# Patient Record
Sex: FEMALE | Race: WHITE | NOT HISPANIC OR LATINO | Employment: UNEMPLOYED | URBAN - METROPOLITAN AREA
[De-identification: names, ages, dates, MRNs, and addresses within clinical notes are randomized per-mention and may not be internally consistent; named-entity substitution may affect disease eponyms.]

---

## 2017-06-10 ENCOUNTER — HOSPITAL ENCOUNTER (OUTPATIENT)
Dept: RESPIRATORY THERAPY | Facility: HOSPITAL | Age: 57
Discharge: HOME | End: 2017-06-10
Attending: NURSE PRACTITIONER | Admitting: NURSE PRACTITIONER

## 2017-08-15 ENCOUNTER — HOSPITAL ENCOUNTER (OUTPATIENT)
Dept: SLEEP MEDICINE | Facility: HOSPITAL | Age: 57
Discharge: HOME | End: 2017-08-15
Attending: INTERNAL MEDICINE | Admitting: INTERNAL MEDICINE

## 2017-08-15 ENCOUNTER — HOSPITAL ENCOUNTER (OUTPATIENT)
Dept: MAMMOGRAPHY | Facility: HOSPITAL | Age: 57
Discharge: HOME | End: 2017-08-15

## 2017-08-16 ENCOUNTER — HOSPITAL ENCOUNTER (OUTPATIENT)
Dept: PREOP | Facility: HOSPITAL | Age: 57
Setting detail: HOSPITAL OUTPATIENT SURGERY
Discharge: HOME | End: 2017-08-16
Attending: INTERNAL MEDICINE | Admitting: INTERNAL MEDICINE

## 2017-08-16 LAB
AFB STAIN: NORMAL
B PERT DNA SPEC QL NAA+PROBE: NOT DETECTED
BACTERIA SPEC AEROBE CULT: NORMAL
C PNEUM DNA NPH QL NAA+NON-PROBE: NOT DETECTED
CONCENTRATED SMEAR: (no result)
CONV DIRECT SMEAR: NORMAL
CONV GRANULOCYTES, FLUID: 42 %
CONV MONOCYTES, FLUID: 6 %
CONV RESPNL SPECIMEN: NORMAL
FLUAV H1 2009 PAND RNA NPH QL NAA+PROBE: NOT DETECTED
FLUAV H1 HA GENE NPH QL NAA+PROBE: NOT DETECTED
FLUAV H3 RNA NPH QL NAA+PROBE: NOT DETECTED
FLUAV SUBTYP SPEC NAA+PROBE: NOT DETECTED
FLUBV RNA ISLT QL NAA+PROBE: NOT DETECTED
GRAM STN SPEC: NORMAL
HADV DNA SPEC NAA+PROBE: NOT DETECTED
HCOV 229E RNA SPEC QL NAA+PROBE: NOT DETECTED
HCOV HKU1 RNA SPEC QL NAA+PROBE: NOT DETECTED
HCOV NL63 RNA SPEC QL NAA+PROBE: NOT DETECTED
HCOV OC43 RNA SPEC QL NAA+PROBE: NOT DETECTED
HMPV RNA NPH QL NAA+NON-PROBE: NOT DETECTED
HPIV1 RNA ISLT QL NAA+PROBE: NOT DETECTED
HPIV2 RNA SPEC QL NAA+PROBE: NOT DETECTED
HPIV3 RNA NPH QL NAA+PROBE: NOT DETECTED
HPIV4 P GENE NPH QL NAA+PROBE: NOT DETECTED
Lab: NORMAL
M PNEUMO IGG SER IA-ACNC: NOT DETECTED
MESOTHL CELL NFR FLD MANUAL: 52 %
MICRO REPORT STATUS: NORMAL
RHINOVIRUS RNA SPEC NAA+PROBE: NOT DETECTED
RSV RNA NPH QL NAA+NON-PROBE: NOT DETECTED
SPECIMEN SOURCE: (no result)
SPECIMEN SOURCE: NORMAL
WBC # FLD MANUAL: (no result) {CELLS}/UL

## 2017-08-18 LAB
SPECIMEN SOURCE: NORMAL
SPECIMEN SOURCE: NORMAL

## 2017-08-23 ENCOUNTER — HOSPITAL ENCOUNTER (OUTPATIENT)
Dept: CT IMAGING | Facility: HOSPITAL | Age: 57
Discharge: HOME | End: 2017-08-23
Attending: INTERNAL MEDICINE | Admitting: INTERNAL MEDICINE

## 2017-09-12 ENCOUNTER — HOSPITAL ENCOUNTER (OUTPATIENT)
Dept: SLEEP MEDICINE | Facility: HOSPITAL | Age: 57
Discharge: HOME | End: 2017-09-12
Attending: INTERNAL MEDICINE | Admitting: INTERNAL MEDICINE

## 2017-09-29 ENCOUNTER — HOSPITAL ENCOUNTER (OUTPATIENT)
Dept: FAMILY MEDICINE CLINIC | Facility: CLINIC | Age: 57
Setting detail: SPECIMEN
Discharge: HOME | End: 2017-09-29
Attending: HOSPITALIST | Admitting: HOSPITALIST

## 2018-02-14 ENCOUNTER — HOSPITAL ENCOUNTER (OUTPATIENT)
Dept: ORTHOPEDIC SURGERY | Facility: CLINIC | Age: 58
Discharge: HOME | End: 2018-02-14
Attending: ORTHOPAEDIC SURGERY | Admitting: ORTHOPAEDIC SURGERY

## 2018-03-02 ENCOUNTER — HOSPITAL ENCOUNTER (OUTPATIENT)
Dept: PREOP | Facility: HOSPITAL | Age: 58
Setting detail: HOSPITAL OUTPATIENT SURGERY
Discharge: HOME | End: 2018-03-02
Attending: INTERNAL MEDICINE | Admitting: INTERNAL MEDICINE

## 2018-04-09 ENCOUNTER — HOSPITAL ENCOUNTER (OUTPATIENT)
Dept: RHEUMATOLOGY | Facility: CLINIC | Age: 58
Discharge: HOME | End: 2018-04-09
Attending: NURSE PRACTITIONER | Admitting: NURSE PRACTITIONER

## 2018-05-21 ENCOUNTER — OFFICE (AMBULATORY)
Dept: URBAN - METROPOLITAN AREA PATHOLOGY 4 | Facility: PATHOLOGY | Age: 58
End: 2018-05-21
Payer: COMMERCIAL

## 2018-05-21 ENCOUNTER — ON CAMPUS - OUTPATIENT (AMBULATORY)
Dept: URBAN - METROPOLITAN AREA HOSPITAL 2 | Facility: HOSPITAL | Age: 58
End: 2018-05-21
Payer: COMMERCIAL

## 2018-05-21 VITALS
SYSTOLIC BLOOD PRESSURE: 177 MMHG | SYSTOLIC BLOOD PRESSURE: 139 MMHG | DIASTOLIC BLOOD PRESSURE: 68 MMHG | OXYGEN SATURATION: 96 % | OXYGEN SATURATION: 97 % | HEIGHT: 62 IN | RESPIRATION RATE: 16 BRPM | DIASTOLIC BLOOD PRESSURE: 61 MMHG | HEART RATE: 93 BPM | DIASTOLIC BLOOD PRESSURE: 74 MMHG | DIASTOLIC BLOOD PRESSURE: 66 MMHG | HEART RATE: 94 BPM | HEART RATE: 84 BPM | WEIGHT: 214 LBS | HEART RATE: 95 BPM | SYSTOLIC BLOOD PRESSURE: 138 MMHG | DIASTOLIC BLOOD PRESSURE: 77 MMHG | OXYGEN SATURATION: 98 % | RESPIRATION RATE: 18 BRPM | TEMPERATURE: 98 F | SYSTOLIC BLOOD PRESSURE: 141 MMHG | SYSTOLIC BLOOD PRESSURE: 132 MMHG | OXYGEN SATURATION: 93 %

## 2018-05-21 DIAGNOSIS — K31.7 POLYP OF STOMACH AND DUODENUM: ICD-10-CM

## 2018-05-21 DIAGNOSIS — K21.0 GASTRO-ESOPHAGEAL REFLUX DISEASE WITH ESOPHAGITIS: ICD-10-CM

## 2018-05-21 DIAGNOSIS — K21.9 GASTRO-ESOPHAGEAL REFLUX DISEASE WITHOUT ESOPHAGITIS: ICD-10-CM

## 2018-05-21 DIAGNOSIS — R13.10 DYSPHAGIA, UNSPECIFIED: ICD-10-CM

## 2018-05-21 DIAGNOSIS — K44.9 DIAPHRAGMATIC HERNIA WITHOUT OBSTRUCTION OR GANGRENE: ICD-10-CM

## 2018-05-21 DIAGNOSIS — R05 COUGH: ICD-10-CM

## 2018-05-21 LAB
GI HISTOLOGY: A. UNSPECIFIED: (no result)
GI HISTOLOGY: PDF REPORT: (no result)

## 2018-05-21 PROCEDURE — 88305 TISSUE EXAM BY PATHOLOGIST: CPT | Performed by: INTERNAL MEDICINE

## 2018-05-21 PROCEDURE — 43239 EGD BIOPSY SINGLE/MULTIPLE: CPT | Performed by: INTERNAL MEDICINE

## 2018-05-21 PROCEDURE — 43450 DILATE ESOPHAGUS 1/MULT PASS: CPT | Performed by: INTERNAL MEDICINE

## 2018-05-21 RX ORDER — ESOMEPRAZOLE 20 MG/1
20 CAPSULE, DELAYED RELEASE ORAL
Status: COMPLETED
End: 2018-05-21

## 2018-05-21 RX ORDER — MELOXICAM 15 MG/1
TABLET ORAL
Refills: 0 | Status: COMPLETED
End: 2018-05-21

## 2018-05-21 RX ORDER — PANTOPRAZOLE 40 MG/1
TABLET, DELAYED RELEASE ORAL
Qty: 180 | Refills: 3 | Status: ACTIVE

## 2018-05-21 RX ADMIN — PROPOFOL: 10 INJECTION, EMULSION INTRAVENOUS at 12:51

## 2018-06-11 ENCOUNTER — HOSPITAL ENCOUNTER (OUTPATIENT)
Dept: ORTHOPEDIC SURGERY | Facility: CLINIC | Age: 58
Discharge: HOME | End: 2018-06-11
Attending: ORTHOPAEDIC SURGERY | Admitting: ORTHOPAEDIC SURGERY

## 2018-08-03 ENCOUNTER — HOSPITAL ENCOUNTER (OUTPATIENT)
Dept: PREOP | Facility: HOSPITAL | Age: 58
Setting detail: HOSPITAL OUTPATIENT SURGERY
Discharge: HOME | End: 2018-08-03
Attending: INTERNAL MEDICINE | Admitting: INTERNAL MEDICINE

## 2018-08-03 LAB
AFB STAIN: NORMAL
B PERT DNA SPEC QL NAA+PROBE: NOT DETECTED
BACTERIA SPEC AEROBE CULT: NORMAL
C PNEUM DNA NPH QL NAA+NON-PROBE: NOT DETECTED
CONCENTRATED SMEAR: NORMAL
CONV DIRECT SMEAR: NORMAL
CONV GRANULOCYTES, FLUID: 58 %
CONV MONOCYTES, FLUID: 10 %
CONV RESPNL SPECIMEN: NORMAL
EOSINOPHIL NFR FLD MANUAL: 9 %
FLUAV H1 2009 PAND RNA NPH QL NAA+PROBE: NOT DETECTED
FLUAV H1 HA GENE NPH QL NAA+PROBE: NOT DETECTED
FLUAV H3 RNA NPH QL NAA+PROBE: NOT DETECTED
FLUAV SUBTYP SPEC NAA+PROBE: NOT DETECTED
FLUBV RNA ISLT QL NAA+PROBE: NOT DETECTED
GRAM STN SPEC: NORMAL
HADV DNA SPEC NAA+PROBE: NOT DETECTED
HCOV 229E RNA SPEC QL NAA+PROBE: NOT DETECTED
HCOV HKU1 RNA SPEC QL NAA+PROBE: NOT DETECTED
HCOV NL63 RNA SPEC QL NAA+PROBE: NOT DETECTED
HCOV OC43 RNA SPEC QL NAA+PROBE: NOT DETECTED
HMPV RNA NPH QL NAA+NON-PROBE: NOT DETECTED
HPIV1 RNA ISLT QL NAA+PROBE: NOT DETECTED
HPIV2 RNA SPEC QL NAA+PROBE: NOT DETECTED
HPIV3 RNA NPH QL NAA+PROBE: NOT DETECTED
HPIV4 P GENE NPH QL NAA+PROBE: NOT DETECTED
LYMPHOCYTES NFR FLD MANUAL: 12 %
Lab: NORMAL
M PNEUMO IGG SER IA-ACNC: NOT DETECTED
MESOTHL CELL NFR FLD MANUAL: 11 %
MICRO REPORT STATUS: NORMAL
RHINOVIRUS RNA SPEC NAA+PROBE: NOT DETECTED
RSV RNA NPH QL NAA+NON-PROBE: NOT DETECTED
SPECIMEN SOURCE: NORMAL

## 2018-08-06 LAB — SPECIMEN SOURCE: NORMAL

## 2018-09-19 ENCOUNTER — HOSPITAL ENCOUNTER (OUTPATIENT)
Dept: RHEUMATOLOGY | Facility: CLINIC | Age: 58
Discharge: HOME | End: 2018-09-19
Attending: INTERNAL MEDICINE | Admitting: INTERNAL MEDICINE

## 2019-04-09 ENCOUNTER — HOSPITAL ENCOUNTER (OUTPATIENT)
Dept: LAB | Facility: HOSPITAL | Age: 59
Discharge: HOME | End: 2019-04-09
Attending: NURSE PRACTITIONER | Admitting: NURSE PRACTITIONER

## 2019-04-09 LAB
ALBUMIN SERPL-MCNC: 3.7 G/DL (ref 3.5–4.8)
ALBUMIN/GLOB SERPL: 1.1 {RATIO} (ref 1–1.7)
ALP SERPL-CCNC: 83 IU/L (ref 32–91)
ALT SERPL-CCNC: 11 IU/L (ref 14–54)
ANION GAP SERPL CALC-SCNC: 14.1 MMOL/L (ref 10–20)
AST SERPL-CCNC: 19 IU/L (ref 15–41)
BASOPHILS # BLD AUTO: 0.1 10*3/UL (ref 0–0.2)
BASOPHILS NFR BLD AUTO: 1 % (ref 0–2)
BILIRUB SERPL-MCNC: 0.6 MG/DL (ref 0.3–1.2)
BILIRUB UR QL STRIP: NEGATIVE MG/DL
BUN SERPL-MCNC: 12 MG/DL (ref 8–20)
BUN/CREAT SERPL: 17.1 (ref 5.4–26.2)
CALCIUM SERPL-MCNC: 9.2 MG/DL (ref 8.9–10.3)
CASTS URNS QL MICRO: NORMAL /[LPF]
CHLORIDE SERPL-SCNC: 103 MMOL/L (ref 101–111)
COLOR UR: YELLOW
CONV BACTERIA IN URINE MICRO: NEGATIVE
CONV CLARITY OF URINE: CLEAR
CONV CO2: 26 MMOL/L (ref 22–32)
CONV HYALINE CASTS IN URINE MICRO: 2 /[LPF] (ref 0–5)
CONV PROTEIN IN URINE BY AUTOMATED TEST STRIP: NEGATIVE MG/DL
CONV SMALL ROUND CELLS: NORMAL /[HPF]
CONV TOTAL PROTEIN: 7.2 G/DL (ref 6.1–7.9)
CONV UROBILINOGEN IN URINE BY AUTOMATED TEST STRIP: 0.2 MG/DL
CREAT UR-MCNC: 0.7 MG/DL (ref 0.4–1)
CRP SERPL-MCNC: 0.67 MG/DL (ref 0–0.7)
CULTURE INDICATED?: NORMAL
DIFFERENTIAL METHOD BLD: (no result)
EOSINOPHIL # BLD AUTO: 0.3 10*3/UL (ref 0–0.3)
EOSINOPHIL # BLD AUTO: 5 % (ref 0–3)
ERYTHROCYTE [DISTWIDTH] IN BLOOD BY AUTOMATED COUNT: 14.9 % (ref 11.5–14.5)
ERYTHROCYTE [SEDIMENTATION RATE] IN BLOOD BY WESTERGREN METHOD: 59 MM/HR (ref 0–30)
GLOBULIN UR ELPH-MCNC: 3.5 G/DL (ref 2.5–3.8)
GLUCOSE SERPL-MCNC: 94 MG/DL (ref 65–99)
GLUCOSE UR QL: NEGATIVE MG/DL
HCT VFR BLD AUTO: 37.1 % (ref 35–49)
HGB BLD-MCNC: 12.1 G/DL (ref 12–15)
HGB UR QL STRIP: NEGATIVE
KETONES UR QL STRIP: NEGATIVE MG/DL
LEUKOCYTE ESTERASE UR QL STRIP: NEGATIVE
LYMPHOCYTES # BLD AUTO: 1.6 10*3/UL (ref 0.8–4.8)
LYMPHOCYTES NFR BLD AUTO: 25 % (ref 18–42)
MCH RBC QN AUTO: 29.6 PG (ref 26–32)
MCHC RBC AUTO-ENTMCNC: 32.6 G/DL (ref 32–36)
MCV RBC AUTO: 90.7 FL (ref 80–94)
MONOCYTES # BLD AUTO: 0.8 10*3/UL (ref 0.1–1.3)
MONOCYTES NFR BLD AUTO: 13 % (ref 2–11)
NEUTROPHILS # BLD AUTO: 3.6 10*3/UL (ref 2.3–8.6)
NEUTROPHILS NFR BLD AUTO: 56 % (ref 50–75)
NITRITE UR QL STRIP: NEGATIVE
NRBC BLD AUTO-RTO: 0 /100{WBCS}
NRBC/RBC NFR BLD MANUAL: 0 10*3/UL
PH UR STRIP.AUTO: 6.5 [PH] (ref 4.5–8)
PLATELET # BLD AUTO: 239 10*3/UL (ref 150–450)
PMV BLD AUTO: 9.9 FL (ref 7.4–10.4)
POTASSIUM SERPL-SCNC: 4.1 MMOL/L (ref 3.6–5.1)
RBC # BLD AUTO: 4.09 10*6/UL (ref 4–5.4)
RBC #/AREA URNS HPF: 1 /[HPF] (ref 0–3)
SODIUM SERPL-SCNC: 139 MMOL/L (ref 136–144)
SP GR UR: 1.02 (ref 1–1.03)
SPERM URNS QL MICRO: NORMAL /[HPF]
SQUAMOUS SPT QL MICRO: 0 /[HPF] (ref 0–5)
UNIDENT CRYS URNS QL MICRO: NORMAL /[HPF]
WBC # BLD AUTO: 6.3 10*3/UL (ref 4.5–11.5)
WBC #/AREA URNS HPF: 0 /[HPF] (ref 0–5)
YEAST SPEC QL WET PREP: NORMAL /[HPF]

## 2019-04-10 LAB
C3 SERPL-MCNC: 99 MG/DL (ref 79–152)
C4 SERPL-MCNC: 25.3 MG/DL (ref 18–55)

## 2019-07-05 PROBLEM — Z82.3 FAMILY HISTORY OF CEREBROVASCULAR ACCIDENT (CVA): Status: ACTIVE | Noted: 2019-07-05

## 2019-07-05 PROBLEM — M19.011 OSTEOARTHRITIS OF RIGHT SHOULDER: Status: ACTIVE | Noted: 2017-03-03

## 2019-07-05 PROBLEM — M79.643 PAIN OF HAND: Status: ACTIVE | Noted: 2018-09-19

## 2019-07-05 PROBLEM — Z47.89 ORTHOPEDIC AFTERCARE: Status: ACTIVE | Noted: 2018-06-11

## 2019-07-05 PROBLEM — M54.16 LUMBAR RADICULITIS: Status: ACTIVE | Noted: 2017-05-10

## 2019-07-05 PROBLEM — J45.901 ASTHMA WITH ACUTE EXACERBATION: Status: ACTIVE | Noted: 2017-06-13

## 2019-07-05 PROBLEM — Z87.01 HISTORY OF PNEUMONIA: Status: ACTIVE | Noted: 2018-04-09

## 2019-07-05 PROBLEM — R11.0 NAUSEA: Status: ACTIVE | Noted: 2018-01-10

## 2019-07-05 PROBLEM — Z23 NEED FOR OTHER PROPHYLACTIC VACCINATION AND INOCULATION AGAINST SINGLE DISEASES: Status: ACTIVE | Noted: 2017-09-29

## 2019-07-05 PROBLEM — E66.9 OBESITY: Status: ACTIVE | Noted: 2019-07-05

## 2019-07-05 PROBLEM — M81.0 OSTEOPOROSIS: Status: ACTIVE | Noted: 2017-07-13

## 2019-07-05 PROBLEM — F41.1 GENERALIZED ANXIETY DISORDER: Status: ACTIVE | Noted: 2019-07-05

## 2019-07-05 PROBLEM — I10 HYPERTENSION: Status: ACTIVE | Noted: 2019-07-05

## 2019-07-05 PROBLEM — M25.511 PAIN IN RIGHT SHOULDER: Status: ACTIVE | Noted: 2017-02-13

## 2019-07-05 PROBLEM — E78.5 HYPERLIPIDEMIA: Status: ACTIVE | Noted: 2019-07-05

## 2019-07-05 PROBLEM — M25.569 KNEE PAIN: Status: ACTIVE | Noted: 2017-02-13

## 2019-07-05 PROBLEM — M06.9 RHEUMATOID ARTHRITIS (HCC): Status: ACTIVE | Noted: 2019-07-05

## 2019-07-05 RX ORDER — FLUTICASONE PROPIONATE 50 MCG
2 SPRAY, SUSPENSION (ML) NASAL DAILY
COMMUNITY
Start: 2019-04-18

## 2019-07-05 RX ORDER — MONTELUKAST SODIUM 10 MG/1
TABLET ORAL
COMMUNITY
Start: 2018-12-11 | End: 2021-09-11 | Stop reason: SDUPTHER

## 2019-07-05 RX ORDER — TRAMADOL HYDROCHLORIDE 50 MG/1
TABLET ORAL EVERY 6 HOURS
COMMUNITY
Start: 2018-02-14 | End: 2020-05-04

## 2019-07-05 RX ORDER — BUDESONIDE AND FORMOTEROL FUMARATE DIHYDRATE 160; 4.5 UG/1; UG/1
AEROSOL RESPIRATORY (INHALATION)
COMMUNITY
Start: 2018-09-17 | End: 2019-09-15 | Stop reason: SDUPTHER

## 2019-07-05 RX ORDER — PANTOPRAZOLE SODIUM 40 MG/1
40 TABLET, DELAYED RELEASE ORAL DAILY
COMMUNITY
Start: 2018-05-21

## 2019-07-05 RX ORDER — HYDROXYCHLOROQUINE SULFATE 200 MG/1
200 TABLET, FILM COATED ORAL EVERY 12 HOURS
Qty: 180 TABLET | Refills: 0 | Status: SHIPPED | OUTPATIENT
Start: 2019-07-05 | End: 2019-10-07 | Stop reason: SDUPTHER

## 2019-07-05 RX ORDER — PREDNISONE 1 MG/1
TABLET ORAL EVERY 24 HOURS
COMMUNITY
Start: 2018-09-19 | End: 2019-08-01 | Stop reason: SDUPTHER

## 2019-07-05 RX ORDER — CETIRIZINE HYDROCHLORIDE 10 MG/1
TABLET ORAL
Status: ON HOLD | COMMUNITY
Start: 2019-04-18 | End: 2022-01-25

## 2019-07-05 RX ORDER — ALBUTEROL SULFATE 2.5 MG/3ML
2.5 SOLUTION RESPIRATORY (INHALATION) EVERY 6 HOURS PRN
COMMUNITY
Start: 2017-07-18

## 2019-07-05 RX ORDER — THEOPHYLLINE 300 MG/1
400 TABLET, EXTENDED RELEASE ORAL EVERY 24 HOURS
Status: ON HOLD | COMMUNITY
Start: 2018-04-17 | End: 2022-01-24

## 2019-07-05 RX ORDER — VITAMIN B COMPLEX
1 CAPSULE ORAL DAILY
COMMUNITY
Start: 2017-04-20

## 2019-07-05 RX ORDER — ALBUTEROL SULFATE 90 UG/1
AEROSOL, METERED RESPIRATORY (INHALATION)
COMMUNITY
Start: 2019-04-18 | End: 2021-05-21 | Stop reason: SDUPTHER

## 2019-07-05 RX ORDER — FOLIC ACID 1 MG/1
TABLET ORAL
COMMUNITY
Start: 2016-08-03 | End: 2020-01-08 | Stop reason: SDUPTHER

## 2019-07-05 RX ORDER — HYDROXYCHLOROQUINE SULFATE 200 MG/1
200 TABLET, FILM COATED ORAL EVERY 12 HOURS
COMMUNITY
Start: 2017-07-13 | End: 2019-07-05 | Stop reason: SDUPTHER

## 2019-07-05 RX ORDER — CITALOPRAM 40 MG/1
TABLET ORAL EVERY 24 HOURS
COMMUNITY
Start: 2018-01-15 | End: 2019-09-04 | Stop reason: SDUPTHER

## 2019-07-05 RX ORDER — LOSARTAN POTASSIUM 100 MG/1
TABLET ORAL EVERY 24 HOURS
COMMUNITY
Start: 2018-07-31 | End: 2019-09-04 | Stop reason: SDUPTHER

## 2019-07-09 ENCOUNTER — TELEPHONE (OUTPATIENT)
Dept: FAMILY MEDICINE CLINIC | Facility: CLINIC | Age: 59
End: 2019-07-09

## 2019-07-09 NOTE — TELEPHONE ENCOUNTER
Patient left vm stating that she uses spacer with symbicort inhaler. Patient states that spacer needs to be replaced. Patient is asking if she needs a prescription to replace spacer.

## 2019-07-29 ENCOUNTER — TELEPHONE (OUTPATIENT)
Dept: RHEUMATOLOGY | Facility: CLINIC | Age: 59
End: 2019-07-29

## 2019-08-01 RX ORDER — PREDNISONE 2.5 MG
2.5 TABLET ORAL DAILY
Qty: 30 TABLET | Refills: 1 | Status: SHIPPED | OUTPATIENT
Start: 2019-08-01 | End: 2020-01-21 | Stop reason: SDUPTHER

## 2019-08-09 ENCOUNTER — CONVERSION ENCOUNTER (OUTPATIENT)
Dept: RHEUMATOLOGY | Facility: CLINIC | Age: 59
End: 2019-08-09

## 2019-08-09 LAB
ALBUMIN SERPL-MCNC: 4 G/DL (ref 3.6–5.1)
ALBUMIN/GLOB SERPL: ABNORMAL {RATIO} (ref 1–2.5)
ALP SERPL-CCNC: 78 UNITS/L (ref 33–130)
ALT SERPL-CCNC: 9 UNITS/L (ref 6–29)
AST SERPL-CCNC: 14 UNITS/L (ref 10–35)
BASOPHILS # BLD AUTO: ABNORMAL 10*3/MM3 (ref 0–200)
BASOPHILS NFR BLD AUTO: 0.8 %
BILIRUB SERPL-MCNC: 0.4 MG/DL (ref 0.2–1.2)
BILIRUB UR QL STRIP: NEGATIVE
BUN SERPL-MCNC: 18 MG/DL (ref 7–25)
BUN/CREAT SERPL: ABNORMAL (ref 6–22)
C3 SERPL-MCNC: 130 MG/DL (ref 83–193)
C4 SERPL-MCNC: 32 MG/DL (ref 15–57)
CALCIUM SERPL-MCNC: 9.3 MG/DL (ref 8.6–10.4)
CHLORIDE SERPL-SCNC: 102 MMOL/L (ref 98–110)
CO2 CONTENT VENOUS: 30 MMOL/L (ref 20–32)
COLOR UR: YELLOW
CONV BACTERIA IN URINE MICRO: ABNORMAL /HPF
CONV CALCIUM OXALATE CRYSTALS /HPF IN URINE SEDIMENT BY MICROSCOPY: ABNORMAL
CONV HYALINE CASTS IN URINE MICRO: ABNORMAL
CONV NEUTROPHILS/100 LEUKOCYTES IN BODY FLUID BY MANUAL COUNT: 64.4 %
CONV PROTEIN IN URINE BY AUTOMATED TEST STRIP: NEGATIVE
CONV TOTAL PROTEIN: 7.1 G/DL (ref 6.1–8.1)
CREAT UR-MCNC: 1.1 MG/DL (ref 0.5–1.05)
CRP SERPL-MCNC: 0.36 MG/DL
EOSINOPHIL # BLD AUTO: 4.5 %
EOSINOPHIL # BLD AUTO: ABNORMAL 10*3/MM3 (ref 15–500)
ERYTHROCYTE [DISTWIDTH] IN BLOOD BY AUTOMATED COUNT: 13.6 % (ref 11–15)
ERYTHROCYTE [SEDIMENTATION RATE] IN BLOOD BY WESTERGREN METHOD: 36 MM/HR
GLOBULIN UR ELPH-MCNC: ABNORMAL G/DL (ref 1.9–3.7)
GLUCOSE SERPL-MCNC: 89 MG/DL (ref 65–139)
GLUCOSE UR QL: NEGATIVE G/DL
HCT VFR BLD AUTO: 37.7 % (ref 35–45)
HGB BLD-MCNC: 12.5 G/DL (ref 11.7–15.5)
HGB UR QL STRIP: NEGATIVE
KETONES UR QL STRIP: NEGATIVE
LYMPHOCYTES # BLD AUTO: ABNORMAL 10*3/MM3 (ref 850–3900)
LYMPHOCYTES NFR BLD AUTO: 20.9 %
MCH RBC QN AUTO: 29.6 PG (ref 27–33)
MCHC RBC AUTO-ENTMCNC: ABNORMAL % (ref 32–36)
MCV RBC AUTO: 89.1 FL (ref 80–100)
MONOCYTES # BLD AUTO: ABNORMAL 10*3/MICROLITER (ref 200–950)
MONOCYTES NFR BLD AUTO: 9.4 %
NEUTROPHILS # BLD AUTO: ABNORMAL 10*3/MM3 (ref 1500–7800)
PH UR STRIP.AUTO: 6 [PH] (ref 5–8)
PLATELET # BLD AUTO: ABNORMAL 10*3/MM3 (ref 140–400)
PMV BLD AUTO: 11.6 FL (ref 7.5–12.5)
POTASSIUM SERPL-SCNC: 4.4 MMOL/L (ref 3.5–5.3)
RBC # BLD AUTO: ABNORMAL 10*6/MM3 (ref 3.8–5.1)
RBC #/AREA URNS HPF: ABNORMAL /[HPF]
SODIUM SERPL-SCNC: 141 MMOL/L (ref 135–146)
SP GR UR: 1.03 (ref 1–1.03)
SQUAMOUS #/AREA URNS HPF: ABNORMAL /HPF
WBC # BLD AUTO: ABNORMAL K/UL (ref 3.8–10.8)
WBC #/AREA URNS HPF: ABNORMAL CELLS/HPF

## 2019-08-19 ENCOUNTER — OFFICE VISIT (OUTPATIENT)
Dept: RHEUMATOLOGY | Facility: CLINIC | Age: 59
End: 2019-08-19

## 2019-08-19 VITALS
SYSTOLIC BLOOD PRESSURE: 112 MMHG | HEIGHT: 62 IN | BODY MASS INDEX: 41.41 KG/M2 | HEART RATE: 87 BPM | WEIGHT: 225 LBS | DIASTOLIC BLOOD PRESSURE: 76 MMHG

## 2019-08-19 DIAGNOSIS — E55.9 VITAMIN D DEFICIENCY: ICD-10-CM

## 2019-08-19 DIAGNOSIS — M05.79 RHEUMATOID ARTHRITIS INVOLVING MULTIPLE SITES WITH POSITIVE RHEUMATOID FACTOR (HCC): Primary | ICD-10-CM

## 2019-08-19 DIAGNOSIS — J84.9 INTERSTITIAL LUNG DISEASE (HCC): ICD-10-CM

## 2019-08-19 DIAGNOSIS — M17.11 OSTEOARTHRITIS OF RIGHT KNEE, UNSPECIFIED OSTEOARTHRITIS TYPE: ICD-10-CM

## 2019-08-19 PROCEDURE — 20610 DRAIN/INJ JOINT/BURSA W/O US: CPT | Performed by: NURSE PRACTITIONER

## 2019-08-19 PROCEDURE — 99214 OFFICE O/P EST MOD 30 MIN: CPT | Performed by: NURSE PRACTITIONER

## 2019-08-19 RX ORDER — INHALER, ASSIST DEVICES
SPACER (EA) MISCELLANEOUS SEE ADMIN INSTRUCTIONS
Refills: 0 | Status: ON HOLD | COMMUNITY
Start: 2019-07-11 | End: 2022-01-25

## 2019-08-19 RX ORDER — METHYLPREDNISOLONE ACETATE 80 MG/ML
80 INJECTION, SUSPENSION INTRA-ARTICULAR; INTRALESIONAL; INTRAMUSCULAR; SOFT TISSUE ONCE
Status: COMPLETED | OUTPATIENT
Start: 2019-08-19 | End: 2019-08-19

## 2019-08-19 RX ADMIN — METHYLPREDNISOLONE ACETATE 80 MG: 80 INJECTION, SUSPENSION INTRA-ARTICULAR; INTRALESIONAL; INTRAMUSCULAR; SOFT TISSUE at 13:14

## 2019-08-19 NOTE — PATIENT INSTRUCTIONS
Injection: right knee--> see note.   Xray given of the right knee  Future labs  Continue plaquenil  RTO 4-5 month or sooner if needed

## 2019-08-19 NOTE — PROGRESS NOTES
"Subjective   Marieltyson Parker is a 59 y.o. female.     History of Present Illness     Pt is a pleasant 59 y.o. female with history of +LYNNE, rheumatoid arthritis, COPD w/ ILD, osteopenia  Last seen in office in April 2019.     Currently taking plaquenil 200mg 2 tabs/d and she is up to date on her eye examination. She takes tylenol as needed and if needed will take a prednisone 2.5 mg tablet for arthritis flares only, reports not having to take this very often. Will have a tramadol as needed for pain.     Having bilateral knee pain, but mostly to the right knee. Denies injury. Denies red or hot joint. Pain exacerbates w/ climbing stairs.   Denies any significant joint swelling. Takes calcium + vitamin D daily. Denies falls or fractures.  Recent labs on 8-9-19: mildly low vitamin D at 25, normal C3/C4, ESR slightly elevated at 36, normal CRP, CBC shows no leukopenia, anemia or thrombocytopenia, urinalysis was (-) for blood or protein, creatinine was mildly elevated at 1.10 (0.5-1.05)    Review of systems: denies fever. chills at times. Denies dry eyes or mouth. She has a sore on her tongue, takes folic acid for this. Denies N/V.D. Denies CP. She does have SOA, has COPD + ILD. Follows Dr. Garcia and she takes inhalers, theophylline, symbicort & nebs if needed. Plans to have a repeat chest CT in the next 6 mos.  She also is on allergy medications. No skin rashes or PS. Energy level is \"up and down.\"  Denies hairloss, no bloody/foamy urine.   The remainder of the review of systems reviewed and are (-).  Hand xray in September 2018 showed mild osteoarthritis.      The following portions of the patient's history were reviewed and updated as appropriate: allergies, current medications, past family history, past medical history, past social history, past surgical history and problem list.    Review of Systems   Constitutional:        See HPI       Objective   Physical Exam   Constitutional: She is oriented to person, place, " and time. She appears well-developed and well-nourished.   No assistive devices   HENT:   Head: Normocephalic and atraumatic.   Nose: Nose normal.   Eyes: EOM are normal. Pupils are equal, round, and reactive to light.   Cardiovascular: Normal rate and regular rhythm.   Pulmonary/Chest: Effort normal.   Fine inspiratory crackles are noted anteriorly & posteriorly   Musculoskeletal:   She has decreased ROM of the bilateral knees, right worse than left. +crepitus is noted to the right knee  She has mild swelling over the bilateral knees  Tenderness is noted over both knees, right > left. She is tenderness over the medial aspect of the right knee.   Neurological: She is alert and oriented to person, place, and time.   Skin: Skin is warm and dry. Capillary refill takes less than 2 seconds.   Psychiatric: She has a normal mood and affect. Her behavior is normal.         After risks and benefits explained to the patient, consent obtained. The right knee was cleaned and prepped in sterile fashion with betadine and alcohol. The area was numbed with topical lidocaine spray and then injected with 80 mg of Depo Medrol and 1 cc of lidocaine.  Pt tolerated well. Band aid applied.        Assessment/Plan   Mariel was seen today for joint pain.    Diagnoses and all orders for this visit:    Rheumatoid arthritis involving multiple sites with positive rheumatoid factor (CMS/[facility])  Comments:  Stable--> labs reviewed & discussed  Continue plaquenil  Future lab orders given.  Orders:  -     Cancel: CBC With Manual Differential; Future  -     Cancel: Comprehensive Metabolic Panel; Future  -     Cancel: C-reactive Protein; Future  -     Cancel: C3 Complement; Future  -     Cancel: C4 Complement; Future  -     Cancel: Urinalysis With Culture If Indicated -; Future  -     Cancel: C3 Complement; Future  -     Cancel: C4 Complement; Future  -     Cancel: CBC With Manual Differential; Future  -     C3 Complement; Future  -     C4 Complement;  Future  -     CBC With Manual Differential; Future  -     Comprehensive Metabolic Panel; Future  -     C-reactive Protein; Future  -     Urinalysis With Culture If Indicated -; Future    Osteoarthritis of right knee, unspecified osteoarthritis type  Comments:  Injection given today.   Xray  Orders:  -     Cancel: XR Knee 3 View Right; Future  -     XR Knee 3 View Right; Future  -     methylPREDNISolone acetate (DEPO-medrol) injection 80 mg    Interstitial lung disease (CMS/HCC)  Comments:  She is under the care of pulmonology    Vitamin D deficiency  Comments:  Supplement with 2,000 IU  once daily of vitamin D.        Patient Instructions   Injection: right knee--> see note.   Xray given of the right knee  Future labs  Continue plaquenil  RTO 4-5 month or sooner if needed

## 2019-09-04 ENCOUNTER — TELEPHONE (OUTPATIENT)
Dept: FAMILY MEDICINE CLINIC | Facility: CLINIC | Age: 59
End: 2019-09-04

## 2019-09-04 RX ORDER — LOSARTAN POTASSIUM 100 MG/1
100 TABLET ORAL EVERY 24 HOURS
Qty: 90 TABLET | Refills: 2 | Status: SHIPPED | OUTPATIENT
Start: 2019-09-04 | End: 2020-06-05 | Stop reason: SDUPTHER

## 2019-09-04 RX ORDER — CITALOPRAM 40 MG/1
40 TABLET ORAL EVERY MORNING
Qty: 90 TABLET | Refills: 2 | Status: SHIPPED | OUTPATIENT
Start: 2019-09-04 | End: 2020-06-05 | Stop reason: SDUPTHER

## 2019-09-04 NOTE — TELEPHONE ENCOUNTER
VM MESSAGE.  PATIENT IS REQUESTING 90-DAY SUPPLY OF FOLLOWING MEDS TO BE SENT IN:    LOSARTAN 100MG  CITALOPRAM 40MG    THANK YOU.

## 2019-09-06 ENCOUNTER — TELEPHONE (OUTPATIENT)
Dept: FAMILY MEDICINE CLINIC | Facility: CLINIC | Age: 59
End: 2019-09-06

## 2019-09-06 RX ORDER — CEFUROXIME AXETIL 500 MG/1
500 TABLET ORAL 2 TIMES DAILY
Qty: 20 TABLET | Refills: 0 | Status: SHIPPED | OUTPATIENT
Start: 2019-09-06 | End: 2019-09-16

## 2019-09-06 NOTE — TELEPHONE ENCOUNTER
VM MESSAGE.  Patient is requesting antibiotic to be sent to pharmacy. She has known ILD and has a bronchial, barky cough and she can taste infection in sputum. Thank you.

## 2019-09-15 RX ORDER — BUDESONIDE AND FORMOTEROL FUMARATE DIHYDRATE 160; 4.5 UG/1; UG/1
AEROSOL RESPIRATORY (INHALATION)
Qty: 10.2 G | Refills: 3 | Status: SHIPPED | OUTPATIENT
Start: 2019-09-15 | End: 2021-09-11 | Stop reason: SDUPTHER

## 2019-10-08 RX ORDER — HYDROXYCHLOROQUINE SULFATE 200 MG/1
TABLET, FILM COATED ORAL
Qty: 180 TABLET | Refills: 0 | Status: SHIPPED | OUTPATIENT
Start: 2019-10-08 | End: 2020-01-03 | Stop reason: SDUPTHER

## 2020-01-02 ENCOUNTER — CONVERSION ENCOUNTER (OUTPATIENT)
Dept: RHEUMATOLOGY | Facility: CLINIC | Age: 60
End: 2020-01-02

## 2020-01-02 LAB
C3 SERPL-MCNC: 141 MG/DL (ref 83–193)
C4 SERPL-MCNC: 39 MG/DL (ref 15–57)

## 2020-01-03 RX ORDER — HYDROXYCHLOROQUINE SULFATE 200 MG/1
200 TABLET, FILM COATED ORAL EVERY 12 HOURS
Qty: 180 TABLET | Refills: 0 | Status: SHIPPED | OUTPATIENT
Start: 2020-01-03 | End: 2020-04-07

## 2020-01-08 RX ORDER — FOLIC ACID 1 MG/1
1 TABLET ORAL DAILY
Qty: 90 TABLET | Refills: 0 | Status: SHIPPED | OUTPATIENT
Start: 2020-01-08 | End: 2020-04-07

## 2020-01-21 ENCOUNTER — OFFICE VISIT (OUTPATIENT)
Dept: RHEUMATOLOGY | Facility: CLINIC | Age: 60
End: 2020-01-21

## 2020-01-21 VITALS
DIASTOLIC BLOOD PRESSURE: 82 MMHG | HEART RATE: 69 BPM | WEIGHT: 226 LBS | SYSTOLIC BLOOD PRESSURE: 136 MMHG | BODY MASS INDEX: 41.59 KG/M2 | HEIGHT: 62 IN

## 2020-01-21 DIAGNOSIS — J84.9 INTERSTITIAL LUNG DISEASE (HCC): ICD-10-CM

## 2020-01-21 DIAGNOSIS — R76.8 POSITIVE ANA (ANTINUCLEAR ANTIBODY): ICD-10-CM

## 2020-01-21 DIAGNOSIS — L84 CALLUS OF FOOT: ICD-10-CM

## 2020-01-21 DIAGNOSIS — E55.9 VITAMIN D DEFICIENCY: ICD-10-CM

## 2020-01-21 DIAGNOSIS — M05.79 RHEUMATOID ARTHRITIS INVOLVING MULTIPLE SITES WITH POSITIVE RHEUMATOID FACTOR (HCC): Primary | ICD-10-CM

## 2020-01-21 DIAGNOSIS — M17.11 OSTEOARTHRITIS OF RIGHT KNEE, UNSPECIFIED OSTEOARTHRITIS TYPE: ICD-10-CM

## 2020-01-21 PROCEDURE — 99213 OFFICE O/P EST LOW 20 MIN: CPT | Performed by: NURSE PRACTITIONER

## 2020-01-21 RX ORDER — PREDNISONE 1 MG/1
5 TABLET ORAL DAILY
Qty: 30 TABLET | Refills: 1 | Status: ON HOLD | OUTPATIENT
Start: 2020-01-21 | End: 2022-01-24

## 2020-01-21 NOTE — PROGRESS NOTES
"Subjective   Marieltyson Parker is a 59 y.o. female.     History of Present Illness     Pt is a pleasant 59 y.o. female  Here for follow up on her +LYNEN, rheumatoid arthritis, COPD w/ ILD,  OA of the right knee. Last seen in office in Aug 2019. Recent labs: January 2020: normal CRP, normal complements, CBC showed no leukopenia, anemia or thrombocytopenia. Vitamin D normal.     Currently taking plaquenil 400 mg/d and she is up to date on her eye examination. She takes tylenol as needed and if needed will take a prednisone 2.5 mg tablet for arthritis flares only, reports not having to take this very often. Will have a tramadol as needed for pain.    At the time of her last office visit she was complaining of Rt knee pain. She was given corticosteroid injection that helped her significantly. AM stiffness lasts about 45 min; left 3rd PIP will ache at times. Hands ache w/ weather changes. Denies joint swelling. Denies any red/hot joints. Has a few calluses on her feet; tender.     Review of systems: denies fever. chills at times. Denies dry eyes or mouth. She has a sore on her tongue, takes folic acid for this. Denies N/V.D. Denies CP. She does have SOA, has COPD + ILD. Follows Dr. Garcia and she takes inhalers, theophylline, symbicort & nebs if needed. Just had a chest CT that she claims was stable & showed no change. Denies skin rashes or PS. Energy level is \"up and down.\"  Denies hairloss, no bloody/foamy urine. The remainder of the review of systems reviewed and are (-).    Hand xray in September 2018 showed mild osteoarthritis.  Rt knee x-ray in 8/2019: mild DJD w/ small effusion.    Started on plaquenil per Dr. Gomez  Rheum hx: + LYNNE, + CCP/+RF: she took humira in the past & this was dc'd due to her lung condition.      The following portions of the patient's history were reviewed and updated as appropriate: allergies, current medications, past family history, past medical history, past social history, past surgical " history and problem list.    Review of Systems  See HPI    Objective   Physical Exam   Constitutional: She is oriented to person, place, and time. She appears well-developed and well-nourished.   HENT:   Head: Normocephalic.   Nose: Nose normal.   Eyes: Pupils are equal, round, and reactive to light. Conjunctivae are normal.   Cardiovascular: Normal rate and regular rhythm.   Pulmonary/Chest:   Fine inspiratory crackles are noted anteriorly & posteriorly   Musculoskeletal:   Mild tenderness is noted over the left 3rd DIP,  Tenderness is noted to the medial aspect of the right knee. Mild crepitus is noted to the right knee on examination. No swelling or synovitis is noted.   Neurological: She is alert and oriented to person, place, and time.   Skin: Skin is warm and dry.   Psychiatric: She has a normal mood and affect.   Vitals reviewed.        Assessment/Plan   Mariel was seen today for rheumatoid arthritis.    Diagnoses and all orders for this visit:    Rheumatoid arthritis involving multiple sites with positive rheumatoid factor (CMS/[facility])  Comments:  Stable w/ current therapy  Continue plaquenil 400 mg/d  Future labs  Orders:  -     CBC & Differential; Future  -     Comprehensive Metabolic Panel; Future  -     C-reactive Protein; Future  -     Cyclic Citrul Peptide Antibody, IgG / IgA; Future  -     Rheumatoid Factor; Future  -     Sedimentation Rate; Future  -     C3 Complement; Future  -     C4 Complement; Future  -     Urinalysis With Culture If Indicated -; Future    Vitamin D deficiency  Comments:  Future labs  Orders:  -     Vitamin D 25 Hydroxy; Future    Osteoarthritis of right knee, unspecified osteoarthritis type  Comments:  Improved since injection in Aug. 2019  Discussed conservative measures: ice, support, topicals PRN    Positive LYNNE (antinuclear antibody)  Comments:  Clinically stable. Labs showed normal C3/C4, CBC ok.  Continue plaquenil 400 mg/d  Future lab orders given.  Orders:  -     C3  Complement; Future  -     C4 Complement; Future  -     Urinalysis With Culture If Indicated -; Future    Interstitial lung disease (CMS/HCC)  Comments:  She is under the care of pulmonology    Callus of foot  Comments:  Discussed conservative measures: epsom salt water soaks, moisturize skin, wear comfortable shoes & socks.    Other orders  -     predniSONE (DELTASONE) 5 MG tablet; Take 1 tablet by mouth Daily.        Patient Instructions   Labs reviewed & discussed  Continue current therapy  Discussed the importance of eye examination with plaquenil use. Pt verbalizes understanding.   Prednisone as needed only  Future labs  RTO 6 months

## 2020-01-21 NOTE — PATIENT INSTRUCTIONS
Labs reviewed & discussed  Continue current therapy  Discussed the importance of eye examination with plaquenil use. Pt verbalizes understanding.   Prednisone as needed only  Future labs  RTO 6 months

## 2020-04-07 RX ORDER — FOLIC ACID 1 MG/1
TABLET ORAL
Qty: 90 TABLET | Refills: 0 | Status: SHIPPED | OUTPATIENT
Start: 2020-04-07 | End: 2022-08-28

## 2020-04-07 RX ORDER — HYDROXYCHLOROQUINE SULFATE 200 MG/1
TABLET, FILM COATED ORAL
Qty: 180 TABLET | Refills: 0 | Status: ON HOLD | OUTPATIENT
Start: 2020-04-07 | End: 2022-01-24

## 2020-04-30 LAB
25(OH)D3+25(OH)D2 SERPL-MCNC: 46.3 NG/ML (ref 30–100)
ALBUMIN SERPL-MCNC: 4.3 G/DL (ref 3.8–4.9)
ALBUMIN/GLOB SERPL: 1.6 {RATIO} (ref 1.2–2.2)
ALP SERPL-CCNC: 77 IU/L (ref 39–117)
ALT SERPL-CCNC: 13 IU/L (ref 0–32)
AMBIG ABBREV CMP14 DEFAULT: NORMAL
APPEARANCE UR: CLEAR
AST SERPL-CCNC: 16 IU/L (ref 0–40)
BACTERIA #/AREA URNS HPF: NORMAL /[HPF]
BASOPHILS # BLD AUTO: 0.1 X10E3/UL (ref 0–0.2)
BASOPHILS NFR BLD AUTO: 1 %
BILIRUB SERPL-MCNC: 0.2 MG/DL (ref 0–1.2)
BILIRUB UR QL STRIP: NEGATIVE
BUN SERPL-MCNC: 13 MG/DL (ref 6–24)
BUN/CREAT SERPL: 16 (ref 9–23)
C3 SERPL-MCNC: 116 MG/DL (ref 82–167)
C4 SERPL-MCNC: 28 MG/DL (ref 14–44)
CALCIUM SERPL-MCNC: 9.5 MG/DL (ref 8.7–10.2)
CCP IGA+IGG SERPL IA-ACNC: >250 UNITS (ref 0–19)
CHLORIDE SERPL-SCNC: 101 MMOL/L (ref 96–106)
CO2 SERPL-SCNC: 26 MMOL/L (ref 20–29)
COLOR UR: YELLOW
CREAT SERPL-MCNC: 0.8 MG/DL (ref 0.57–1)
CRP SERPL-MCNC: 4 MG/L (ref 0–10)
EOSINOPHIL # BLD AUTO: 0.3 X10E3/UL (ref 0–0.4)
EOSINOPHIL NFR BLD AUTO: 5 %
EPI CELLS #/AREA URNS HPF: NORMAL /HPF (ref 0–10)
ERYTHROCYTE [DISTWIDTH] IN BLOOD BY AUTOMATED COUNT: 13 % (ref 11.7–15.4)
ERYTHROCYTE [SEDIMENTATION RATE] IN BLOOD BY WESTERGREN METHOD: 40 MM/HR (ref 0–40)
GLOBULIN SER CALC-MCNC: 2.7 G/DL (ref 1.5–4.5)
GLUCOSE SERPL-MCNC: 92 MG/DL (ref 65–99)
GLUCOSE UR QL: NEGATIVE
HCT VFR BLD AUTO: 40 % (ref 34–46.6)
HGB BLD-MCNC: 12.8 G/DL (ref 11.1–15.9)
HGB UR QL STRIP: NEGATIVE
IMM GRANULOCYTES # BLD AUTO: 0 X10E3/UL (ref 0–0.1)
IMM GRANULOCYTES NFR BLD AUTO: 0 %
KETONES UR QL STRIP: NEGATIVE
LEUKOCYTE ESTERASE UR QL STRIP: NEGATIVE
LYMPHOCYTES # BLD AUTO: 1.6 X10E3/UL (ref 0.7–3.1)
LYMPHOCYTES NFR BLD AUTO: 28 %
MCH RBC QN AUTO: 29.2 PG (ref 26.6–33)
MCHC RBC AUTO-ENTMCNC: 32 G/DL (ref 31.5–35.7)
MCV RBC AUTO: 91 FL (ref 79–97)
MICRO URNS: NORMAL
MICRO URNS: NORMAL
MONOCYTES # BLD AUTO: 0.6 X10E3/UL (ref 0.1–0.9)
MONOCYTES NFR BLD AUTO: 11 %
MUCOUS THREADS URNS QL MICRO: PRESENT
NEUTROPHILS # BLD AUTO: 3.2 X10E3/UL (ref 1.4–7)
NEUTROPHILS NFR BLD AUTO: 55 %
NITRITE UR QL STRIP: NEGATIVE
PH UR STRIP: 7.5 [PH] (ref 5–7.5)
PLATELET # BLD AUTO: 247 X10E3/UL (ref 150–450)
POTASSIUM SERPL-SCNC: 5.1 MMOL/L (ref 3.5–5.2)
PROT SERPL-MCNC: 7 G/DL (ref 6–8.5)
PROT UR QL STRIP: NEGATIVE
RBC # BLD AUTO: 4.39 X10E6/UL (ref 3.77–5.28)
RBC #/AREA URNS HPF: NORMAL /HPF (ref 0–2)
RHEUMATOID FACT SERPL-ACNC: 173.8 IU/ML (ref 0–13.9)
SODIUM SERPL-SCNC: 139 MMOL/L (ref 134–144)
SP GR UR: 1.01 (ref 1–1.03)
URINALYSIS REFLEX: NORMAL
UROBILINOGEN UR STRIP-MCNC: 0.2 MG/DL (ref 0.2–1)
WBC # BLD AUTO: 5.9 X10E3/UL (ref 3.4–10.8)
WBC #/AREA URNS HPF: NORMAL /HPF (ref 0–5)

## 2020-05-05 ENCOUNTER — OFFICE VISIT (OUTPATIENT)
Dept: RHEUMATOLOGY | Facility: CLINIC | Age: 60
End: 2020-05-05

## 2020-05-05 DIAGNOSIS — M17.11 PRIMARY OSTEOARTHRITIS OF RIGHT KNEE: ICD-10-CM

## 2020-05-05 DIAGNOSIS — M05.79 RHEUMATOID ARTHRITIS INVOLVING MULTIPLE SITES WITH POSITIVE RHEUMATOID FACTOR (HCC): Primary | ICD-10-CM

## 2020-05-05 DIAGNOSIS — J84.9 INTERSTITIAL LUNG DISEASE (HCC): ICD-10-CM

## 2020-05-05 PROCEDURE — 99441 PR PHYS/QHP TELEPHONE EVALUATION 5-10 MIN: CPT | Performed by: NURSE PRACTITIONER

## 2020-05-05 NOTE — PATIENT INSTRUCTIONS
Pt is stable with current therapy; continue therapy no med change  Discussed the importance of eye examination with plaquenil use. Pt verbalizes understanding.   Labs reviewed and discussed  Notes from Dr. Garcia.   Follow up with Rheumatologist in 3-4 months

## 2020-05-05 NOTE — PROGRESS NOTES
"Subjective   Mariel WENDY Parker is a 59 y.o. female.     History of Present Illness     TELEPHONE VISIT    This visit has been rescheduled as a phone visit to comply with patient safety concerns in accordance with CDC recommendations. Total time of discussion was 10 minutes.    You have chosen to receive care through a telephone visit. Do you consent to use a telephone visit for your medical care today? Yes    Pt is a pleasant 59 y.o. female  +LYNNE, rheumatoid arthritis (+CCP & +RF), COPD w/ ILD,  OA of the right knee. Last seen in office in January 2020. Recent labs:4-: normal ESR, CRP, vitamin D, C3/C4, and  CBC unremarkable.     Currently taking plaquenil 400 mg/d and she is up to date on her eye examination. She takes tylenol as needed and if needed will take a prednisone 2.5 mg tablet for arthritis flares only, reports not having to take this very often. Will have a tramadol as needed for pain.      . AM stiffness lasts about 45 minutes but varies day to day. Her left 3rd PIP will ache at times. Hands ache w/ weather changes.  Denies joint swelling. Denies any red/hot joints. Has a few calluses on her feet; tender. Rt knee is \"better\" since receiving corticosteroid injection in Aug 2019.      Review of systems: denies fever. chills at times. Denies dry eyes or mouth. No nasal or oral lesions at this time. Denies N/V.D. Denies CP. She does have SOA, has COPD + ILD. Follows Dr. Garcia and she takes inhalers: theophylline, symbicort & nebs if needed. Reports things are stable. Denies skin rashes or PS. Energy level is \"up and down.\"  Denies hairloss, no bloody/foamy urine. The remainder of the review of systems reviewed and are (-).     Hand xray in September 2018 showed mild osteoarthritis.  Rt knee x-ray in 8/2019: mild DJD w/ small effusion.     Started on plaquenil per Dr. Gomez  Rheum hx: + LYNNE, + CCP/+RF: she took humira in the past & this was dc'd due to her lung condition.    Care considerations:  ILD: " under the care of pulmonology (Dr. Garcia). PFT June 2019: FEV1 54%, total lung capacity 61%, DLCO 46%-no much change from 2017           The following portions of the patient's history were reviewed and updated as appropriate: allergies, current medications, past family history, past medical history, past social history, past surgical history and problem list.    Review of Systems  See HPI    Objective   Physical Exam   Constitutional:   TELEPHONE VISIT         Assessment/Plan   Mariel was seen today for rheumatoid arthritis.    Diagnoses and all orders for this visit:    Rheumatoid arthritis involving multiple sites with positive rheumatoid factor (CMS/[facility])  Comments:  On plaquenil therapy; stable at this time. Continue meds, no change.  Labs reviewed: ESR, CRP normal. Discussed labs with pt.     Primary osteoarthritis of right knee  Comments:  Improved since having corticosteroid injection.    Interstitial lung disease (CMS/[facility])  Comments:  Pt is under the care of pulmonology (Dr. Garcia).        Patient Instructions   Pt is stable with current therapy; continue therapy no med change  Discussed the importance of eye examination with plaquenil use. Pt verbalizes understanding.   Labs reviewed and discussed  Notes from Dr. Garcia.   Follow up with Rheumatologist in 3-4 months

## 2020-05-11 ENCOUNTER — TELEPHONE (OUTPATIENT)
Dept: RHEUMATOLOGY | Facility: CLINIC | Age: 60
End: 2020-05-11

## 2020-06-05 RX ORDER — LOSARTAN POTASSIUM 100 MG/1
100 TABLET ORAL EVERY 24 HOURS
Qty: 90 TABLET | Refills: 2 | Status: SHIPPED | OUTPATIENT
Start: 2020-06-05

## 2020-06-05 RX ORDER — CITALOPRAM 40 MG/1
40 TABLET ORAL EVERY MORNING
Qty: 90 TABLET | Refills: 2 | Status: SHIPPED | OUTPATIENT
Start: 2020-06-05

## 2020-06-05 NOTE — TELEPHONE ENCOUNTER
PATIENT REQUESTED A REFILL ON:losartan (COZAAR) 100 MG tablet  citalopram (CeleXA) 40 MG tablet   on both she dose get a 90 day supply she states.     PATIENT CAN BE REACHED ON:[phone]    PHARMACY PREFERRED:[facility] - [place], [place] - [address] - [phone] [facility] [phone] FX

## 2020-07-31 ENCOUNTER — OFFICE (AMBULATORY)
Dept: URBAN - METROPOLITAN AREA CLINIC 64 | Facility: CLINIC | Age: 60
End: 2020-07-31
Payer: COMMERCIAL

## 2020-07-31 VITALS
HEIGHT: 62 IN | HEART RATE: 72 BPM | WEIGHT: 221 LBS | DIASTOLIC BLOOD PRESSURE: 77 MMHG | SYSTOLIC BLOOD PRESSURE: 146 MMHG

## 2020-07-31 DIAGNOSIS — K21.9 GASTRO-ESOPHAGEAL REFLUX DISEASE WITHOUT ESOPHAGITIS: ICD-10-CM

## 2020-07-31 DIAGNOSIS — K29.40 CHRONIC ATROPHIC GASTRITIS WITHOUT BLEEDING: ICD-10-CM

## 2020-07-31 PROCEDURE — 99213 OFFICE O/P EST LOW 20 MIN: CPT | Performed by: INTERNAL MEDICINE

## 2020-07-31 RX ORDER — PANTOPRAZOLE 40 MG/1
TABLET, DELAYED RELEASE ORAL
Qty: 180 | Refills: 3 | Status: ACTIVE

## 2021-05-21 PROCEDURE — 87086 URINE CULTURE/COLONY COUNT: CPT | Performed by: FAMILY MEDICINE

## 2021-08-04 ENCOUNTER — TRANSCRIBE ORDERS (OUTPATIENT)
Dept: ADMINISTRATIVE | Facility: HOSPITAL | Age: 61
End: 2021-08-04

## 2021-08-04 ENCOUNTER — OFFICE (AMBULATORY)
Dept: URBAN - METROPOLITAN AREA CLINIC 64 | Facility: CLINIC | Age: 61
End: 2021-08-04

## 2021-08-04 VITALS
WEIGHT: 232 LBS | HEIGHT: 62 IN | HEART RATE: 83 BPM | DIASTOLIC BLOOD PRESSURE: 82 MMHG | SYSTOLIC BLOOD PRESSURE: 152 MMHG

## 2021-08-04 DIAGNOSIS — R15.2 FECAL URGENCY: ICD-10-CM

## 2021-08-04 DIAGNOSIS — R10.11 ABDOMINAL PAIN, RIGHT UPPER QUADRANT: ICD-10-CM

## 2021-08-04 DIAGNOSIS — K21.0 GASTRO-ESOPHAGEAL REFLUX DISEASE WITH ESOPHAGITIS: ICD-10-CM

## 2021-08-04 DIAGNOSIS — R10.11 RIGHT UPPER QUADRANT PAIN: ICD-10-CM

## 2021-08-04 DIAGNOSIS — K21.00 GASTROESOPHAGEAL REFLUX DISEASE WITH ESOPHAGITIS, UNSPECIFIED WHETHER HEMORRHAGE: Primary | ICD-10-CM

## 2021-08-04 DIAGNOSIS — Z12.11 SCREENING FOR MALIGNANT NEOPLASM OF COLON: ICD-10-CM

## 2021-08-04 PROCEDURE — 99214 OFFICE O/P EST MOD 30 MIN: CPT | Performed by: INTERNAL MEDICINE

## 2021-08-04 RX ORDER — PANTOPRAZOLE 40 MG/1
TABLET, DELAYED RELEASE ORAL
Qty: 180 | Refills: 3 | Status: ACTIVE

## 2021-08-13 ENCOUNTER — HOSPITAL ENCOUNTER (OUTPATIENT)
Dept: NUCLEAR MEDICINE | Facility: HOSPITAL | Age: 61
Discharge: HOME | End: 2021-08-13

## 2021-08-13 ENCOUNTER — HOSPITAL ENCOUNTER (OUTPATIENT)
Dept: ULTRASOUND IMAGING | Facility: HOSPITAL | Age: 61
Discharge: HOME | End: 2021-08-13
Admitting: INTERNAL MEDICINE

## 2021-08-13 DIAGNOSIS — R15.2 FECAL URGENCY: ICD-10-CM

## 2021-08-13 DIAGNOSIS — R10.11 ABDOMINAL PAIN, RIGHT UPPER QUADRANT: ICD-10-CM

## 2021-08-13 DIAGNOSIS — Z12.11 SCREENING FOR MALIGNANT NEOPLASM OF COLON: ICD-10-CM

## 2021-08-13 DIAGNOSIS — K21.00 GASTROESOPHAGEAL REFLUX DISEASE WITH ESOPHAGITIS, UNSPECIFIED WHETHER HEMORRHAGE: ICD-10-CM

## 2021-08-13 PROCEDURE — 0 TECHNETIUM TC 99M MEBROFENIN KIT: Performed by: INTERNAL MEDICINE

## 2021-08-13 PROCEDURE — A9537 TC99M MEBROFENIN: HCPCS | Performed by: INTERNAL MEDICINE

## 2021-08-13 PROCEDURE — 76705 ECHO EXAM OF ABDOMEN: CPT

## 2021-08-13 PROCEDURE — 78227 HEPATOBIL SYST IMAGE W/DRUG: CPT

## 2021-08-13 RX ORDER — KIT FOR THE PREPARATION OF TECHNETIUM TC 99M MEBROFENIN 45 MG/10ML
1 INJECTION, POWDER, LYOPHILIZED, FOR SOLUTION INTRAVENOUS
Status: COMPLETED | OUTPATIENT
Start: 2021-08-13 | End: 2021-08-13

## 2021-08-13 RX ADMIN — MEBROFENIN 1 DOSE: 45 INJECTION, POWDER, LYOPHILIZED, FOR SOLUTION INTRAVENOUS at 10:25

## 2022-01-10 ENCOUNTER — OFFICE (AMBULATORY)
Dept: URBAN - METROPOLITAN AREA PATHOLOGY 4 | Facility: PATHOLOGY | Age: 62
End: 2022-01-10
Payer: COMMERCIAL

## 2022-01-10 ENCOUNTER — OFFICE (AMBULATORY)
Dept: URBAN - METROPOLITAN AREA PATHOLOGY 4 | Facility: PATHOLOGY | Age: 62
End: 2022-01-10

## 2022-01-10 ENCOUNTER — ON CAMPUS - OUTPATIENT (AMBULATORY)
Dept: URBAN - METROPOLITAN AREA HOSPITAL 2 | Facility: HOSPITAL | Age: 62
End: 2022-01-10
Payer: MEDICARE

## 2022-01-10 VITALS
RESPIRATION RATE: 17 BRPM | SYSTOLIC BLOOD PRESSURE: 143 MMHG | DIASTOLIC BLOOD PRESSURE: 54 MMHG | DIASTOLIC BLOOD PRESSURE: 82 MMHG | OXYGEN SATURATION: 100 % | HEIGHT: 61 IN | SYSTOLIC BLOOD PRESSURE: 131 MMHG | RESPIRATION RATE: 16 BRPM | SYSTOLIC BLOOD PRESSURE: 149 MMHG | HEART RATE: 74 BPM | SYSTOLIC BLOOD PRESSURE: 123 MMHG | HEART RATE: 92 BPM | RESPIRATION RATE: 19 BRPM | DIASTOLIC BLOOD PRESSURE: 79 MMHG | SYSTOLIC BLOOD PRESSURE: 108 MMHG | HEART RATE: 73 BPM | HEART RATE: 82 BPM | DIASTOLIC BLOOD PRESSURE: 62 MMHG | HEART RATE: 88 BPM | SYSTOLIC BLOOD PRESSURE: 127 MMHG | DIASTOLIC BLOOD PRESSURE: 87 MMHG | SYSTOLIC BLOOD PRESSURE: 151 MMHG | SYSTOLIC BLOOD PRESSURE: 154 MMHG | HEART RATE: 79 BPM | SYSTOLIC BLOOD PRESSURE: 167 MMHG | DIASTOLIC BLOOD PRESSURE: 71 MMHG | OXYGEN SATURATION: 96 % | DIASTOLIC BLOOD PRESSURE: 89 MMHG | SYSTOLIC BLOOD PRESSURE: 119 MMHG | HEART RATE: 77 BPM | WEIGHT: 233 LBS | SYSTOLIC BLOOD PRESSURE: 181 MMHG | OXYGEN SATURATION: 98 % | DIASTOLIC BLOOD PRESSURE: 85 MMHG | DIASTOLIC BLOOD PRESSURE: 74 MMHG | HEART RATE: 96 BPM | DIASTOLIC BLOOD PRESSURE: 76 MMHG | HEART RATE: 85 BPM | TEMPERATURE: 96.9 F | OXYGEN SATURATION: 99 %

## 2022-01-10 DIAGNOSIS — K31.89 OTHER DISEASES OF STOMACH AND DUODENUM: ICD-10-CM

## 2022-01-10 DIAGNOSIS — D12.2 BENIGN NEOPLASM OF ASCENDING COLON: ICD-10-CM

## 2022-01-10 DIAGNOSIS — K21.00 GASTRO-ESOPHAGEAL REFLUX DISEASE WITH ESOPHAGITIS, WITHOUT B: ICD-10-CM

## 2022-01-10 DIAGNOSIS — K57.30 DIVERTICULOSIS OF LARGE INTESTINE WITHOUT PERFORATION OR ABS: ICD-10-CM

## 2022-01-10 DIAGNOSIS — K44.9 DIAPHRAGMATIC HERNIA WITHOUT OBSTRUCTION OR GANGRENE: ICD-10-CM

## 2022-01-10 DIAGNOSIS — R13.10 DYSPHAGIA, UNSPECIFIED: ICD-10-CM

## 2022-01-10 DIAGNOSIS — K31.7 POLYP OF STOMACH AND DUODENUM: ICD-10-CM

## 2022-01-10 DIAGNOSIS — Z12.11 ENCOUNTER FOR SCREENING FOR MALIGNANT NEOPLASM OF COLON: ICD-10-CM

## 2022-01-10 PROBLEM — K20.80 OTHER ESOPHAGITIS WITHOUT BLEEDING: Status: ACTIVE | Noted: 2022-01-10

## 2022-01-10 PROBLEM — K63.5 POLYP OF COLON: Status: ACTIVE | Noted: 2022-01-10

## 2022-01-10 LAB
GI HISTOLOGY: A. SELECT: (no result)
GI HISTOLOGY: B. SELECT: (no result)
GI HISTOLOGY: C. UNSPECIFIED: (no result)
GI HISTOLOGY: PDF REPORT: (no result)

## 2022-01-10 PROCEDURE — 88305 TISSUE EXAM BY PATHOLOGIST: CPT | Mod: 26 | Performed by: INTERNAL MEDICINE

## 2022-01-10 PROCEDURE — 45385 COLONOSCOPY W/LESION REMOVAL: CPT | Mod: PT | Performed by: INTERNAL MEDICINE

## 2022-01-10 PROCEDURE — 43450 DILATE ESOPHAGUS 1/MULT PASS: CPT | Performed by: INTERNAL MEDICINE

## 2022-01-10 PROCEDURE — 43251 EGD REMOVE LESION SNARE: CPT | Performed by: INTERNAL MEDICINE

## 2022-01-10 PROCEDURE — 43239 EGD BIOPSY SINGLE/MULTIPLE: CPT | Mod: 59 | Performed by: INTERNAL MEDICINE

## 2022-01-24 ENCOUNTER — APPOINTMENT (OUTPATIENT)
Dept: GENERAL RADIOLOGY | Facility: HOSPITAL | Age: 62
End: 2022-01-24

## 2022-01-24 ENCOUNTER — HOSPITAL ENCOUNTER (INPATIENT)
Facility: HOSPITAL | Age: 62
LOS: 5 days | Discharge: HOME | End: 2022-01-29
Attending: EMERGENCY MEDICINE | Admitting: INTERNAL MEDICINE

## 2022-01-24 DIAGNOSIS — M54.50 ACUTE BILATERAL LOW BACK PAIN WITHOUT SCIATICA: ICD-10-CM

## 2022-01-24 DIAGNOSIS — J18.9 PNEUMONIA: ICD-10-CM

## 2022-01-24 DIAGNOSIS — R09.02 HYPOXIA: ICD-10-CM

## 2022-01-24 DIAGNOSIS — U07.1 PNEUMONIA DUE TO COVID-19 VIRUS: Primary | ICD-10-CM

## 2022-01-24 DIAGNOSIS — J12.82 PNEUMONIA DUE TO COVID-19 VIRUS: Primary | ICD-10-CM

## 2022-01-24 LAB
ALBUMIN SERPL-MCNC: 4.2 G/DL (ref 3.5–5.2)
ALBUMIN/GLOB SERPL: 1.2 G/DL
ALP SERPL-CCNC: 74 U/L (ref 39–117)
ALT SERPL W P-5'-P-CCNC: 15 U/L (ref 1–33)
ANION GAP SERPL CALCULATED.3IONS-SCNC: 12 MMOL/L (ref 5–15)
AST SERPL-CCNC: 33 U/L (ref 1–32)
BASOPHILS # BLD AUTO: 0 10*3/MM3 (ref 0–0.2)
BASOPHILS NFR BLD AUTO: 0.2 % (ref 0–1.5)
BILIRUB SERPL-MCNC: 0.3 MG/DL (ref 0–1.2)
BUN SERPL-MCNC: 9 MG/DL (ref 8–23)
BUN/CREAT SERPL: 11.5 (ref 7–25)
CALCIUM SPEC-SCNC: 9.1 MG/DL (ref 8.6–10.5)
CHLORIDE SERPL-SCNC: 97 MMOL/L (ref 98–107)
CO2 SERPL-SCNC: 28 MMOL/L (ref 22–29)
CREAT SERPL-MCNC: 0.78 MG/DL (ref 0.57–1)
D-LACTATE SERPL-SCNC: 1.3 MMOL/L (ref 0.5–2)
DEPRECATED RDW RBC AUTO: 43.8 FL (ref 37–54)
EOSINOPHIL # BLD AUTO: 0 10*3/MM3 (ref 0–0.4)
EOSINOPHIL NFR BLD AUTO: 0 % (ref 0.3–6.2)
ERYTHROCYTE [DISTWIDTH] IN BLOOD BY AUTOMATED COUNT: 14.3 % (ref 12.3–15.4)
GFR SERPL CREATININE-BSD FRML MDRD: 75 ML/MIN/1.73
GLOBULIN UR ELPH-MCNC: 3.4 GM/DL
GLUCOSE SERPL-MCNC: 101 MG/DL (ref 65–99)
HCT VFR BLD AUTO: 38.3 % (ref 34–46.6)
HGB BLD-MCNC: 13 G/DL (ref 12–15.9)
HOLD SPECIMEN: NORMAL
HOLD SPECIMEN: NORMAL
LYMPHOCYTES # BLD AUTO: 0.3 10*3/MM3 (ref 0.7–3.1)
LYMPHOCYTES NFR BLD AUTO: 5.9 % (ref 19.6–45.3)
MCH RBC QN AUTO: 29.9 PG (ref 26.6–33)
MCHC RBC AUTO-ENTMCNC: 33.9 G/DL (ref 31.5–35.7)
MCV RBC AUTO: 88.3 FL (ref 79–97)
MONOCYTES # BLD AUTO: 0.6 10*3/MM3 (ref 0.1–0.9)
MONOCYTES NFR BLD AUTO: 12.4 % (ref 5–12)
NEUTROPHILS NFR BLD AUTO: 3.7 10*3/MM3 (ref 1.7–7)
NEUTROPHILS NFR BLD AUTO: 81.5 % (ref 42.7–76)
NRBC BLD AUTO-RTO: 0.1 /100 WBC (ref 0–0.2)
PLATELET # BLD AUTO: 271 10*3/MM3 (ref 140–450)
PMV BLD AUTO: 8.4 FL (ref 6–12)
POTASSIUM SERPL-SCNC: 3.6 MMOL/L (ref 3.5–5.2)
PROCALCITONIN SERPL-MCNC: 0.06 NG/ML (ref 0–0.25)
PROT SERPL-MCNC: 7.6 G/DL (ref 6–8.5)
RBC # BLD AUTO: 4.34 10*6/MM3 (ref 3.77–5.28)
SODIUM SERPL-SCNC: 137 MMOL/L (ref 136–145)
TROPONIN T SERPL-MCNC: <0.01 NG/ML (ref 0–0.03)
WBC NRBC COR # BLD: 4.5 10*3/MM3 (ref 3.4–10.8)
WHOLE BLOOD HOLD SPECIMEN: NORMAL
WHOLE BLOOD HOLD SPECIMEN: NORMAL

## 2022-01-24 PROCEDURE — 80053 COMPREHEN METABOLIC PANEL: CPT | Performed by: EMERGENCY MEDICINE

## 2022-01-24 PROCEDURE — 71045 X-RAY EXAM CHEST 1 VIEW: CPT

## 2022-01-24 PROCEDURE — 25010000002 DEXAMETHASONE PER 1 MG: Performed by: EMERGENCY MEDICINE

## 2022-01-24 PROCEDURE — 87040 BLOOD CULTURE FOR BACTERIA: CPT | Performed by: EMERGENCY MEDICINE

## 2022-01-24 PROCEDURE — 93005 ELECTROCARDIOGRAM TRACING: CPT | Performed by: PHYSICIAN ASSISTANT

## 2022-01-24 PROCEDURE — 84484 ASSAY OF TROPONIN QUANT: CPT | Performed by: EMERGENCY MEDICINE

## 2022-01-24 PROCEDURE — 25010000002 ENOXAPARIN PER 10 MG: Performed by: NURSE PRACTITIONER

## 2022-01-24 PROCEDURE — 99284 EMERGENCY DEPT VISIT MOD MDM: CPT

## 2022-01-24 PROCEDURE — 83605 ASSAY OF LACTIC ACID: CPT

## 2022-01-24 PROCEDURE — 84145 PROCALCITONIN (PCT): CPT | Performed by: EMERGENCY MEDICINE

## 2022-01-24 PROCEDURE — 85025 COMPLETE CBC W/AUTO DIFF WBC: CPT | Performed by: EMERGENCY MEDICINE

## 2022-01-24 RX ORDER — ACETAMINOPHEN 325 MG/1
650 TABLET ORAL EVERY 6 HOURS PRN
Status: DISCONTINUED | OUTPATIENT
Start: 2022-01-24 | End: 2022-01-30 | Stop reason: HOSPADM

## 2022-01-24 RX ORDER — FOLIC ACID 1 MG/1
1000 TABLET ORAL DAILY
Status: DISCONTINUED | OUTPATIENT
Start: 2022-01-25 | End: 2022-01-30 | Stop reason: HOSPADM

## 2022-01-24 RX ORDER — PANTOPRAZOLE SODIUM 40 MG/10ML
40 INJECTION, POWDER, LYOPHILIZED, FOR SOLUTION INTRAVENOUS
Status: DISCONTINUED | OUTPATIENT
Start: 2022-01-25 | End: 2022-01-25

## 2022-01-24 RX ORDER — POTASSIUM CHLORIDE 1.5 G/1.77G
40 POWDER, FOR SOLUTION ORAL AS NEEDED
Status: DISCONTINUED | OUTPATIENT
Start: 2022-01-24 | End: 2022-01-30 | Stop reason: HOSPADM

## 2022-01-24 RX ORDER — POTASSIUM CHLORIDE 20 MEQ/1
40 TABLET, EXTENDED RELEASE ORAL AS NEEDED
Status: DISCONTINUED | OUTPATIENT
Start: 2022-01-24 | End: 2022-01-30 | Stop reason: HOSPADM

## 2022-01-24 RX ORDER — GUAIFENESIN 600 MG/1
1200 TABLET, EXTENDED RELEASE ORAL EVERY 12 HOURS SCHEDULED
Status: DISCONTINUED | OUTPATIENT
Start: 2022-01-24 | End: 2022-01-30 | Stop reason: HOSPADM

## 2022-01-24 RX ORDER — BUDESONIDE AND FORMOTEROL FUMARATE DIHYDRATE 160; 4.5 UG/1; UG/1
2 AEROSOL RESPIRATORY (INHALATION)
Status: DISCONTINUED | OUTPATIENT
Start: 2022-01-24 | End: 2022-01-30 | Stop reason: HOSPADM

## 2022-01-24 RX ORDER — LOSARTAN POTASSIUM 50 MG/1
100 TABLET ORAL
Status: DISCONTINUED | OUTPATIENT
Start: 2022-01-24 | End: 2022-01-30 | Stop reason: HOSPADM

## 2022-01-24 RX ORDER — CHOLECALCIFEROL (VITAMIN D3) 125 MCG
5 CAPSULE ORAL NIGHTLY PRN
Status: DISCONTINUED | OUTPATIENT
Start: 2022-01-24 | End: 2022-01-30 | Stop reason: HOSPADM

## 2022-01-24 RX ORDER — CITALOPRAM 20 MG/1
40 TABLET ORAL EVERY MORNING
Status: DISCONTINUED | OUTPATIENT
Start: 2022-01-25 | End: 2022-01-30 | Stop reason: HOSPADM

## 2022-01-24 RX ORDER — MAGNESIUM SULFATE 1 G/100ML
1 INJECTION INTRAVENOUS AS NEEDED
Status: DISCONTINUED | OUTPATIENT
Start: 2022-01-24 | End: 2022-01-30 | Stop reason: HOSPADM

## 2022-01-24 RX ORDER — ONDANSETRON 2 MG/ML
4 INJECTION INTRAMUSCULAR; INTRAVENOUS EVERY 6 HOURS PRN
Status: DISCONTINUED | OUTPATIENT
Start: 2022-01-24 | End: 2022-01-25 | Stop reason: SDUPTHER

## 2022-01-24 RX ORDER — CYCLOBENZAPRINE HCL 10 MG
10 TABLET ORAL NIGHTLY PRN
Status: DISCONTINUED | OUTPATIENT
Start: 2022-01-24 | End: 2022-01-30 | Stop reason: HOSPADM

## 2022-01-24 RX ORDER — MAGNESIUM SULFATE HEPTAHYDRATE 40 MG/ML
2 INJECTION, SOLUTION INTRAVENOUS AS NEEDED
Status: DISCONTINUED | OUTPATIENT
Start: 2022-01-24 | End: 2022-01-30 | Stop reason: HOSPADM

## 2022-01-24 RX ORDER — FAMOTIDINE 40 MG/1
40 TABLET, FILM COATED ORAL DAILY
COMMUNITY

## 2022-01-24 RX ORDER — CETIRIZINE HYDROCHLORIDE 10 MG/1
10 TABLET ORAL DAILY
Status: DISCONTINUED | OUTPATIENT
Start: 2022-01-25 | End: 2022-01-30 | Stop reason: HOSPADM

## 2022-01-24 RX ORDER — BENZONATATE 100 MG/1
100 CAPSULE ORAL 3 TIMES DAILY PRN
Status: DISCONTINUED | OUTPATIENT
Start: 2022-01-24 | End: 2022-01-30 | Stop reason: HOSPADM

## 2022-01-24 RX ORDER — SODIUM CHLORIDE 0.9 % (FLUSH) 0.9 %
10 SYRINGE (ML) INJECTION AS NEEDED
Status: DISCONTINUED | OUTPATIENT
Start: 2022-01-24 | End: 2022-01-30 | Stop reason: HOSPADM

## 2022-01-24 RX ORDER — THEOPHYLLINE 300 MG/1
300 TABLET, EXTENDED RELEASE ORAL EVERY 24 HOURS
Status: DISCONTINUED | OUTPATIENT
Start: 2022-01-24 | End: 2022-01-25

## 2022-01-24 RX ORDER — HYDROXYCHLOROQUINE SULFATE 200 MG/1
200 TABLET, FILM COATED ORAL EVERY 12 HOURS
Status: DISCONTINUED | OUTPATIENT
Start: 2022-01-24 | End: 2022-01-25

## 2022-01-24 RX ORDER — POTASSIUM CHLORIDE 7.45 MG/ML
10 INJECTION INTRAVENOUS
Status: DISCONTINUED | OUTPATIENT
Start: 2022-01-24 | End: 2022-01-30 | Stop reason: HOSPADM

## 2022-01-24 RX ORDER — DEXAMETHASONE SODIUM PHOSPHATE 4 MG/ML
6 INJECTION, SOLUTION INTRA-ARTICULAR; INTRALESIONAL; INTRAMUSCULAR; INTRAVENOUS; SOFT TISSUE ONCE
Status: COMPLETED | OUTPATIENT
Start: 2022-01-24 | End: 2022-01-24

## 2022-01-24 RX ORDER — IPRATROPIUM BROMIDE AND ALBUTEROL SULFATE 2.5; .5 MG/3ML; MG/3ML
3 SOLUTION RESPIRATORY (INHALATION)
Status: DISCONTINUED | OUTPATIENT
Start: 2022-01-24 | End: 2022-01-25

## 2022-01-24 RX ORDER — DEXAMETHASONE SODIUM PHOSPHATE 4 MG/ML
6 INJECTION, SOLUTION INTRA-ARTICULAR; INTRALESIONAL; INTRAMUSCULAR; INTRAVENOUS; SOFT TISSUE DAILY
Status: DISCONTINUED | OUTPATIENT
Start: 2022-01-25 | End: 2022-01-25

## 2022-01-24 RX ORDER — MONTELUKAST SODIUM 10 MG/1
10 TABLET ORAL NIGHTLY
Status: DISCONTINUED | OUTPATIENT
Start: 2022-01-24 | End: 2022-01-30 | Stop reason: HOSPADM

## 2022-01-24 RX ADMIN — DOXYCYCLINE 100 MG: 100 INJECTION, POWDER, LYOPHILIZED, FOR SOLUTION INTRAVENOUS at 23:54

## 2022-01-24 RX ADMIN — GUAIFENESIN 1200 MG: 600 TABLET, EXTENDED RELEASE ORAL at 23:55

## 2022-01-24 RX ADMIN — BENZONATATE 100 MG: 100 CAPSULE ORAL at 23:55

## 2022-01-24 RX ADMIN — DEXAMETHASONE SODIUM PHOSPHATE 6 MG: 4 INJECTION, SOLUTION INTRA-ARTICULAR; INTRALESIONAL; INTRAMUSCULAR; INTRAVENOUS; SOFT TISSUE at 19:58

## 2022-01-24 RX ADMIN — ENOXAPARIN SODIUM 40 MG: 40 INJECTION SUBCUTANEOUS at 23:55

## 2022-01-24 RX ADMIN — LOSARTAN POTASSIUM 100 MG: 25 TABLET, FILM COATED ORAL at 23:55

## 2022-01-24 RX ADMIN — MONTELUKAST 10 MG: 10 TABLET, FILM COATED ORAL at 23:55

## 2022-01-25 ENCOUNTER — APPOINTMENT (OUTPATIENT)
Dept: CT IMAGING | Facility: HOSPITAL | Age: 62
End: 2022-01-25

## 2022-01-25 PROBLEM — U07.1 PNEUMONIA DUE TO COVID-19 VIRUS: Status: ACTIVE | Noted: 2022-01-25

## 2022-01-25 PROBLEM — J12.82 PNEUMONIA DUE TO COVID-19 VIRUS: Status: ACTIVE | Noted: 2022-01-25

## 2022-01-25 LAB
ALBUMIN SERPL-MCNC: 3.8 G/DL (ref 3.5–5.2)
ALBUMIN/GLOB SERPL: 1.1 G/DL
ALP SERPL-CCNC: 68 U/L (ref 39–117)
ALT SERPL W P-5'-P-CCNC: 15 U/L (ref 1–33)
ANION GAP SERPL CALCULATED.3IONS-SCNC: 15 MMOL/L (ref 5–15)
AST SERPL-CCNC: 34 U/L (ref 1–32)
BASOPHILS # BLD AUTO: 0 10*3/MM3 (ref 0–0.2)
BASOPHILS NFR BLD AUTO: 0 % (ref 0–1.5)
BILIRUB CONJ SERPL-MCNC: <0.2 MG/DL (ref 0–0.3)
BILIRUB SERPL-MCNC: 0.3 MG/DL (ref 0–1.2)
BUN SERPL-MCNC: 12 MG/DL (ref 8–23)
BUN/CREAT SERPL: 16 (ref 7–25)
CALCIUM SPEC-SCNC: 9.1 MG/DL (ref 8.6–10.5)
CHLORIDE SERPL-SCNC: 98 MMOL/L (ref 98–107)
CO2 SERPL-SCNC: 26 MMOL/L (ref 22–29)
CREAT SERPL-MCNC: 0.72 MG/DL (ref 0.57–1)
CREAT SERPL-MCNC: 0.75 MG/DL (ref 0.57–1)
DEPRECATED RDW RBC AUTO: 43.8 FL (ref 37–54)
EOSINOPHIL # BLD AUTO: 0 10*3/MM3 (ref 0–0.4)
EOSINOPHIL NFR BLD AUTO: 0 % (ref 0.3–6.2)
ERYTHROCYTE [DISTWIDTH] IN BLOOD BY AUTOMATED COUNT: 14.3 % (ref 12.3–15.4)
GFR SERPL CREATININE-BSD FRML MDRD: 79 ML/MIN/1.73
GFR SERPL CREATININE-BSD FRML MDRD: 82 ML/MIN/1.73
GLOBULIN UR ELPH-MCNC: 3.4 GM/DL
GLUCOSE SERPL-MCNC: 100 MG/DL (ref 65–99)
HCT VFR BLD AUTO: 38.5 % (ref 34–46.6)
HGB BLD-MCNC: 12.8 G/DL (ref 12–15.9)
LYMPHOCYTES # BLD AUTO: 0.5 10*3/MM3 (ref 0.7–3.1)
LYMPHOCYTES NFR BLD AUTO: 12.3 % (ref 19.6–45.3)
MAGNESIUM SERPL-MCNC: 1.5 MG/DL (ref 1.6–2.4)
MCH RBC QN AUTO: 29.5 PG (ref 26.6–33)
MCHC RBC AUTO-ENTMCNC: 33.3 G/DL (ref 31.5–35.7)
MCV RBC AUTO: 88.4 FL (ref 79–97)
MONOCYTES # BLD AUTO: 0.7 10*3/MM3 (ref 0.1–0.9)
MONOCYTES NFR BLD AUTO: 17.8 % (ref 5–12)
NEUTROPHILS NFR BLD AUTO: 2.9 10*3/MM3 (ref 1.7–7)
NEUTROPHILS NFR BLD AUTO: 69.9 % (ref 42.7–76)
NRBC BLD AUTO-RTO: 0 /100 WBC (ref 0–0.2)
PLATELET # BLD AUTO: 291 10*3/MM3 (ref 140–450)
PMV BLD AUTO: 8.8 FL (ref 6–12)
POTASSIUM SERPL-SCNC: 3.9 MMOL/L (ref 3.5–5.2)
PROT SERPL-MCNC: 7.2 G/DL (ref 6–8.5)
RBC # BLD AUTO: 4.35 10*6/MM3 (ref 3.77–5.28)
SODIUM SERPL-SCNC: 139 MMOL/L (ref 136–145)
WBC NRBC COR # BLD: 4.1 10*3/MM3 (ref 3.4–10.8)

## 2022-01-25 PROCEDURE — 25010000002 ENOXAPARIN PER 10 MG: Performed by: INTERNAL MEDICINE

## 2022-01-25 PROCEDURE — 80053 COMPREHEN METABOLIC PANEL: CPT | Performed by: NURSE PRACTITIONER

## 2022-01-25 PROCEDURE — 94640 AIRWAY INHALATION TREATMENT: CPT

## 2022-01-25 PROCEDURE — 94799 UNLISTED PULMONARY SVC/PX: CPT

## 2022-01-25 PROCEDURE — 25010000002 DEXAMETHASONE PER 1 MG: Performed by: NURSE PRACTITIONER

## 2022-01-25 PROCEDURE — 36415 COLL VENOUS BLD VENIPUNCTURE: CPT | Performed by: NURSE PRACTITIONER

## 2022-01-25 PROCEDURE — 83735 ASSAY OF MAGNESIUM: CPT | Performed by: NURSE PRACTITIONER

## 2022-01-25 PROCEDURE — 82565 ASSAY OF CREATININE: CPT | Performed by: NURSE PRACTITIONER

## 2022-01-25 PROCEDURE — 25010000002 MAGNESIUM SULFATE 2 GM/50ML SOLUTION: Performed by: NURSE PRACTITIONER

## 2022-01-25 PROCEDURE — 82248 BILIRUBIN DIRECT: CPT | Performed by: INTERNAL MEDICINE

## 2022-01-25 PROCEDURE — 85025 COMPLETE CBC W/AUTO DIFF WBC: CPT | Performed by: NURSE PRACTITIONER

## 2022-01-25 PROCEDURE — 94664 DEMO&/EVAL PT USE INHALER: CPT

## 2022-01-25 PROCEDURE — 25010000002 REMDESIVIR 100 MG RECONSTITUTED SOLUTION: Performed by: NURSE PRACTITIONER

## 2022-01-25 PROCEDURE — 71250 CT THORAX DX C-: CPT

## 2022-01-25 RX ORDER — FAMOTIDINE 20 MG/1
40 TABLET, FILM COATED ORAL DAILY
Status: DISCONTINUED | OUTPATIENT
Start: 2022-01-25 | End: 2022-01-30 | Stop reason: HOSPADM

## 2022-01-25 RX ORDER — SUCRALFATE 1 G/1
1 TABLET ORAL
Status: DISCONTINUED | OUTPATIENT
Start: 2022-01-25 | End: 2022-01-30 | Stop reason: HOSPADM

## 2022-01-25 RX ORDER — ZINC SULFATE 50(220)MG
220 CAPSULE ORAL DAILY
Status: DISCONTINUED | OUTPATIENT
Start: 2022-01-25 | End: 2022-01-30 | Stop reason: HOSPADM

## 2022-01-25 RX ORDER — METHYLPREDNISOLONE 4 MG/1
8 TABLET ORAL DAILY
COMMUNITY
End: 2022-01-29 | Stop reason: HOSPADM

## 2022-01-25 RX ORDER — ACETAMINOPHEN 325 MG/1
650 TABLET ORAL EVERY 4 HOURS PRN
Status: DISCONTINUED | OUTPATIENT
Start: 2022-01-25 | End: 2022-01-30 | Stop reason: HOSPADM

## 2022-01-25 RX ORDER — SODIUM CHLORIDE 0.9 % (FLUSH) 0.9 %
3 SYRINGE (ML) INJECTION EVERY 12 HOURS SCHEDULED
Status: DISCONTINUED | OUTPATIENT
Start: 2022-01-25 | End: 2022-01-30 | Stop reason: HOSPADM

## 2022-01-25 RX ORDER — ALBUTEROL SULFATE 90 UG/1
2 AEROSOL, METERED RESPIRATORY (INHALATION)
Status: DISCONTINUED | OUTPATIENT
Start: 2022-01-25 | End: 2022-01-30 | Stop reason: HOSPADM

## 2022-01-25 RX ORDER — FEXOFENADINE HCL 180 MG/1
180 TABLET ORAL DAILY
COMMUNITY

## 2022-01-25 RX ORDER — ONDANSETRON 4 MG/1
4 TABLET, FILM COATED ORAL EVERY 6 HOURS PRN
Status: DISCONTINUED | OUTPATIENT
Start: 2022-01-25 | End: 2022-01-30 | Stop reason: HOSPADM

## 2022-01-25 RX ORDER — LABETALOL HYDROCHLORIDE 5 MG/ML
10 INJECTION, SOLUTION INTRAVENOUS EVERY 6 HOURS PRN
Status: DISCONTINUED | OUTPATIENT
Start: 2022-01-25 | End: 2022-01-30 | Stop reason: HOSPADM

## 2022-01-25 RX ORDER — METHYLPREDNISOLONE 4 MG/1
4 TABLET ORAL DAILY
COMMUNITY
End: 2022-01-29 | Stop reason: HOSPADM

## 2022-01-25 RX ORDER — HYDRALAZINE HYDROCHLORIDE 20 MG/ML
10 INJECTION INTRAMUSCULAR; INTRAVENOUS EVERY 6 HOURS PRN
Status: DISCONTINUED | OUTPATIENT
Start: 2022-01-25 | End: 2022-01-30 | Stop reason: HOSPADM

## 2022-01-25 RX ORDER — ONDANSETRON 2 MG/ML
4 INJECTION INTRAMUSCULAR; INTRAVENOUS EVERY 6 HOURS PRN
Status: DISCONTINUED | OUTPATIENT
Start: 2022-01-25 | End: 2022-01-30 | Stop reason: HOSPADM

## 2022-01-25 RX ORDER — ASCORBIC ACID 500 MG
500 TABLET ORAL DAILY
Status: DISCONTINUED | OUTPATIENT
Start: 2022-01-25 | End: 2022-01-30 | Stop reason: HOSPADM

## 2022-01-25 RX ORDER — METHYLPREDNISOLONE 4 MG/1
12 TABLET ORAL DAILY
COMMUNITY
End: 2022-01-29 | Stop reason: HOSPADM

## 2022-01-25 RX ORDER — PANTOPRAZOLE SODIUM 40 MG/1
40 TABLET, DELAYED RELEASE ORAL
Status: DISCONTINUED | OUTPATIENT
Start: 2022-01-26 | End: 2022-01-30 | Stop reason: HOSPADM

## 2022-01-25 RX ORDER — DEXAMETHASONE 6 MG/1
6 TABLET ORAL
Status: DISCONTINUED | OUTPATIENT
Start: 2022-01-26 | End: 2022-01-30 | Stop reason: HOSPADM

## 2022-01-25 RX ORDER — SODIUM CHLORIDE 0.9 % (FLUSH) 0.9 %
3-10 SYRINGE (ML) INJECTION AS NEEDED
Status: DISCONTINUED | OUTPATIENT
Start: 2022-01-25 | End: 2022-01-30 | Stop reason: HOSPADM

## 2022-01-25 RX ORDER — NITROGLYCERIN 0.4 MG/1
0.4 TABLET SUBLINGUAL
Status: DISCONTINUED | OUTPATIENT
Start: 2022-01-25 | End: 2022-01-30 | Stop reason: HOSPADM

## 2022-01-25 RX ADMIN — BUDESONIDE AND FORMOTEROL FUMARATE DIHYDRATE 2 PUFF: 160; 4.5 AEROSOL RESPIRATORY (INHALATION) at 06:39

## 2022-01-25 RX ADMIN — GUAIFENESIN 1200 MG: 600 TABLET, EXTENDED RELEASE ORAL at 10:53

## 2022-01-25 RX ADMIN — GUAIFENESIN 1200 MG: 600 TABLET, EXTENDED RELEASE ORAL at 21:43

## 2022-01-25 RX ADMIN — SUCRALFATE 1 G: 1 TABLET ORAL at 21:43

## 2022-01-25 RX ADMIN — ALBUTEROL SULFATE 2 PUFF: 108 INHALANT RESPIRATORY (INHALATION) at 11:34

## 2022-01-25 RX ADMIN — Medication 5 MG: at 21:43

## 2022-01-25 RX ADMIN — SODIUM CHLORIDE, PRESERVATIVE FREE 3 ML: 5 INJECTION INTRAVENOUS at 10:52

## 2022-01-25 RX ADMIN — CETIRIZINE HYDROCHLORIDE 10 MG: 10 TABLET, FILM COATED ORAL at 10:51

## 2022-01-25 RX ADMIN — MONTELUKAST 10 MG: 10 TABLET, FILM COATED ORAL at 21:43

## 2022-01-25 RX ADMIN — SODIUM CHLORIDE, PRESERVATIVE FREE 3 ML: 5 INJECTION INTRAVENOUS at 21:42

## 2022-01-25 RX ADMIN — FAMOTIDINE 40 MG: 20 TABLET ORAL at 12:59

## 2022-01-25 RX ADMIN — LOSARTAN POTASSIUM 100 MG: 25 TABLET, FILM COATED ORAL at 17:15

## 2022-01-25 RX ADMIN — SUCRALFATE 1 G: 1 TABLET ORAL at 17:16

## 2022-01-25 RX ADMIN — ALBUTEROL SULFATE 2 PUFF: 108 INHALANT RESPIRATORY (INHALATION) at 15:56

## 2022-01-25 RX ADMIN — DEXAMETHASONE SODIUM PHOSPHATE 6 MG: 4 INJECTION, SOLUTION INTRAMUSCULAR; INTRAVENOUS at 10:52

## 2022-01-25 RX ADMIN — DOXYCYCLINE 100 MG: 100 INJECTION, POWDER, LYOPHILIZED, FOR SOLUTION INTRAVENOUS at 10:52

## 2022-01-25 RX ADMIN — ZINC SULFATE 220 MG (50 MG) CAPSULE 220 MG: CAPSULE at 12:59

## 2022-01-25 RX ADMIN — BUDESONIDE AND FORMOTEROL FUMARATE DIHYDRATE 2 PUFF: 160; 4.5 AEROSOL RESPIRATORY (INHALATION) at 18:57

## 2022-01-25 RX ADMIN — BENZONATATE 100 MG: 100 CAPSULE ORAL at 21:43

## 2022-01-25 RX ADMIN — ENOXAPARIN SODIUM 40 MG: 40 INJECTION SUBCUTANEOUS at 21:43

## 2022-01-25 RX ADMIN — CITALOPRAM HYDROBROMIDE 40 MG: 20 TABLET ORAL at 10:51

## 2022-01-25 RX ADMIN — OXYCODONE HYDROCHLORIDE AND ACETAMINOPHEN 500 MG: 500 TABLET ORAL at 12:59

## 2022-01-25 RX ADMIN — MAGNESIUM SULFATE HEPTAHYDRATE 2 G: 2 INJECTION, SOLUTION INTRAVENOUS at 14:13

## 2022-01-25 RX ADMIN — REMDESIVIR 200 MG: 100 INJECTION, POWDER, LYOPHILIZED, FOR SOLUTION INTRAVENOUS at 15:20

## 2022-01-25 RX ADMIN — ALBUTEROL SULFATE 2 PUFF: 108 INHALANT RESPIRATORY (INHALATION) at 18:56

## 2022-01-26 LAB
ALBUMIN SERPL-MCNC: 3.4 G/DL (ref 3.5–5.2)
ALP SERPL-CCNC: 64 U/L (ref 39–117)
ALT SERPL W P-5'-P-CCNC: 14 U/L (ref 1–33)
ANION GAP SERPL CALCULATED.3IONS-SCNC: 13 MMOL/L (ref 5–15)
AST SERPL-CCNC: 32 U/L (ref 1–32)
BASOPHILS # BLD AUTO: 0 10*3/MM3 (ref 0–0.2)
BASOPHILS NFR BLD AUTO: 0.4 % (ref 0–1.5)
BILIRUB CONJ SERPL-MCNC: <0.2 MG/DL (ref 0–0.3)
BILIRUB INDIRECT SERPL-MCNC: ABNORMAL MG/DL
BILIRUB SERPL-MCNC: 0.2 MG/DL (ref 0–1.2)
BUN SERPL-MCNC: 17 MG/DL (ref 8–23)
BUN/CREAT SERPL: 25 (ref 7–25)
CALCIUM SPEC-SCNC: 8.8 MG/DL (ref 8.6–10.5)
CHLORIDE SERPL-SCNC: 101 MMOL/L (ref 98–107)
CO2 SERPL-SCNC: 25 MMOL/L (ref 22–29)
CREAT SERPL-MCNC: 0.68 MG/DL (ref 0.57–1)
D DIMER PPP FEU-MCNC: 1.25 MG/L (FEU) (ref 0–0.59)
DEPRECATED RDW RBC AUTO: 44.2 FL (ref 37–54)
EOSINOPHIL # BLD AUTO: 0 10*3/MM3 (ref 0–0.4)
EOSINOPHIL NFR BLD AUTO: 0 % (ref 0.3–6.2)
ERYTHROCYTE [DISTWIDTH] IN BLOOD BY AUTOMATED COUNT: 14.5 % (ref 12.3–15.4)
FIBRINOGEN PPP-MCNC: 416 MG/DL (ref 210–450)
GFR SERPL CREATININE-BSD FRML MDRD: 88 ML/MIN/1.73
GLUCOSE SERPL-MCNC: 110 MG/DL (ref 65–99)
HCT VFR BLD AUTO: 35.4 % (ref 34–46.6)
HGB BLD-MCNC: 12 G/DL (ref 12–15.9)
LYMPHOCYTES # BLD AUTO: 0.5 10*3/MM3 (ref 0.7–3.1)
LYMPHOCYTES NFR BLD AUTO: 12.7 % (ref 19.6–45.3)
MCH RBC QN AUTO: 29.3 PG (ref 26.6–33)
MCHC RBC AUTO-ENTMCNC: 33.8 G/DL (ref 31.5–35.7)
MCV RBC AUTO: 86.9 FL (ref 79–97)
MONOCYTES # BLD AUTO: 0.7 10*3/MM3 (ref 0.1–0.9)
MONOCYTES NFR BLD AUTO: 17 % (ref 5–12)
NEUTROPHILS NFR BLD AUTO: 2.9 10*3/MM3 (ref 1.7–7)
NEUTROPHILS NFR BLD AUTO: 69.9 % (ref 42.7–76)
NRBC BLD AUTO-RTO: 0.1 /100 WBC (ref 0–0.2)
PLATELET # BLD AUTO: 245 10*3/MM3 (ref 140–450)
PMV BLD AUTO: 8.5 FL (ref 6–12)
POTASSIUM SERPL-SCNC: 4.3 MMOL/L (ref 3.5–5.2)
PROT SERPL-MCNC: 6.5 G/DL (ref 6–8.5)
QT INTERVAL: 317 MS
RBC # BLD AUTO: 4.08 10*6/MM3 (ref 3.77–5.28)
SODIUM SERPL-SCNC: 139 MMOL/L (ref 136–145)
WBC NRBC COR # BLD: 4.1 10*3/MM3 (ref 3.4–10.8)

## 2022-01-26 PROCEDURE — 85025 COMPLETE CBC W/AUTO DIFF WBC: CPT | Performed by: NURSE PRACTITIONER

## 2022-01-26 PROCEDURE — 94799 UNLISTED PULMONARY SVC/PX: CPT

## 2022-01-26 PROCEDURE — 85384 FIBRINOGEN ACTIVITY: CPT | Performed by: NURSE PRACTITIONER

## 2022-01-26 PROCEDURE — 25010000002 ENOXAPARIN PER 10 MG: Performed by: INTERNAL MEDICINE

## 2022-01-26 PROCEDURE — XW033E5 INTRODUCTION OF REMDESIVIR ANTI-INFECTIVE INTO PERIPHERAL VEIN, PERCUTANEOUS APPROACH, NEW TECHNOLOGY GROUP 5: ICD-10-PCS | Performed by: INTERNAL MEDICINE

## 2022-01-26 PROCEDURE — 25010000002 REMDESIVIR 100 MG/20ML SOLUTION 1 EACH VIAL: Performed by: NURSE PRACTITIONER

## 2022-01-26 PROCEDURE — 80048 BASIC METABOLIC PNL TOTAL CA: CPT | Performed by: NURSE PRACTITIONER

## 2022-01-26 PROCEDURE — 80076 HEPATIC FUNCTION PANEL: CPT | Performed by: NURSE PRACTITIONER

## 2022-01-26 PROCEDURE — 85379 FIBRIN DEGRADATION QUANT: CPT | Performed by: NURSE PRACTITIONER

## 2022-01-26 RX ADMIN — FAMOTIDINE 40 MG: 20 TABLET ORAL at 09:34

## 2022-01-26 RX ADMIN — ZINC SULFATE 220 MG (50 MG) CAPSULE 220 MG: CAPSULE at 09:34

## 2022-01-26 RX ADMIN — ALBUTEROL SULFATE 2 PUFF: 108 INHALANT RESPIRATORY (INHALATION) at 07:54

## 2022-01-26 RX ADMIN — BUDESONIDE AND FORMOTEROL FUMARATE DIHYDRATE 2 PUFF: 160; 4.5 AEROSOL RESPIRATORY (INHALATION) at 19:23

## 2022-01-26 RX ADMIN — BUDESONIDE AND FORMOTEROL FUMARATE DIHYDRATE 2 PUFF: 160; 4.5 AEROSOL RESPIRATORY (INHALATION) at 07:54

## 2022-01-26 RX ADMIN — GUAIFENESIN 1200 MG: 600 TABLET, EXTENDED RELEASE ORAL at 09:33

## 2022-01-26 RX ADMIN — PANTOPRAZOLE SODIUM 40 MG: 40 TABLET, DELAYED RELEASE ORAL at 09:34

## 2022-01-26 RX ADMIN — ALBUTEROL SULFATE 2 PUFF: 108 INHALANT RESPIRATORY (INHALATION) at 19:20

## 2022-01-26 RX ADMIN — SUCRALFATE 1 G: 1 TABLET ORAL at 17:32

## 2022-01-26 RX ADMIN — DEXAMETHASONE 6 MG: 6 TABLET ORAL at 09:34

## 2022-01-26 RX ADMIN — CETIRIZINE HYDROCHLORIDE 10 MG: 10 TABLET, FILM COATED ORAL at 09:34

## 2022-01-26 RX ADMIN — SUCRALFATE 1 G: 1 TABLET ORAL at 13:56

## 2022-01-26 RX ADMIN — SUCRALFATE 1 G: 1 TABLET ORAL at 20:57

## 2022-01-26 RX ADMIN — GUAIFENESIN 1200 MG: 600 TABLET, EXTENDED RELEASE ORAL at 20:57

## 2022-01-26 RX ADMIN — ENOXAPARIN SODIUM 40 MG: 40 INJECTION SUBCUTANEOUS at 09:33

## 2022-01-26 RX ADMIN — LOSARTAN POTASSIUM 100 MG: 25 TABLET, FILM COATED ORAL at 17:32

## 2022-01-26 RX ADMIN — MONTELUKAST 10 MG: 10 TABLET, FILM COATED ORAL at 20:57

## 2022-01-26 RX ADMIN — ALBUTEROL SULFATE 2 PUFF: 108 INHALANT RESPIRATORY (INHALATION) at 11:30

## 2022-01-26 RX ADMIN — REMDESIVIR 100 MG: 100 INJECTION, POWDER, LYOPHILIZED, FOR SOLUTION INTRAVENOUS at 13:56

## 2022-01-26 RX ADMIN — ENOXAPARIN SODIUM 40 MG: 40 INJECTION SUBCUTANEOUS at 20:57

## 2022-01-26 RX ADMIN — SODIUM CHLORIDE, PRESERVATIVE FREE 3 ML: 5 INJECTION INTRAVENOUS at 20:59

## 2022-01-26 RX ADMIN — SODIUM CHLORIDE, PRESERVATIVE FREE 3 ML: 5 INJECTION INTRAVENOUS at 09:33

## 2022-01-26 RX ADMIN — CITALOPRAM HYDROBROMIDE 40 MG: 20 TABLET ORAL at 09:34

## 2022-01-26 RX ADMIN — BENZONATATE 100 MG: 100 CAPSULE ORAL at 21:06

## 2022-01-26 RX ADMIN — ALBUTEROL SULFATE 2 PUFF: 108 INHALANT RESPIRATORY (INHALATION) at 15:48

## 2022-01-26 RX ADMIN — FOLIC ACID 1000 MCG: 1 TABLET ORAL at 09:34

## 2022-01-26 RX ADMIN — OXYCODONE HYDROCHLORIDE AND ACETAMINOPHEN 500 MG: 500 TABLET ORAL at 09:34

## 2022-01-26 RX ADMIN — SUCRALFATE 1 G: 1 TABLET ORAL at 09:33

## 2022-01-27 LAB
ALBUMIN SERPL-MCNC: 3.2 G/DL (ref 3.5–5.2)
ALP SERPL-CCNC: 62 U/L (ref 39–117)
ALT SERPL W P-5'-P-CCNC: 13 U/L (ref 1–33)
ANION GAP SERPL CALCULATED.3IONS-SCNC: 11 MMOL/L (ref 5–15)
AST SERPL-CCNC: 31 U/L (ref 1–32)
BASOPHILS # BLD AUTO: 0 10*3/MM3 (ref 0–0.2)
BASOPHILS NFR BLD AUTO: 0.2 % (ref 0–1.5)
BILIRUB CONJ SERPL-MCNC: <0.2 MG/DL (ref 0–0.3)
BILIRUB INDIRECT SERPL-MCNC: ABNORMAL MG/DL
BILIRUB SERPL-MCNC: 0.2 MG/DL (ref 0–1.2)
BUN SERPL-MCNC: 20 MG/DL (ref 8–23)
BUN/CREAT SERPL: 31.3 (ref 7–25)
CALCIUM SPEC-SCNC: 8.7 MG/DL (ref 8.6–10.5)
CHLORIDE SERPL-SCNC: 102 MMOL/L (ref 98–107)
CO2 SERPL-SCNC: 25 MMOL/L (ref 22–29)
CREAT SERPL-MCNC: 0.64 MG/DL (ref 0.57–1)
DEPRECATED RDW RBC AUTO: 44.2 FL (ref 37–54)
EOSINOPHIL # BLD AUTO: 0 10*3/MM3 (ref 0–0.4)
EOSINOPHIL NFR BLD AUTO: 0 % (ref 0.3–6.2)
ERYTHROCYTE [DISTWIDTH] IN BLOOD BY AUTOMATED COUNT: 14.3 % (ref 12.3–15.4)
GFR SERPL CREATININE-BSD FRML MDRD: 94 ML/MIN/1.73
GLUCOSE SERPL-MCNC: 115 MG/DL (ref 65–99)
HCT VFR BLD AUTO: 34.3 % (ref 34–46.6)
HGB BLD-MCNC: 11.5 G/DL (ref 12–15.9)
LYMPHOCYTES # BLD AUTO: 0.6 10*3/MM3 (ref 0.7–3.1)
LYMPHOCYTES NFR BLD AUTO: 19.9 % (ref 19.6–45.3)
MCH RBC QN AUTO: 29.3 PG (ref 26.6–33)
MCHC RBC AUTO-ENTMCNC: 33.6 G/DL (ref 31.5–35.7)
MCV RBC AUTO: 87.3 FL (ref 79–97)
MONOCYTES # BLD AUTO: 0.6 10*3/MM3 (ref 0.1–0.9)
MONOCYTES NFR BLD AUTO: 20.3 % (ref 5–12)
NEUTROPHILS NFR BLD AUTO: 1.7 10*3/MM3 (ref 1.7–7)
NEUTROPHILS NFR BLD AUTO: 59.6 % (ref 42.7–76)
NRBC BLD AUTO-RTO: 0.1 /100 WBC (ref 0–0.2)
PLATELET # BLD AUTO: 244 10*3/MM3 (ref 140–450)
PMV BLD AUTO: 8.5 FL (ref 6–12)
POTASSIUM SERPL-SCNC: 4.3 MMOL/L (ref 3.5–5.2)
PROT SERPL-MCNC: 6.2 G/DL (ref 6–8.5)
RBC # BLD AUTO: 3.93 10*6/MM3 (ref 3.77–5.28)
SODIUM SERPL-SCNC: 138 MMOL/L (ref 136–145)
WBC NRBC COR # BLD: 2.8 10*3/MM3 (ref 3.4–10.8)

## 2022-01-27 PROCEDURE — 85025 COMPLETE CBC W/AUTO DIFF WBC: CPT | Performed by: NURSE PRACTITIONER

## 2022-01-27 PROCEDURE — 25010000002 ENOXAPARIN PER 10 MG: Performed by: INTERNAL MEDICINE

## 2022-01-27 PROCEDURE — 80048 BASIC METABOLIC PNL TOTAL CA: CPT | Performed by: NURSE PRACTITIONER

## 2022-01-27 PROCEDURE — 80076 HEPATIC FUNCTION PANEL: CPT | Performed by: NURSE PRACTITIONER

## 2022-01-27 PROCEDURE — 94799 UNLISTED PULMONARY SVC/PX: CPT

## 2022-01-27 PROCEDURE — 25010000002 REMDESIVIR 100 MG/20ML SOLUTION 1 EACH VIAL: Performed by: NURSE PRACTITIONER

## 2022-01-27 RX ADMIN — BUDESONIDE AND FORMOTEROL FUMARATE DIHYDRATE 2 PUFF: 160; 4.5 AEROSOL RESPIRATORY (INHALATION) at 06:40

## 2022-01-27 RX ADMIN — SUCRALFATE 1 G: 1 TABLET ORAL at 07:57

## 2022-01-27 RX ADMIN — SUCRALFATE 1 G: 1 TABLET ORAL at 12:30

## 2022-01-27 RX ADMIN — ALBUTEROL SULFATE 2 PUFF: 108 INHALANT RESPIRATORY (INHALATION) at 19:09

## 2022-01-27 RX ADMIN — SODIUM CHLORIDE, PRESERVATIVE FREE 3 ML: 5 INJECTION INTRAVENOUS at 08:00

## 2022-01-27 RX ADMIN — REMDESIVIR 100 MG: 100 INJECTION, POWDER, LYOPHILIZED, FOR SOLUTION INTRAVENOUS at 14:09

## 2022-01-27 RX ADMIN — FOLIC ACID 1000 MCG: 1 TABLET ORAL at 08:00

## 2022-01-27 RX ADMIN — LOSARTAN POTASSIUM 100 MG: 25 TABLET, FILM COATED ORAL at 17:10

## 2022-01-27 RX ADMIN — SUCRALFATE 1 G: 1 TABLET ORAL at 20:19

## 2022-01-27 RX ADMIN — GUAIFENESIN 1200 MG: 600 TABLET, EXTENDED RELEASE ORAL at 08:00

## 2022-01-27 RX ADMIN — CITALOPRAM HYDROBROMIDE 40 MG: 20 TABLET ORAL at 06:25

## 2022-01-27 RX ADMIN — ALBUTEROL SULFATE 2 PUFF: 108 INHALANT RESPIRATORY (INHALATION) at 15:11

## 2022-01-27 RX ADMIN — SODIUM CHLORIDE, PRESERVATIVE FREE 3 ML: 5 INJECTION INTRAVENOUS at 20:20

## 2022-01-27 RX ADMIN — ENOXAPARIN SODIUM 40 MG: 40 INJECTION SUBCUTANEOUS at 08:00

## 2022-01-27 RX ADMIN — BUDESONIDE AND FORMOTEROL FUMARATE DIHYDRATE 2 PUFF: 160; 4.5 AEROSOL RESPIRATORY (INHALATION) at 19:07

## 2022-01-27 RX ADMIN — SUCRALFATE 1 G: 1 TABLET ORAL at 17:10

## 2022-01-27 RX ADMIN — PANTOPRAZOLE SODIUM 40 MG: 40 TABLET, DELAYED RELEASE ORAL at 06:25

## 2022-01-27 RX ADMIN — FAMOTIDINE 40 MG: 20 TABLET ORAL at 08:00

## 2022-01-27 RX ADMIN — ENOXAPARIN SODIUM 40 MG: 40 INJECTION SUBCUTANEOUS at 20:19

## 2022-01-27 RX ADMIN — OXYCODONE HYDROCHLORIDE AND ACETAMINOPHEN 500 MG: 500 TABLET ORAL at 08:00

## 2022-01-27 RX ADMIN — CETIRIZINE HYDROCHLORIDE 10 MG: 10 TABLET, FILM COATED ORAL at 08:00

## 2022-01-27 RX ADMIN — MONTELUKAST 10 MG: 10 TABLET, FILM COATED ORAL at 20:19

## 2022-01-27 RX ADMIN — GUAIFENESIN 1200 MG: 600 TABLET, EXTENDED RELEASE ORAL at 20:19

## 2022-01-27 RX ADMIN — ALBUTEROL SULFATE 2 PUFF: 108 INHALANT RESPIRATORY (INHALATION) at 11:12

## 2022-01-27 RX ADMIN — ZINC SULFATE 220 MG (50 MG) CAPSULE 220 MG: CAPSULE at 08:00

## 2022-01-27 RX ADMIN — DEXAMETHASONE 6 MG: 6 TABLET ORAL at 07:57

## 2022-01-27 RX ADMIN — ALBUTEROL SULFATE 2 PUFF: 108 INHALANT RESPIRATORY (INHALATION) at 06:40

## 2022-01-28 PROBLEM — J84.9 INTERSTITIAL LUNG DISEASE (HCC): Status: ACTIVE | Noted: 2022-01-28

## 2022-01-28 PROBLEM — F39 MOOD DISORDER (HCC): Status: ACTIVE | Noted: 2022-01-28

## 2022-01-28 PROBLEM — K21.9 GERD WITHOUT ESOPHAGITIS: Status: ACTIVE | Noted: 2022-01-28

## 2022-01-28 LAB
ALBUMIN SERPL-MCNC: 3.2 G/DL (ref 3.5–5.2)
ALP SERPL-CCNC: 68 U/L (ref 39–117)
ALT SERPL W P-5'-P-CCNC: 15 U/L (ref 1–33)
ANION GAP SERPL CALCULATED.3IONS-SCNC: 9 MMOL/L (ref 5–15)
AST SERPL-CCNC: 25 U/L (ref 1–32)
BASOPHILS # BLD AUTO: 0 10*3/MM3 (ref 0–0.2)
BASOPHILS NFR BLD AUTO: 1.2 % (ref 0–1.5)
BILIRUB CONJ SERPL-MCNC: <0.2 MG/DL (ref 0–0.3)
BILIRUB INDIRECT SERPL-MCNC: ABNORMAL MG/DL
BILIRUB SERPL-MCNC: 0.2 MG/DL (ref 0–1.2)
BUN SERPL-MCNC: 18 MG/DL (ref 8–23)
BUN/CREAT SERPL: 27.3 (ref 7–25)
CALCIUM SPEC-SCNC: 8.7 MG/DL (ref 8.6–10.5)
CHLORIDE SERPL-SCNC: 103 MMOL/L (ref 98–107)
CO2 SERPL-SCNC: 24 MMOL/L (ref 22–29)
CREAT SERPL-MCNC: 0.66 MG/DL (ref 0.57–1)
D DIMER PPP FEU-MCNC: 0.81 MG/L (FEU) (ref 0–0.59)
DEPRECATED RDW RBC AUTO: 45.1 FL (ref 37–54)
EOSINOPHIL # BLD AUTO: 0 10*3/MM3 (ref 0–0.4)
EOSINOPHIL NFR BLD AUTO: 0.2 % (ref 0.3–6.2)
ERYTHROCYTE [DISTWIDTH] IN BLOOD BY AUTOMATED COUNT: 14.7 % (ref 12.3–15.4)
FIBRINOGEN PPP-MCNC: 395 MG/DL (ref 210–450)
GFR SERPL CREATININE-BSD FRML MDRD: 91 ML/MIN/1.73
GLUCOSE SERPL-MCNC: 145 MG/DL (ref 65–99)
HCT VFR BLD AUTO: 33.7 % (ref 34–46.6)
HGB BLD-MCNC: 11.3 G/DL (ref 12–15.9)
LYMPHOCYTES # BLD AUTO: 0.5 10*3/MM3 (ref 0.7–3.1)
LYMPHOCYTES NFR BLD AUTO: 14.2 % (ref 19.6–45.3)
MCH RBC QN AUTO: 29.2 PG (ref 26.6–33)
MCHC RBC AUTO-ENTMCNC: 33.6 G/DL (ref 31.5–35.7)
MCV RBC AUTO: 86.7 FL (ref 79–97)
MONOCYTES # BLD AUTO: 0.6 10*3/MM3 (ref 0.1–0.9)
MONOCYTES NFR BLD AUTO: 15.1 % (ref 5–12)
NEUTROPHILS NFR BLD AUTO: 2.7 10*3/MM3 (ref 1.7–7)
NEUTROPHILS NFR BLD AUTO: 69.3 % (ref 42.7–76)
NRBC BLD AUTO-RTO: 0.2 /100 WBC (ref 0–0.2)
PLATELET # BLD AUTO: 230 10*3/MM3 (ref 140–450)
PMV BLD AUTO: 8.3 FL (ref 6–12)
POTASSIUM SERPL-SCNC: 4 MMOL/L (ref 3.5–5.2)
PROT SERPL-MCNC: 5.8 G/DL (ref 6–8.5)
RBC # BLD AUTO: 3.89 10*6/MM3 (ref 3.77–5.28)
SODIUM SERPL-SCNC: 136 MMOL/L (ref 136–145)
WBC NRBC COR # BLD: 3.8 10*3/MM3 (ref 3.4–10.8)

## 2022-01-28 PROCEDURE — 80076 HEPATIC FUNCTION PANEL: CPT | Performed by: NURSE PRACTITIONER

## 2022-01-28 PROCEDURE — 25010000002 ENOXAPARIN PER 10 MG: Performed by: INTERNAL MEDICINE

## 2022-01-28 PROCEDURE — 85025 COMPLETE CBC W/AUTO DIFF WBC: CPT | Performed by: NURSE PRACTITIONER

## 2022-01-28 PROCEDURE — 25010000002 REMDESIVIR 100 MG/20ML SOLUTION 1 EACH VIAL: Performed by: NURSE PRACTITIONER

## 2022-01-28 PROCEDURE — 94799 UNLISTED PULMONARY SVC/PX: CPT

## 2022-01-28 PROCEDURE — 82565 ASSAY OF CREATININE: CPT | Performed by: NURSE PRACTITIONER

## 2022-01-28 PROCEDURE — 85384 FIBRINOGEN ACTIVITY: CPT | Performed by: NURSE PRACTITIONER

## 2022-01-28 PROCEDURE — 85379 FIBRIN DEGRADATION QUANT: CPT | Performed by: NURSE PRACTITIONER

## 2022-01-28 PROCEDURE — 80048 BASIC METABOLIC PNL TOTAL CA: CPT | Performed by: NURSE PRACTITIONER

## 2022-01-28 RX ADMIN — BENZONATATE 100 MG: 100 CAPSULE ORAL at 20:51

## 2022-01-28 RX ADMIN — FOLIC ACID 1000 MCG: 1 TABLET ORAL at 08:42

## 2022-01-28 RX ADMIN — BENZONATATE 100 MG: 100 CAPSULE ORAL at 01:35

## 2022-01-28 RX ADMIN — LOSARTAN POTASSIUM 100 MG: 25 TABLET, FILM COATED ORAL at 16:45

## 2022-01-28 RX ADMIN — SUCRALFATE 1 G: 1 TABLET ORAL at 16:45

## 2022-01-28 RX ADMIN — DEXAMETHASONE 6 MG: 6 TABLET ORAL at 08:42

## 2022-01-28 RX ADMIN — ENOXAPARIN SODIUM 40 MG: 40 INJECTION SUBCUTANEOUS at 20:50

## 2022-01-28 RX ADMIN — ALBUTEROL SULFATE 2 PUFF: 108 INHALANT RESPIRATORY (INHALATION) at 15:20

## 2022-01-28 RX ADMIN — Medication 5 MG: at 20:51

## 2022-01-28 RX ADMIN — ALBUTEROL SULFATE 2 PUFF: 108 INHALANT RESPIRATORY (INHALATION) at 07:40

## 2022-01-28 RX ADMIN — SODIUM CHLORIDE, PRESERVATIVE FREE 3 ML: 5 INJECTION INTRAVENOUS at 20:46

## 2022-01-28 RX ADMIN — ENOXAPARIN SODIUM 40 MG: 40 INJECTION SUBCUTANEOUS at 08:43

## 2022-01-28 RX ADMIN — OXYCODONE HYDROCHLORIDE AND ACETAMINOPHEN 500 MG: 500 TABLET ORAL at 08:43

## 2022-01-28 RX ADMIN — CETIRIZINE HYDROCHLORIDE 10 MG: 10 TABLET, FILM COATED ORAL at 08:42

## 2022-01-28 RX ADMIN — SUCRALFATE 1 G: 1 TABLET ORAL at 12:25

## 2022-01-28 RX ADMIN — GUAIFENESIN 1200 MG: 600 TABLET, EXTENDED RELEASE ORAL at 20:50

## 2022-01-28 RX ADMIN — CITALOPRAM HYDROBROMIDE 40 MG: 20 TABLET ORAL at 06:02

## 2022-01-28 RX ADMIN — SUCRALFATE 1 G: 1 TABLET ORAL at 20:51

## 2022-01-28 RX ADMIN — REMDESIVIR 100 MG: 100 INJECTION, POWDER, LYOPHILIZED, FOR SOLUTION INTRAVENOUS at 14:36

## 2022-01-28 RX ADMIN — MONTELUKAST 10 MG: 10 TABLET, FILM COATED ORAL at 20:51

## 2022-01-28 RX ADMIN — FAMOTIDINE 40 MG: 20 TABLET ORAL at 08:43

## 2022-01-28 RX ADMIN — SUCRALFATE 1 G: 1 TABLET ORAL at 08:43

## 2022-01-28 RX ADMIN — PANTOPRAZOLE SODIUM 40 MG: 40 TABLET, DELAYED RELEASE ORAL at 06:02

## 2022-01-28 RX ADMIN — ALBUTEROL SULFATE 2 PUFF: 108 INHALANT RESPIRATORY (INHALATION) at 11:08

## 2022-01-28 RX ADMIN — ZINC SULFATE 220 MG (50 MG) CAPSULE 220 MG: CAPSULE at 08:42

## 2022-01-28 RX ADMIN — BUDESONIDE AND FORMOTEROL FUMARATE DIHYDRATE 2 PUFF: 160; 4.5 AEROSOL RESPIRATORY (INHALATION) at 19:55

## 2022-01-28 RX ADMIN — BUDESONIDE AND FORMOTEROL FUMARATE DIHYDRATE 2 PUFF: 160; 4.5 AEROSOL RESPIRATORY (INHALATION) at 07:40

## 2022-01-28 RX ADMIN — SODIUM CHLORIDE, PRESERVATIVE FREE 3 ML: 5 INJECTION INTRAVENOUS at 08:43

## 2022-01-28 RX ADMIN — ALBUTEROL SULFATE 2 PUFF: 108 INHALANT RESPIRATORY (INHALATION) at 20:06

## 2022-01-28 RX ADMIN — GUAIFENESIN 1200 MG: 600 TABLET, EXTENDED RELEASE ORAL at 08:42

## 2022-01-29 ENCOUNTER — ANESTHESIA EVENT (OUTPATIENT)
Dept: GASTROENTEROLOGY | Facility: HOSPITAL | Age: 62
End: 2022-01-29

## 2022-01-29 ENCOUNTER — ANESTHESIA (OUTPATIENT)
Dept: GASTROENTEROLOGY | Facility: HOSPITAL | Age: 62
End: 2022-01-29

## 2022-01-29 ENCOUNTER — READMISSION MANAGEMENT (OUTPATIENT)
Dept: CALL CENTER | Facility: HOSPITAL | Age: 62
End: 2022-01-29

## 2022-01-29 VITALS
RESPIRATION RATE: 14 BRPM | OXYGEN SATURATION: 100 % | DIASTOLIC BLOOD PRESSURE: 73 MMHG | HEART RATE: 101 BPM | SYSTOLIC BLOOD PRESSURE: 124 MMHG

## 2022-01-29 VITALS
BODY MASS INDEX: 43.33 KG/M2 | SYSTOLIC BLOOD PRESSURE: 124 MMHG | TEMPERATURE: 98.1 F | WEIGHT: 229.5 LBS | RESPIRATION RATE: 27 BRPM | OXYGEN SATURATION: 98 % | DIASTOLIC BLOOD PRESSURE: 54 MMHG | HEART RATE: 86 BPM | HEIGHT: 61 IN

## 2022-01-29 PROBLEM — D89.831 CYTOKINE RELEASE SYNDROME, GRADE 1: Status: ACTIVE | Noted: 2022-01-29

## 2022-01-29 LAB
ALBUMIN SERPL-MCNC: 3.3 G/DL (ref 3.5–5.2)
ALP SERPL-CCNC: 69 U/L (ref 39–117)
ALT SERPL W P-5'-P-CCNC: 15 U/L (ref 1–33)
AST SERPL-CCNC: 24 U/L (ref 1–32)
B PARAPERT DNA SPEC QL NAA+PROBE: NOT DETECTED
B PERT DNA SPEC QL NAA+PROBE: NOT DETECTED
BACTERIA SPEC AEROBE CULT: NORMAL
BACTERIA SPEC AEROBE CULT: NORMAL
BILIRUB CONJ SERPL-MCNC: <0.2 MG/DL (ref 0–0.3)
BILIRUB INDIRECT SERPL-MCNC: ABNORMAL MG/DL
BILIRUB SERPL-MCNC: 0.2 MG/DL (ref 0–1.2)
C PNEUM DNA NPH QL NAA+NON-PROBE: NOT DETECTED
CREAT SERPL-MCNC: 0.61 MG/DL (ref 0.57–1)
FLUAV SUBTYP SPEC NAA+PROBE: NOT DETECTED
FLUBV RNA ISLT QL NAA+PROBE: NOT DETECTED
GFR SERPL CREATININE-BSD FRML MDRD: 100 ML/MIN/1.73
HADV DNA SPEC NAA+PROBE: NOT DETECTED
HCOV 229E RNA SPEC QL NAA+PROBE: NOT DETECTED
HCOV HKU1 RNA SPEC QL NAA+PROBE: NOT DETECTED
HCOV NL63 RNA SPEC QL NAA+PROBE: NOT DETECTED
HCOV OC43 RNA SPEC QL NAA+PROBE: NOT DETECTED
HMPV RNA NPH QL NAA+NON-PROBE: NOT DETECTED
HPIV1 RNA ISLT QL NAA+PROBE: NOT DETECTED
HPIV2 RNA SPEC QL NAA+PROBE: NOT DETECTED
HPIV3 RNA NPH QL NAA+PROBE: NOT DETECTED
HPIV4 P GENE NPH QL NAA+PROBE: NOT DETECTED
M PNEUMO IGG SER IA-ACNC: NOT DETECTED
PROT SERPL-MCNC: 6 G/DL (ref 6–8.5)
RHINOVIRUS RNA SPEC NAA+PROBE: NOT DETECTED
RSV RNA NPH QL NAA+NON-PROBE: NOT DETECTED
SARS-COV-2 RNA NPH QL NAA+NON-PROBE: DETECTED

## 2022-01-29 PROCEDURE — 0202U NFCT DS 22 TRGT SARS-COV-2: CPT | Performed by: INTERNAL MEDICINE

## 2022-01-29 PROCEDURE — 94761 N-INVAS EAR/PLS OXIMETRY MLT: CPT

## 2022-01-29 PROCEDURE — 0B9K8ZX DRAINAGE OF RIGHT LUNG, VIA NATURAL OR ARTIFICIAL OPENING ENDOSCOPIC, DIAGNOSTIC: ICD-10-PCS | Performed by: INTERNAL MEDICINE

## 2022-01-29 PROCEDURE — 87798 DETECT AGENT NOS DNA AMP: CPT | Performed by: INTERNAL MEDICINE

## 2022-01-29 PROCEDURE — 87205 SMEAR GRAM STAIN: CPT | Performed by: INTERNAL MEDICINE

## 2022-01-29 PROCEDURE — 87070 CULTURE OTHR SPECIMN AEROBIC: CPT | Performed by: INTERNAL MEDICINE

## 2022-01-29 PROCEDURE — 94799 UNLISTED PULMONARY SVC/PX: CPT

## 2022-01-29 PROCEDURE — 87305 ASPERGILLUS AG IA: CPT | Performed by: INTERNAL MEDICINE

## 2022-01-29 PROCEDURE — 25010000002 PROPOFOL 10 MG/ML EMULSION

## 2022-01-29 PROCEDURE — 87102 FUNGUS ISOLATION CULTURE: CPT | Performed by: INTERNAL MEDICINE

## 2022-01-29 PROCEDURE — 87206 SMEAR FLUORESCENT/ACID STAI: CPT | Performed by: INTERNAL MEDICINE

## 2022-01-29 PROCEDURE — 94618 PULMONARY STRESS TESTING: CPT

## 2022-01-29 PROCEDURE — 87116 MYCOBACTERIA CULTURE: CPT | Performed by: INTERNAL MEDICINE

## 2022-01-29 PROCEDURE — 88108 CYTOPATH CONCENTRATE TECH: CPT | Performed by: INTERNAL MEDICINE

## 2022-01-29 PROCEDURE — 25010000002 REMDESIVIR 100 MG/20ML SOLUTION 1 EACH VIAL: Performed by: NURSE PRACTITIONER

## 2022-01-29 PROCEDURE — 25010000002 ENOXAPARIN PER 10 MG: Performed by: INTERNAL MEDICINE

## 2022-01-29 PROCEDURE — 87385 HISTOPLASMA CAPSUL AG IA: CPT | Performed by: INTERNAL MEDICINE

## 2022-01-29 RX ORDER — CYCLOBENZAPRINE HCL 10 MG
10 TABLET ORAL NIGHTLY PRN
Qty: 20 TABLET | Refills: 0 | Status: SHIPPED | OUTPATIENT
Start: 2022-01-29 | End: 2022-08-28

## 2022-01-29 RX ORDER — DEXAMETHASONE SODIUM PHOSPHATE 4 MG/ML
8 INJECTION, SOLUTION INTRA-ARTICULAR; INTRALESIONAL; INTRAMUSCULAR; INTRAVENOUS; SOFT TISSUE ONCE AS NEEDED
Status: CANCELLED | OUTPATIENT
Start: 2022-01-29

## 2022-01-29 RX ORDER — SODIUM CHLORIDE 9 MG/ML
INJECTION, SOLUTION INTRAVENOUS CONTINUOUS PRN
Status: DISCONTINUED | OUTPATIENT
Start: 2022-01-29 | End: 2022-01-29 | Stop reason: SURG

## 2022-01-29 RX ORDER — ONDANSETRON 2 MG/ML
4 INJECTION INTRAMUSCULAR; INTRAVENOUS ONCE AS NEEDED
Status: CANCELLED | OUTPATIENT
Start: 2022-01-29

## 2022-01-29 RX ORDER — FLUMAZENIL 0.1 MG/ML
0.2 INJECTION INTRAVENOUS AS NEEDED
Status: CANCELLED | OUTPATIENT
Start: 2022-01-29

## 2022-01-29 RX ORDER — LIDOCAINE HYDROCHLORIDE 10 MG/ML
INJECTION, SOLUTION EPIDURAL; INFILTRATION; INTRACAUDAL; PERINEURAL AS NEEDED
Status: DISCONTINUED | OUTPATIENT
Start: 2022-01-29 | End: 2022-01-29 | Stop reason: SURG

## 2022-01-29 RX ORDER — ASCORBIC ACID 500 MG
500 TABLET ORAL DAILY
Qty: 30 TABLET | Refills: 0 | Status: SHIPPED | OUTPATIENT
Start: 2022-01-30

## 2022-01-29 RX ORDER — LIDOCAINE HYDROCHLORIDE 20 MG/ML
INJECTION, SOLUTION EPIDURAL; INFILTRATION; INTRACAUDAL; PERINEURAL
Status: DISCONTINUED
Start: 2022-01-29 | End: 2022-01-30 | Stop reason: HOSPADM

## 2022-01-29 RX ORDER — SUCRALFATE 1 G/1
1 TABLET ORAL
Qty: 120 TABLET | Refills: 0 | Status: SHIPPED | OUTPATIENT
Start: 2022-01-29 | End: 2022-08-28

## 2022-01-29 RX ORDER — PROMETHAZINE HYDROCHLORIDE 25 MG/1
25 TABLET ORAL ONCE AS NEEDED
Status: CANCELLED | OUTPATIENT
Start: 2022-01-29

## 2022-01-29 RX ORDER — LIDOCAINE 50 MG/G
OINTMENT TOPICAL
Status: DISCONTINUED
Start: 2022-01-29 | End: 2022-01-30 | Stop reason: HOSPADM

## 2022-01-29 RX ORDER — LIDOCAINE HYDROCHLORIDE 20 MG/ML
INJECTION, SOLUTION INFILTRATION; PERINEURAL AS NEEDED
Status: DISCONTINUED | OUTPATIENT
Start: 2022-01-29 | End: 2022-01-29 | Stop reason: HOSPADM

## 2022-01-29 RX ORDER — DEXAMETHASONE 2 MG/1
2 TABLET ORAL
Qty: 11 TABLET | Refills: 0 | Status: SHIPPED | OUTPATIENT
Start: 2022-01-29 | End: 2022-04-10

## 2022-01-29 RX ORDER — BENZONATATE 100 MG/1
100 CAPSULE ORAL 3 TIMES DAILY PRN
Qty: 20 CAPSULE | Refills: 0 | Status: SHIPPED | OUTPATIENT
Start: 2022-01-29 | End: 2022-04-10

## 2022-01-29 RX ORDER — ZINC SULFATE 50(220)MG
220 CAPSULE ORAL DAILY
Qty: 30 CAPSULE | Refills: 0 | Status: SHIPPED | OUTPATIENT
Start: 2022-01-30

## 2022-01-29 RX ORDER — PROPOFOL 10 MG/ML
VIAL (ML) INTRAVENOUS AS NEEDED
Status: DISCONTINUED | OUTPATIENT
Start: 2022-01-29 | End: 2022-01-29 | Stop reason: SURG

## 2022-01-29 RX ORDER — NALOXONE HCL 0.4 MG/ML
0.4 VIAL (ML) INJECTION AS NEEDED
Status: CANCELLED | OUTPATIENT
Start: 2022-01-29

## 2022-01-29 RX ORDER — SODIUM CHLORIDE 9 MG/ML
100 INJECTION, SOLUTION INTRAVENOUS CONTINUOUS
Status: CANCELLED | OUTPATIENT
Start: 2022-01-29

## 2022-01-29 RX ORDER — PROMETHAZINE HYDROCHLORIDE 25 MG/1
25 SUPPOSITORY RECTAL ONCE AS NEEDED
Status: CANCELLED | OUTPATIENT
Start: 2022-01-29

## 2022-01-29 RX ORDER — GUAIFENESIN 600 MG/1
1200 TABLET, EXTENDED RELEASE ORAL EVERY 12 HOURS SCHEDULED
Qty: 60 TABLET | Refills: 0 | Status: SHIPPED | OUTPATIENT
Start: 2022-01-29 | End: 2022-04-10

## 2022-01-29 RX ORDER — ALBUTEROL SULFATE 90 UG/1
2 AEROSOL, METERED RESPIRATORY (INHALATION)
Qty: 1 G | Refills: 0 | Status: SHIPPED | OUTPATIENT
Start: 2022-01-29

## 2022-01-29 RX ADMIN — DEXAMETHASONE 6 MG: 6 TABLET ORAL at 09:27

## 2022-01-29 RX ADMIN — REMDESIVIR 100 MG: 100 INJECTION, POWDER, LYOPHILIZED, FOR SOLUTION INTRAVENOUS at 16:54

## 2022-01-29 RX ADMIN — ZINC SULFATE 220 MG (50 MG) CAPSULE 220 MG: CAPSULE at 09:28

## 2022-01-29 RX ADMIN — GUAIFENESIN 1200 MG: 600 TABLET, EXTENDED RELEASE ORAL at 09:28

## 2022-01-29 RX ADMIN — LOSARTAN POTASSIUM 100 MG: 25 TABLET, FILM COATED ORAL at 16:54

## 2022-01-29 RX ADMIN — SUCRALFATE 1 G: 1 TABLET ORAL at 14:35

## 2022-01-29 RX ADMIN — SUCRALFATE 1 G: 1 TABLET ORAL at 16:54

## 2022-01-29 RX ADMIN — FAMOTIDINE 40 MG: 20 TABLET ORAL at 09:27

## 2022-01-29 RX ADMIN — SODIUM CHLORIDE, PRESERVATIVE FREE 3 ML: 5 INJECTION INTRAVENOUS at 09:27

## 2022-01-29 RX ADMIN — SODIUM CHLORIDE: 9 INJECTION, SOLUTION INTRAVENOUS at 12:22

## 2022-01-29 RX ADMIN — BUDESONIDE AND FORMOTEROL FUMARATE DIHYDRATE 2 PUFF: 160; 4.5 AEROSOL RESPIRATORY (INHALATION) at 06:53

## 2022-01-29 RX ADMIN — PANTOPRAZOLE SODIUM 40 MG: 40 TABLET, DELAYED RELEASE ORAL at 06:27

## 2022-01-29 RX ADMIN — SUCRALFATE 1 G: 1 TABLET ORAL at 09:28

## 2022-01-29 RX ADMIN — LIDOCAINE HYDROCHLORIDE 100 MG: 10 INJECTION, SOLUTION EPIDURAL; INFILTRATION; INTRACAUDAL; PERINEURAL at 12:26

## 2022-01-29 RX ADMIN — PROPOFOL 80 MG: 10 INJECTION, EMULSION INTRAVENOUS at 12:25

## 2022-01-29 RX ADMIN — FOLIC ACID 1000 MCG: 1 TABLET ORAL at 09:28

## 2022-01-29 RX ADMIN — CETIRIZINE HYDROCHLORIDE 10 MG: 10 TABLET, FILM COATED ORAL at 09:27

## 2022-01-29 RX ADMIN — ALBUTEROL SULFATE 2 PUFF: 108 INHALANT RESPIRATORY (INHALATION) at 10:45

## 2022-01-29 RX ADMIN — OXYCODONE HYDROCHLORIDE AND ACETAMINOPHEN 500 MG: 500 TABLET ORAL at 09:28

## 2022-01-29 RX ADMIN — ENOXAPARIN SODIUM 40 MG: 40 INJECTION SUBCUTANEOUS at 09:27

## 2022-01-29 RX ADMIN — ALBUTEROL SULFATE 2 PUFF: 108 INHALANT RESPIRATORY (INHALATION) at 06:47

## 2022-01-29 RX ADMIN — CITALOPRAM HYDROBROMIDE 40 MG: 20 TABLET ORAL at 09:27

## 2022-01-29 RX ADMIN — ALBUTEROL SULFATE 2 PUFF: 108 INHALANT RESPIRATORY (INHALATION) at 15:49

## 2022-01-29 NOTE — ANESTHESIA PREPROCEDURE EVALUATION
Anesthesia Evaluation     Patient summary reviewed and Nursing notes reviewed   NPO Solid Status: > 8 hours  NPO Liquid Status: > 8 hours           Airway   Mallampati: II  TM distance: >3 FB  Neck ROM: full  No difficulty expected  Dental - normal exam     Pulmonary    (+) pneumonia , COPD, asthma,shortness of breath,   Cardiovascular     (+) hypertension, hyperlipidemia,       Neuro/Psych  (+) numbness, psychiatric history,     GI/Hepatic/Renal/Endo    (+) morbid obesity, GERD,      Musculoskeletal     Abdominal    Substance History      OB/GYN          Other   arthritis,      ROS/Med Hx Other: covid pna                  Anesthesia Plan    ASA 3     general     intravenous induction     Anesthetic plan, all risks, benefits, and alternatives have been provided, discussed and informed consent has been obtained with: patient.    Plan discussed with CRNA and CAA.        CODE STATUS:    Level Of Support Discussed With: Patient  Code Status (Patient has no pulse and is not breathing): CPR (Attempt to Resuscitate)  Medical Interventions (Patient has pulse or is breathing): Full Support

## 2022-01-29 NOTE — ANESTHESIA POSTPROCEDURE EVALUATION
Patient: Mariel Parker    Procedure Summary     Date: 01/29/22 Room / Location: [facility] ENDOSCOPY 3 / [facility] ENDOSCOPY    Anesthesia Start: 1158 Anesthesia Stop: 1231    Procedure: BRONCHOSCOPY (N/A Bronchus) Diagnosis:     Surgeons: Dana Garcia MD Provider: Vamshi Caballero MD    Anesthesia Type: general ASA Status: 3          Anesthesia Type: general    Vitals  Vitals Value Taken Time   /60 01/29/22 1253   Temp     Pulse 94 01/29/22 1254   Resp 14 01/29/22 1230   SpO2 96 % 01/29/22 1254   Vitals shown include unvalidated device data.        Post Anesthesia Care and Evaluation    Patient location during evaluation: PACU  Patient participation: complete - patient participated  Level of consciousness: awake  Pain scale: See nurse's notes for pain score.  Pain management: adequate  Airway patency: patent  Anesthetic complications: No anesthetic complications  PONV Status: none  Cardiovascular status: acceptable  Respiratory status: acceptable  Hydration status: acceptable    Comments: Patient seen and examined postoperatively; vital signs stable; SpO2 greater than or equal to 90%; cardiopulmonary status stable; nausea/vomiting adequately controlled; pain adequately controlled; no apparent anesthesia complications; patient discharged from anesthesia care when discharge criteria were met

## 2022-01-30 ENCOUNTER — READMISSION MANAGEMENT (OUTPATIENT)
Dept: CALL CENTER | Facility: HOSPITAL | Age: 62
End: 2022-01-30

## 2022-01-30 NOTE — OUTREACH NOTE
Prep Survey      Responses   [facility] patient discharged from? [facility]   Is LACE score < 7 ? No   Emergency Room discharge w/ pulse ox? No   Eligibility Readm Mgmt   Discharge diagnosis COVID pneumonia,  Interstitial lung disease    Does the patient have one of the following disease processes/diagnoses(primary or secondary)? COVID-19   Does the patient have Home health ordered? No   Is there a DME ordered? Yes   What DME was ordered? Home O2 - [facility]   Comments regarding appointments See AVS   Medication alerts for this patient Taper Decadron   Prep survey completed? Yes          Shantel Benoit RN

## 2022-01-30 NOTE — OUTREACH NOTE
COVID-19 Week 1 Survey      Responses   [facility] patient discharged from? [facility]   Does the patient have one of the following disease processes/diagnoses(primary or secondary)? COVID-19   COVID-19 underlying condition? None   Call Number Call 1   Week 1 Call successful? Yes   Call start time 1055   Call end time 1103   Discharge diagnosis COVID pneumonia,  Interstitial lung disease    Meds reviewed with patient/caregiver? Yes   Is the patient having any side effects they believe may be caused by any medication additions or changes? No   Does the patient have all medications ordered at discharge? No   What is keeping the patient from filling the prescriptions? --  [ picking them up today]   Nursing Interventions No intervention needed   Is the patient taking all medications as directed (includes completed medication regime)? Yes   Does the patient have a primary care provider?  Yes   Does the patient have an appointment with their PCP or specialist within 7 days of discharge? No   What is preventing the patient from scheduling follow up appointments within 7 days of discharge? Haven't had time   Nursing Interventions Educated patient on importance of making appointment   Has the patient kept scheduled appointments due by today? N/A   Comments Will call and try to go after being quarantined   Has home health visited the patient within 72 hours of discharge? N/A   What DME was ordered? Home O2 - [facility]   Has all DME been delivered? Yes   DME comments on 2L oxygen, delivered yesterday   Psychosocial issues? No   Did the patient receive a copy of their discharge instructions? Yes   Did the patient receive a copy of COVID-19 specific instructions? Yes   Nursing interventions Reviewed instructions with patient   What is the patient's perception of their health status since discharge? Improving   Does the patient have any of the following symptoms? Shortness of breath   Nursing Interventions Nurse provided  patient education   Pulse Ox monitoring Intermittent   Pulse Ox device source Hospital   O2 Sat comments 93% on 2l   O2 Sat: education provided Sat levels,  When to seek care   O2 Sat education comments Keep sats above 90% and come to ED if dropping   Is the patient/caregiver able to teach back steps to recovery at home? Rest and rebuild strength, gradually increase activity,  Practice good oral hygiene,  Eat a well-balance diet   If the patient is a current smoker, are they able to teach back resources for cessation? Not a smoker   Is the patient/caregiver able to teach back the hierarchy of who to call/visit for symptoms/problems? PCP, Specialist, Home health nurse, Urgent Care, ED, 911 Yes   COVID-19 call completed? Yes   Wrap up additional comments Pt very pleased with care. Enc good pulmonary toilet and to dispose of chapsticks and toothbrush.           Sarahi Yoder RN

## 2022-01-31 ENCOUNTER — READMISSION MANAGEMENT (OUTPATIENT)
Dept: CALL CENTER | Facility: HOSPITAL | Age: 62
End: 2022-01-31

## 2022-01-31 NOTE — OUTREACH NOTE
COVID-19 Week 1 Survey      Responses   [facility] patient discharged from? [facility]   Does the patient have one of the following disease processes/diagnoses(primary or secondary)? COVID-19   COVID-19 underlying condition? None   Call Number Call 2   Week 1 Call successful? Yes   Call start time 1143   Call end time 1148   Discharge diagnosis COVID pneumonia,  Interstitial lung disease    Person spoke with today (if not patient) and relationship Jemal, spouse   Meds reviewed with patient/caregiver? Yes   Is the patient having any side effects they believe may be caused by any medication additions or changes? No   Does the patient have all medications ordered at discharge? Yes   Is the patient taking all medications as directed (includes completed medication regime)? Yes   Does the patient have a primary care provider?  Yes   Does the patient have an appointment with their PCP or specialist within 7 days of discharge? No   What is preventing the patient from scheduling follow up appointments within 7 days of discharge? Haven't had time   Nursing Interventions Advised patient to make appointment   Has the patient kept scheduled appointments due by today? N/A   Has home health visited the patient within 72 hours of discharge? N/A   Psychosocial issues? No   Did the patient receive a copy of their discharge instructions? Yes   Did the patient receive a copy of COVID-19 specific instructions? Yes   Nursing interventions Reviewed instructions with patient   What is the patient's perception of their health status since discharge? Improving   Does the patient have any of the following symptoms? Shortness of breath   Nursing Interventions Nurse provided patient education   Pulse Ox monitoring Intermittent   Pulse Ox device source Patient   O2 Sat comments 93% and higher on RA and O2   O2 Sat: education provided Sat levels,  Monitoring frequency,  When to seek care   Is the patient/caregiver able to teach back steps to  recovery at home? Set small, achievable goals for return to baseline health,  Rest and rebuild strength, gradually increase activity,  Eat a well-balance diet   Is the patient/caregiver able to teach back the hierarchy of who to call/visit for symptoms/problems? PCP, Specialist, Home health nurse, Urgent Care, ED, 911 Yes   COVID-19 call completed? Yes          Alma Nicholson RN

## 2022-02-01 ENCOUNTER — READMISSION MANAGEMENT (OUTPATIENT)
Dept: CALL CENTER | Facility: HOSPITAL | Age: 62
End: 2022-02-01

## 2022-02-01 LAB
BACTERIA SPEC RESP CULT: NORMAL
GRAM STN SPEC: NORMAL
LAB AP CASE REPORT: NORMAL
PATH REPORT.FINAL DX SPEC: NORMAL
PATH REPORT.GROSS SPEC: NORMAL

## 2022-02-01 NOTE — OUTREACH NOTE
COVID-19 Week 1 Survey      Responses   [facility] patient discharged from? [facility]   Does the patient have one of the following disease processes/diagnoses(primary or secondary)? COVID-19   COVID-19 underlying condition? None   Call Number Call 3   Week 1 Call successful? Yes   Call start time 1249   Call end time 1259   Discharge diagnosis COVID pneumonia,  Interstitial lung disease    Meds reviewed with patient/caregiver? Yes   Is the patient having any side effects they believe may be caused by any medication additions or changes? No   Does the patient have all medications ordered at discharge? Yes   Is the patient taking all medications as directed (includes completed medication regime)? Yes   Does the patient have a primary care provider?  Yes   Does the patient have an appointment with their PCP or specialist within 7 days of discharge? No   What is preventing the patient from scheduling follow up appointments within 7 days of discharge? Haven't had time   Nursing Interventions Advised patient to make appointment   Has the patient kept scheduled appointments due by today? N/A   Comments Will call and try to go after being quarantined   Has home health visited the patient within 72 hours of discharge? N/A   What DME was ordered? Home O2 - [employer]   Has all DME been delivered? Yes   DME comments on 2L oxygen, Patient takes 02 off some.    Psychosocial issues? No   Did the patient receive a copy of their discharge instructions? Yes   Did the patient receive a copy of COVID-19 specific instructions? Yes   Nursing interventions Reviewed instructions with patient   What is the patient's perception of their health status since discharge? Improving   Does the patient have any of the following symptoms? Shortness of breath   Nursing Interventions Nurse provided patient education   Pulse Ox monitoring Intermittent   Pulse Ox device source Patient   O2 Sat comments 93% and higher on RA and O2. Patient is trying to  wean off 02 some, but sometimes she has to put back on due to sats dropping below 90   O2 Sat: education provided Sat levels,  Monitoring frequency,  When to seek care   O2 Sat education comments Keep sats above 90% and come to ED if dropping   Is the patient/caregiver able to teach back steps to recovery at home? Set small, achievable goals for return to baseline health,  Rest and rebuild strength, gradually increase activity,  Eat a well-balance diet   If the patient is a current smoker, are they able to teach back resources for cessation? Not a smoker   Is the patient/caregiver able to teach back the hierarchy of who to call/visit for symptoms/problems? PCP, Specialist, Home health nurse, Urgent Care, ED, 911 Yes   COVID-19 call completed? Yes   Wrap up additional comments Patient very pleased with care.           Fox Moreland RN

## 2022-02-02 LAB
P JIROVECII DNA L RESP QL NAA+NON-PROBE: NEGATIVE
REF LAB TEST METHOD: NORMAL
REF LAB TEST METHOD: NORMAL

## 2022-02-03 LAB
REF LAB TEST METHOD: NORMAL
REF LAB TEST METHOD: NORMAL

## 2022-02-04 ENCOUNTER — READMISSION MANAGEMENT (OUTPATIENT)
Dept: CALL CENTER | Facility: HOSPITAL | Age: 62
End: 2022-02-04

## 2022-02-04 NOTE — OUTREACH NOTE
COVID-19 Week 2 Survey      Responses   [facility] patient discharged from? [facility]   Does the patient have one of the following disease processes/diagnoses(primary or secondary)? COVID-19   COVID-19 underlying condition? None   Call Number Call 1   COVID-19 Week 2: Call 1 attempt successful? Yes   Call start time 1143   Call end time 1202   Discharge diagnosis COVID pneumonia,  Interstitial lung disease    Person spoke with today (if not patient) and relationship patient   Meds reviewed with patient/caregiver? Yes   Is the patient having any side effects they believe may be caused by any medication additions or changes? No   Is the patient taking all medications as directed (includes completed medication regime)? Yes   Does the patient have a primary care provider?  Yes   Comments regarding PCP 2/14/22 with ARACELI Ferris   Does the patient have an appointment with their PCP or specialist within 7 days of discharge? Greater than 7 days   Nursing Interventions Verified appointment date/time/provider   Has the patient kept scheduled appointments due by today? N/A   Psychosocial issues? No   What is the patient's perception of their health status since discharge? Improving   Does the patient have any of the following symptoms? Shortness of breath   Nursing Interventions Nurse provided patient education   Pulse Ox monitoring Intermittent   Pulse Ox device source Patient   O2 Sat comments 92% RA   Is the patient/caregiver able to teach back steps to recovery at home? Rest and rebuild strength, gradually increase activity,  Eat a well-balance diet   COVID-19 call completed? Yes   Wrap up additional comments Pt states she is doing pretty good,  reports SOB, brain fog, and weakness. RN call PCP office to make pt's fu appt, and pt was called back to verify PCP hospital fu appt on 2/14/22. Questions/concerns addressed.          Barbie Girard RN

## 2022-02-11 ENCOUNTER — READMISSION MANAGEMENT (OUTPATIENT)
Dept: CALL CENTER | Facility: HOSPITAL | Age: 62
End: 2022-02-11

## 2022-02-11 NOTE — OUTREACH NOTE
COVID-19 Week 3 Survey      Responses   [facility] patient discharged from? [facility]   Does the patient have one of the following disease processes/diagnoses(primary or secondary)? COVID-19   COVID-19 underlying condition? None   Call Number Call 1   COVID-19 Week 3: Call 1 attempt successful? Yes   Call start time 1305   Call end time 1329   Discharge diagnosis COVID pneumonia,  Interstitial lung disease    Person spoke with today (if not patient) and relationship patient   Meds reviewed with patient/caregiver? Yes   Is the patient having any side effects they believe may be caused by any medication additions or changes? No   Does the patient have all medications ordered at discharge? Yes   Is the patient taking all medications as directed (includes completed medication regime)? Yes   Does the patient have a primary care provider?  Yes   Comments regarding PCP 2/14/22 with ARACELI Ferris   Does the patient have an appointment with their PCP or specialist within 7 days of discharge? Greater than 7 days   What is preventing the patient from scheduling follow up appointments within 7 days of discharge? Haven't had time   Nursing Interventions Verified appointment date/time/provider   Has the patient kept scheduled appointments due by today? N/A   Psychosocial issues? No   What is the patient's perception of their health status since discharge? Improving   Does the patient have any of the following symptoms? Shortness of breath,  Cough   Nursing Interventions Nurse provided patient education   Pulse Ox monitoring Intermittent   Pulse Ox device source Patient   O2 Sat comments 94% RA   Is the patient/caregiver able to teach back steps to recovery at home? Rest and rebuild strength, gradually increase activity,  Eat a well-balance diet   COVID-19 call completed? Yes   Wrap up additional comments Pt states she is doing better. Pt shares O2 sats has been staying around 94% RA,  pt adds if she does too much she does get  SOB, and has to put oxygen on for a while. Pt encouraged to try doing deep breathing exercises without oxygen to see if she can recover O2 sats into the 90s, and to report O2 sats to PCP at visit. Pt states she will try to do that, and report O2 sats to PCP at visit. Pt is still wearing cont oxygen when sleeping. Questions/concerns addressed.          Barbie Girard RN   infant tolerated car seat test, maintain oxygen sat and color pink, HR WNL during and after test

## 2022-02-26 LAB — FUNGUS WND CULT: NORMAL

## 2022-03-12 LAB
MYCOBACTERIUM SPEC CULT: NORMAL
NIGHT BLUE STAIN TISS: NORMAL

## 2022-06-10 ENCOUNTER — TRANSCRIBE ORDERS (OUTPATIENT)
Dept: ADMINISTRATIVE | Facility: HOSPITAL | Age: 62
End: 2022-06-10

## 2022-06-10 DIAGNOSIS — R06.02 SHORTNESS OF BREATH: Primary | ICD-10-CM

## 2022-06-10 DIAGNOSIS — I27.20 PHT (PULMONARY HYPERTENSION): ICD-10-CM

## 2022-07-04 PROCEDURE — U0004 COV-19 TEST NON-CDC HGH THRU: HCPCS | Performed by: FAMILY MEDICINE

## 2022-07-08 ENCOUNTER — APPOINTMENT (OUTPATIENT)
Dept: CARDIOLOGY | Facility: HOSPITAL | Age: 62
End: 2022-07-08

## 2022-07-28 ENCOUNTER — HOSPITAL ENCOUNTER (OUTPATIENT)
Dept: CARDIOLOGY | Facility: HOSPITAL | Age: 62
Discharge: HOME | End: 2022-07-28
Admitting: INTERNAL MEDICINE

## 2022-07-28 VITALS
DIASTOLIC BLOOD PRESSURE: 90 MMHG | HEIGHT: 61 IN | WEIGHT: 230 LBS | BODY MASS INDEX: 43.43 KG/M2 | SYSTOLIC BLOOD PRESSURE: 166 MMHG

## 2022-07-28 DIAGNOSIS — I27.20 PHT (PULMONARY HYPERTENSION): ICD-10-CM

## 2022-07-28 LAB
BH CV ECHO MEAS - ACS: 1.85 CM
BH CV ECHO MEAS - AO MAX PG: 9.1 MMHG
BH CV ECHO MEAS - AO MEAN PG: 4.9 MMHG
BH CV ECHO MEAS - AO ROOT DIAM: 2.5 CM
BH CV ECHO MEAS - AO V2 MAX: 150.4 CM/SEC
BH CV ECHO MEAS - AO V2 VTI: 34.5 CM
BH CV ECHO MEAS - AVA(I,D): 1.76 CM2
BH CV ECHO MEAS - EDV(CUBED): 93.3 ML
BH CV ECHO MEAS - EDV(MOD-SP4): 137.1 ML
BH CV ECHO MEAS - EF(MOD-BP): 65 %
BH CV ECHO MEAS - EF(MOD-SP4): 65.9 %
BH CV ECHO MEAS - ESV(CUBED): 27.8 ML
BH CV ECHO MEAS - ESV(MOD-SP4): 46.8 ML
BH CV ECHO MEAS - FS: 33.2 %
BH CV ECHO MEAS - IVS/LVPW: 0.89 CM
BH CV ECHO MEAS - IVSD: 1.04 CM
BH CV ECHO MEAS - LA A2CS (ATRIAL LENGTH): 4.3 CM
BH CV ECHO MEAS - LV MASS(C)D: 177.6 GRAMS
BH CV ECHO MEAS - LV MAX PG: 4.5 MMHG
BH CV ECHO MEAS - LV MEAN PG: 3 MMHG
BH CV ECHO MEAS - LV V1 MAX: 106.5 CM/SEC
BH CV ECHO MEAS - LV V1 VTI: 25.7 CM
BH CV ECHO MEAS - LVIDD: 4.5 CM
BH CV ECHO MEAS - LVIDS: 3 CM
BH CV ECHO MEAS - LVOT AREA: 2.36 CM2
BH CV ECHO MEAS - LVOT DIAM: 1.73 CM
BH CV ECHO MEAS - LVPWD: 1.17 CM
BH CV ECHO MEAS - MV A MAX VEL: 71.5 CM/SEC
BH CV ECHO MEAS - MV DEC SLOPE: 441.1 CM/SEC2
BH CV ECHO MEAS - MV DEC TIME: 0.21 MSEC
BH CV ECHO MEAS - MV E MAX VEL: 91.5 CM/SEC
BH CV ECHO MEAS - MV E/A: 1.28
BH CV ECHO MEAS - MV MAX PG: 3.8 MMHG
BH CV ECHO MEAS - MV MEAN PG: 1.82 MMHG
BH CV ECHO MEAS - MV V2 VTI: 24.5 CM
BH CV ECHO MEAS - MVA(VTI): 2.48 CM2
BH CV ECHO MEAS - PA ACC TIME: 0.06 SEC
BH CV ECHO MEAS - PA PR(ACCEL): 54.1 MMHG
BH CV ECHO MEAS - PA V2 MAX: 114.6 CM/SEC
BH CV ECHO MEAS - PULM A REVS DUR: 0.1 SEC
BH CV ECHO MEAS - PULM A REVS VEL: 24 CM/SEC
BH CV ECHO MEAS - PULM DIAS VEL: 47.4 CM/SEC
BH CV ECHO MEAS - PULM S/D: 1.12
BH CV ECHO MEAS - PULM SYS VEL: 53.2 CM/SEC
BH CV ECHO MEAS - RAP SYSTOLE: 3 MMHG
BH CV ECHO MEAS - RV MAX PG: 2.7 MMHG
BH CV ECHO MEAS - RV V1 MAX: 82.2 CM/SEC
BH CV ECHO MEAS - RV V1 VTI: 20.5 CM
BH CV ECHO MEAS - RVDD: 3 CM
BH CV ECHO MEAS - RVSP: 25.8 MMHG
BH CV ECHO MEAS - SV(LVOT): 60.6 ML
BH CV ECHO MEAS - SV(MOD-SP4): 90.3 ML
BH CV ECHO MEAS - TR MAX PG: 22.8 MMHG
BH CV ECHO MEAS - TR MAX VEL: 226.1 CM/SEC
LV EF 2D ECHO EST: 60 %
MAXIMAL PREDICTED HEART RATE: 158 BPM
STRESS TARGET HR: 134 BPM

## 2022-07-28 PROCEDURE — 25010000002 SULFUR HEXAFLUORIDE MICROSPH 60.7-25 MG RECONSTITUTED SUSPENSION: Performed by: INTERNAL MEDICINE

## 2022-07-28 PROCEDURE — 93306 TTE W/DOPPLER COMPLETE: CPT

## 2022-07-28 PROCEDURE — 93306 TTE W/DOPPLER COMPLETE: CPT | Performed by: INTERNAL MEDICINE

## 2022-07-28 RX ADMIN — SULFUR HEXAFLUORIDE 2 ML: KIT at 12:40

## 2022-08-26 ENCOUNTER — OFFICE (AMBULATORY)
Dept: URBAN - METROPOLITAN AREA CLINIC 64 | Facility: CLINIC | Age: 62
End: 2022-08-26

## 2022-08-26 VITALS
WEIGHT: 231 LBS | SYSTOLIC BLOOD PRESSURE: 109 MMHG | HEIGHT: 61 IN | HEART RATE: 99 BPM | DIASTOLIC BLOOD PRESSURE: 67 MMHG

## 2022-08-26 DIAGNOSIS — K21.9 GASTRO-ESOPHAGEAL REFLUX DISEASE WITHOUT ESOPHAGITIS: ICD-10-CM

## 2022-08-26 PROBLEM — R13.10 DYSPHAGIA: Status: ACTIVE | Noted: 2018-05-21

## 2022-08-26 PROCEDURE — 99214 OFFICE O/P EST MOD 30 MIN: CPT | Performed by: INTERNAL MEDICINE

## 2022-08-30 DIAGNOSIS — B39.2: Primary | ICD-10-CM

## 2022-08-31 ENCOUNTER — LAB (OUTPATIENT)
Dept: LAB | Facility: HOSPITAL | Age: 62
End: 2022-08-31

## 2022-08-31 DIAGNOSIS — B39.2: ICD-10-CM

## 2022-08-31 PROCEDURE — 87385 HISTOPLASMA CAPSUL AG IA: CPT

## 2022-09-02 LAB — H CAPSUL AG UR QL IA: <0.5

## 2022-09-11 ENCOUNTER — HOSPITAL ENCOUNTER (OUTPATIENT)
Facility: HOSPITAL | Age: 62
Setting detail: OBSERVATION
LOS: 1 days | Discharge: HOME | End: 2022-09-15
Attending: EMERGENCY MEDICINE | Admitting: FAMILY MEDICINE

## 2022-09-11 ENCOUNTER — APPOINTMENT (OUTPATIENT)
Dept: GENERAL RADIOLOGY | Facility: HOSPITAL | Age: 62
End: 2022-09-11

## 2022-09-11 DIAGNOSIS — R06.02 SHORTNESS OF BREATH: Primary | ICD-10-CM

## 2022-09-11 DIAGNOSIS — J84.9 ILD (INTERSTITIAL LUNG DISEASE): ICD-10-CM

## 2022-09-11 DIAGNOSIS — R05.9 COUGH: ICD-10-CM

## 2022-09-11 LAB
ALBUMIN SERPL-MCNC: 3.9 G/DL (ref 3.5–5.2)
ALBUMIN/GLOB SERPL: 1.2 G/DL
ALP SERPL-CCNC: 104 U/L (ref 39–117)
ALT SERPL W P-5'-P-CCNC: 14 U/L (ref 1–33)
ANION GAP SERPL CALCULATED.3IONS-SCNC: 11 MMOL/L (ref 5–15)
ARTERIAL PATENCY WRIST A: POSITIVE
AST SERPL-CCNC: 16 U/L (ref 1–32)
ATMOSPHERIC PRESS: NORMAL MM[HG]
B PARAPERT DNA SPEC QL NAA+PROBE: NOT DETECTED
B PERT DNA SPEC QL NAA+PROBE: NOT DETECTED
BACTERIA UR QL AUTO: ABNORMAL /HPF
BASE EXCESS BLDA CALC-SCNC: 1.2 MMOL/L (ref 0–3)
BASOPHILS # BLD AUTO: 0 10*3/MM3 (ref 0–0.2)
BASOPHILS NFR BLD AUTO: 0.8 % (ref 0–1.5)
BDY SITE: NORMAL
BILIRUB SERPL-MCNC: 0.3 MG/DL (ref 0–1.2)
BILIRUB UR QL STRIP: NEGATIVE
BUN SERPL-MCNC: 10 MG/DL (ref 8–23)
BUN/CREAT SERPL: 14.1 (ref 7–25)
C PNEUM DNA NPH QL NAA+NON-PROBE: NOT DETECTED
CALCIUM SPEC-SCNC: 9.4 MG/DL (ref 8.6–10.5)
CHLORIDE SERPL-SCNC: 101 MMOL/L (ref 98–107)
CLARITY UR: CLEAR
CO2 BLDA-SCNC: 26.4 MMOL/L (ref 22–29)
CO2 SERPL-SCNC: 26 MMOL/L (ref 22–29)
COLOR UR: YELLOW
CREAT SERPL-MCNC: 0.71 MG/DL (ref 0.57–1)
D DIMER PPP FEU-MCNC: 0.32 MG/L (FEU) (ref 0–0.59)
DEPRECATED RDW RBC AUTO: 48.6 FL (ref 37–54)
EGFRCR SERPLBLD CKD-EPI 2021: 96.3 ML/MIN/1.73
EOSINOPHIL # BLD AUTO: 0.2 10*3/MM3 (ref 0–0.4)
EOSINOPHIL NFR BLD AUTO: 3.6 % (ref 0.3–6.2)
ERYTHROCYTE [DISTWIDTH] IN BLOOD BY AUTOMATED COUNT: 15.9 % (ref 12.3–15.4)
FLUAV SUBTYP SPEC NAA+PROBE: NOT DETECTED
FLUBV RNA ISLT QL NAA+PROBE: NOT DETECTED
GLOBULIN UR ELPH-MCNC: 3.2 GM/DL
GLUCOSE SERPL-MCNC: 136 MG/DL (ref 65–99)
GLUCOSE UR STRIP-MCNC: NEGATIVE MG/DL
HADV DNA SPEC NAA+PROBE: NOT DETECTED
HCO3 BLDA-SCNC: 25.3 MMOL/L (ref 21–28)
HCOV 229E RNA SPEC QL NAA+PROBE: NOT DETECTED
HCOV HKU1 RNA SPEC QL NAA+PROBE: NOT DETECTED
HCOV NL63 RNA SPEC QL NAA+PROBE: NOT DETECTED
HCOV OC43 RNA SPEC QL NAA+PROBE: NOT DETECTED
HCT VFR BLD AUTO: 38.7 % (ref 34–46.6)
HEMODILUTION: NO
HGB BLD-MCNC: 12.9 G/DL (ref 12–15.9)
HGB UR QL STRIP.AUTO: NEGATIVE
HMPV RNA NPH QL NAA+NON-PROBE: NOT DETECTED
HPIV1 RNA ISLT QL NAA+PROBE: NOT DETECTED
HPIV2 RNA SPEC QL NAA+PROBE: NOT DETECTED
HPIV3 RNA NPH QL NAA+PROBE: NOT DETECTED
HPIV4 P GENE NPH QL NAA+PROBE: NOT DETECTED
HYALINE CASTS UR QL AUTO: ABNORMAL /LPF
INHALED O2 CONCENTRATION: 28 %
KETONES UR QL STRIP: ABNORMAL
LEUKOCYTE ESTERASE UR QL STRIP.AUTO: ABNORMAL
LYMPHOCYTES # BLD AUTO: 0.9 10*3/MM3 (ref 0.7–3.1)
LYMPHOCYTES NFR BLD AUTO: 15 % (ref 19.6–45.3)
M PNEUMO IGG SER IA-ACNC: NOT DETECTED
MCH RBC QN AUTO: 28.5 PG (ref 26.6–33)
MCHC RBC AUTO-ENTMCNC: 33.2 G/DL (ref 31.5–35.7)
MCV RBC AUTO: 85.8 FL (ref 79–97)
MODALITY: NORMAL
MONOCYTES # BLD AUTO: 0.7 10*3/MM3 (ref 0.1–0.9)
MONOCYTES NFR BLD AUTO: 11.5 % (ref 5–12)
NEUTROPHILS NFR BLD AUTO: 4.3 10*3/MM3 (ref 1.7–7)
NEUTROPHILS NFR BLD AUTO: 69.1 % (ref 42.7–76)
NITRITE UR QL STRIP: NEGATIVE
NRBC BLD AUTO-RTO: 0.1 /100 WBC (ref 0–0.2)
NT-PROBNP SERPL-MCNC: 71.8 PG/ML (ref 0–900)
PCO2 BLDA: 37.3 MM HG (ref 35–48)
PH BLDA: 7.44 PH UNITS (ref 7.35–7.45)
PH UR STRIP.AUTO: <=5 [PH] (ref 5–8)
PLATELET # BLD AUTO: 279 10*3/MM3 (ref 140–450)
PMV BLD AUTO: 7.8 FL (ref 6–12)
PO2 BLDA: 94.4 MM HG (ref 83–108)
POTASSIUM SERPL-SCNC: 4.3 MMOL/L (ref 3.5–5.2)
PROT SERPL-MCNC: 7.1 G/DL (ref 6–8.5)
PROT UR QL STRIP: NEGATIVE
RBC # BLD AUTO: 4.52 10*6/MM3 (ref 3.77–5.28)
RBC # UR STRIP: ABNORMAL /HPF
REF LAB TEST METHOD: ABNORMAL
RHINOVIRUS RNA SPEC NAA+PROBE: NOT DETECTED
RSV RNA NPH QL NAA+NON-PROBE: NOT DETECTED
SAO2 % BLDCOA: 97.6 % (ref 94–98)
SARS-COV-2 RNA NPH QL NAA+NON-PROBE: NOT DETECTED
SODIUM SERPL-SCNC: 138 MMOL/L (ref 136–145)
SP GR UR STRIP: 1.02 (ref 1–1.03)
SQUAMOUS #/AREA URNS HPF: ABNORMAL /HPF
TROPONIN T SERPL-MCNC: <0.01 NG/ML (ref 0–0.03)
UROBILINOGEN UR QL STRIP: ABNORMAL
WBC # UR STRIP: ABNORMAL /HPF
WBC NRBC COR # BLD: 6.2 10*3/MM3 (ref 3.4–10.8)

## 2022-09-11 PROCEDURE — 85379 FIBRIN DEGRADATION QUANT: CPT | Performed by: NURSE PRACTITIONER

## 2022-09-11 PROCEDURE — 83880 ASSAY OF NATRIURETIC PEPTIDE: CPT | Performed by: NURSE PRACTITIONER

## 2022-09-11 PROCEDURE — 80053 COMPREHEN METABOLIC PANEL: CPT | Performed by: NURSE PRACTITIONER

## 2022-09-11 PROCEDURE — 0202U NFCT DS 22 TRGT SARS-COV-2: CPT | Performed by: NURSE PRACTITIONER

## 2022-09-11 PROCEDURE — 85025 COMPLETE CBC W/AUTO DIFF WBC: CPT | Performed by: NURSE PRACTITIONER

## 2022-09-11 PROCEDURE — 71045 X-RAY EXAM CHEST 1 VIEW: CPT

## 2022-09-11 PROCEDURE — 99285 EMERGENCY DEPT VISIT HI MDM: CPT

## 2022-09-11 PROCEDURE — 93005 ELECTROCARDIOGRAM TRACING: CPT

## 2022-09-11 PROCEDURE — 82803 BLOOD GASES ANY COMBINATION: CPT

## 2022-09-11 PROCEDURE — 84484 ASSAY OF TROPONIN QUANT: CPT | Performed by: NURSE PRACTITIONER

## 2022-09-11 PROCEDURE — 94640 AIRWAY INHALATION TREATMENT: CPT

## 2022-09-11 PROCEDURE — 93005 ELECTROCARDIOGRAM TRACING: CPT | Performed by: EMERGENCY MEDICINE

## 2022-09-11 PROCEDURE — 81001 URINALYSIS AUTO W/SCOPE: CPT | Performed by: NURSE PRACTITIONER

## 2022-09-11 PROCEDURE — 36600 WITHDRAWAL OF ARTERIAL BLOOD: CPT

## 2022-09-11 PROCEDURE — 94799 UNLISTED PULMONARY SVC/PX: CPT

## 2022-09-11 RX ORDER — IPRATROPIUM BROMIDE AND ALBUTEROL SULFATE 2.5; .5 MG/3ML; MG/3ML
3 SOLUTION RESPIRATORY (INHALATION) ONCE
Status: COMPLETED | OUTPATIENT
Start: 2022-09-11 | End: 2022-09-11

## 2022-09-11 RX ORDER — SODIUM CHLORIDE 0.9 % (FLUSH) 0.9 %
10 SYRINGE (ML) INJECTION AS NEEDED
Status: DISCONTINUED | OUTPATIENT
Start: 2022-09-11 | End: 2022-09-15 | Stop reason: HOSPADM

## 2022-09-11 RX ORDER — GUAIFENESIN 600 MG/1
1200 TABLET, EXTENDED RELEASE ORAL 2 TIMES DAILY
COMMUNITY

## 2022-09-11 RX ORDER — PREDNISONE 1 MG/1
5 TABLET ORAL DAILY
COMMUNITY
Start: 2022-09-06 | End: 2022-09-15 | Stop reason: HOSPADM

## 2022-09-11 RX ORDER — BENZONATATE 100 MG/1
100 CAPSULE ORAL 3 TIMES DAILY PRN
COMMUNITY

## 2022-09-11 RX ORDER — HYDROCODONE BITARTRATE AND HOMATROPINE METHYLBROMIDE ORAL SOLUTION 5; 1.5 MG/5ML; MG/5ML
5 LIQUID ORAL EVERY 4 HOURS PRN
Status: DISCONTINUED | OUTPATIENT
Start: 2022-09-11 | End: 2022-09-15 | Stop reason: HOSPADM

## 2022-09-11 RX ADMIN — IPRATROPIUM BROMIDE AND ALBUTEROL SULFATE 3 ML: .5; 3 SOLUTION RESPIRATORY (INHALATION) at 21:19

## 2022-09-11 NOTE — ED NOTES
Pt reports persistent cough and soa s/p having covid on 7/4/22. Pt reports decreased spo2 per her home o2 sensor today. Pt reports recently seen by primary for same and is currently on steroids.

## 2022-09-11 NOTE — ED PROVIDER NOTES
Subjective   PIT    Patient presents with:  Shortness of Breath    Ariel, Valerio TIERNEY MD   No LMP recorded. Patient is postmenopausal.    Patient is a 62-year-old female presents emergency department the complaint of shortness of breath, low O2 saturation at home.  Patient reports this began after she had COVID-19 in July, she reports this week she is had worsening cough, dyspnea, and tonight the saturation was in the 80s at home.  She denies any chest pain.  No dizziness, lightheadedness, diaphoresis or syncope.  Reports productive cough and congestion no abnormal leg pain or swelling.  Patient's pulmonologist is Dr. Solano, she is normally wearing oxygen as needed, 2 L.          Review of Systems   Constitutional: Negative for chills and fever.   Respiratory: Positive for cough and shortness of breath.    Cardiovascular: Negative for chest pain and palpitations.   Gastrointestinal: Negative for abdominal pain.   Genitourinary: Negative for dysuria.   Musculoskeletal: Negative for back pain and neck pain.   Skin: Negative for color change and rash.   Neurological: Negative for dizziness, syncope, weakness and light-headedness.       Past Medical History:   Diagnosis Date   • GERD (gastroesophageal reflux disease)    • Hypertension    • Interstitial lung disease (HCC)    • Intraocular pressure increase, bilateral        Allergies   Allergen Reactions   • Oxycodone-Acetaminophen Rash       Past Surgical History:   Procedure Laterality Date   • ACHILLES TENDON SURGERY  2009   • APPENDECTOMY  1999   • BRONCHOSCOPY N/A 1/29/2022    Procedure: BRONCHOSCOPY with lung washing;  Surgeon: Dana Garcia MD;  Location: [facility] ENDOSCOPY;  Service: Pulmonary;  Laterality: N/A;  post op: pneumonia   • CARPAL TUNNEL RELEASE Right 1994   • TOTAL SHOULDER REVERSE ARTHROPLASTY Right 06/05/2018   • TUBAL ABDOMINAL LIGATION  2004       Family History   Problem Relation Age of Onset   • Hypertension Mother    • Heart disease Mother    • COPD  Mother    • Hypertension Father    • Diabetes Father    • Stroke Father        Social History     Socioeconomic History   • Marital status:    Tobacco Use   • Smoking status: Never Smoker   • Smokeless tobacco: Never Used   Substance and Sexual Activity   • Alcohol use: No   • Drug use: No           Objective   Physical Exam  Nursing note reviewed.   Constitutional:       General: She is not in acute distress.     Appearance: She is well-developed. She is not ill-appearing, toxic-appearing or diaphoretic.   HENT:      Head: Normocephalic and atraumatic.      Mouth/Throat:      Mouth: Mucous membranes are moist.      Pharynx: Oropharynx is clear.   Eyes:      Extraocular Movements: Extraocular movements intact.      Pupils: Pupils are equal, round, and reactive to light.   Cardiovascular:      Rate and Rhythm: Normal rate and regular rhythm.      Heart sounds: No murmur heard.    No friction rub. No gallop.   Pulmonary:      Effort: Pulmonary effort is normal.      Breath sounds: Examination of the right-upper field reveals wheezing. Wheezing present.   Abdominal:      General: Bowel sounds are normal.      Palpations: Abdomen is soft.   Musculoskeletal:         General: Normal range of motion.      Cervical back: Normal range of motion and neck supple.      Right lower leg: No tenderness. No edema.      Left lower leg: No tenderness. No edema.   Skin:     General: Skin is warm and dry.      Capillary Refill: Capillary refill takes less than 2 seconds.   Neurological:      General: No focal deficit present.      Mental Status: She is alert and oriented to person, place, and time.   Psychiatric:         Mood and Affect: Mood normal.         Behavior: Behavior normal.         Procedures           ED Course  ED Course as of 09/12/22 0052   Mon Sep 12, 2022   0046 Discussed with Huyen Orellana.  Patient was inadvertently admitted to the hospitalist initially. [LB]      ED Course User Index  [LB] Ning Armstrong,  "APRN      /71   Pulse 95   Temp 98 °F (36.7 °C) (Oral)   Resp 24   Ht 154.9 cm (61\")   Wt 104 kg (229 lb)   SpO2 96%   BMI 43.27 kg/m²   Labs Reviewed   COMPREHENSIVE METABOLIC PANEL - Abnormal; Notable for the following components:       Result Value    Glucose 136 (*)     All other components within normal limits    Narrative:     GFR Normal >60  Chronic Kidney Disease <60  Kidney Failure <15     URINALYSIS W/ MICROSCOPIC IF INDICATED (NO CULTURE) - Abnormal; Notable for the following components:    Ketones, UA Trace (*)     Leuk Esterase, UA Trace (*)     All other components within normal limits   CBC WITH AUTO DIFFERENTIAL - Abnormal; Notable for the following components:    RDW 15.9 (*)     Lymphocyte % 15.0 (*)     All other components within normal limits   URINALYSIS, MICROSCOPIC ONLY - Abnormal; Notable for the following components:    RBC, UA 0-2 (*)     WBC, UA 0-2 (*)     All other components within normal limits   RESPIRATORY PANEL PCR W/ COVID-19 (SARS-COV-2) [facility]/GRANT/LYN/PAD/COR/[facility] IN-HOUSE, NP SWAB IN UT/VTP, 3-4 HR TAT - Normal    Narrative:     In the setting of a positive respiratory panel with a viral infection PLUS a negative procalcitonin without other underlying concern for bacterial infection, consider observing off antibiotics or discontinuation of antibiotics and continue supportive care. If the respiratory panel is positive for atypical bacterial infection (Bordetella pertussis, Chlamydophila pneumoniae, or Mycoplasma pneumoniae), consider antibiotic de-escalation to target atypical bacterial infection.   BNP (IN-HOUSE) - Normal    Narrative:     Among patients with dyspnea, NT-proBNP is highly sensitive for the detection of acute congestive heart failure. In addition NT-proBNP of <300 pg/ml effectively rules out acute congestive heart failure with 99% negative predictive value.    Results may be falsely decreased if patient taking Biotin.     TROPONIN (IN-HOUSE) - " Normal    Narrative:     Troponin T Reference Range:  <= 0.03 ng/mL-   Negative for AMI  >0.03 ng/mL-     Abnormal for myocardial necrosis.  Clinicians would have to utilize clinical acumen, EKG, Troponin and serial changes to determine if it is an Acute Myocardial Infarction or myocardial injury due to an underlying chronic condition.       Results may be falsely decreased if patient taking Biotin.     D-DIMER, QUANTITATIVE - Normal    Narrative:     Reference Range  --------------------------------------------------------------------     < 0.50   Negative Predictive Value  0.50-0.59   Indeterminate    >= 0.60   Probable VTE             A very low percentage of patients with DVT may yield D-Dimer results   below the cut-off of 0.50 mg/L FEU.  This is known to be more   prevalent in patients with distal DVT.             Results of this test should always be interpreted in conjunction with   the patient's medical history, clinical presentation and other   findings.  Clinical diagnosis should not be based on the result of   INNOVANCE D-Dimer alone.   BLOOD GAS, ARTERIAL   BLOOD GAS, ARTERIAL   CBC AND DIFFERENTIAL    Narrative:     The following orders were created for panel order CBC & Differential.  Procedure                               Abnormality         Status                     ---------                               -----------         ------                     CBC Auto Differential[[id number]]        Abnormal            Final result                 Please view results for these tests on the individual orders.     Medications   sodium chloride 0.9 % flush 10 mL (has no administration in time range)   HYDROcodone Bit-Homatrop MBr (HYCODAN) 5-1.5 MG/5ML solution 5 mL (5 mL Oral Given 9/12/22 0032)   ipratropium-albuterol (DUO-NEB) nebulizer solution 3 mL (3 mL Nebulization Given 9/11/22 2119)     No radiology results for the last day                                       MDM    Differentials: Pneumonia, CHF,  PE  Patient was brought back to the emergency department room for evaluation and placed on appropriate monitoring. Patient had IV established and blood work obtained.  CBC CMP reviewed.  D-dimer is negative therefore CT chest PE protocol ordered.  Urine nonconcerning.  Troponin normal.  Respiratory viral panel negative.  Chest x-ray interpreted per ED physician, viewed by me reveals no acute findings.  Given DuoNeb treatment for wheezing.  She was ambulated here in the ED and was able to maintain her O2 saturations.  She actually dropped down to 8788% while ambulating with oxygen on.  She will be admitted for further evaluation.  Tests considered but not performed: CT chest PE protocol, however patient's D-dimer was negative.  Managment of patient discussed with: Family medicine for admission, Huyen Hermosillo discussed with the patient their test results, work-up here in the emergency department, and need for admission and further evaluation. Patient is agreeable to the plan of care. At time of disposition patients VS are reviewed, and patient without acute distress.  Opportunity was provided for questions at the bedside, all questions and concerns were addressed.  Note Disclaimer: At [facility], we believe that sharing information builds trust and better relationships. You are receiving this note because you recently visited [facility]. It is possible you will see health information before a provider has talked with you about it. This kind of information can be easy to misunderstand. To help you fully understand what it means for your health, we urge you to discuss this note with your provider.  Note dicatated utilizing Dragon Dictation.    Final diagnoses:   Shortness of breath   Cough       ED Disposition  ED Disposition     ED Disposition   Decision to Admit    Condition   --    Comment   Level of Care: Telemetry [5]   Admitting Physician: JI ALVARES [[id number]]   Attending Physician: JI ALVARES  [[id number]]               No follow-up provider specified.       Medication List      No changes were made to your prescriptions during this visit.          Ning Armstrong, APRN  09/12/22 0052

## 2022-09-12 PROCEDURE — 94799 UNLISTED PULMONARY SVC/PX: CPT

## 2022-09-12 PROCEDURE — G0378 HOSPITAL OBSERVATION PER HR: HCPCS

## 2022-09-12 PROCEDURE — 96374 THER/PROPH/DIAG INJ IV PUSH: CPT

## 2022-09-12 PROCEDURE — 96376 TX/PRO/DX INJ SAME DRUG ADON: CPT

## 2022-09-12 PROCEDURE — 96372 THER/PROPH/DIAG INJ SC/IM: CPT

## 2022-09-12 PROCEDURE — 25010000002 ENOXAPARIN PER 10 MG: Performed by: NURSE PRACTITIONER

## 2022-09-12 PROCEDURE — 97162 PT EVAL MOD COMPLEX 30 MIN: CPT

## 2022-09-12 PROCEDURE — 25010000002 METHYLPREDNISOLONE PER 40 MG: Performed by: NURSE PRACTITIONER

## 2022-09-12 PROCEDURE — 25010000002 AMPICILLIN-SULBACTAM PER 1.5 G: Performed by: INTERNAL MEDICINE

## 2022-09-12 RX ORDER — CETIRIZINE HYDROCHLORIDE 10 MG/1
5 TABLET ORAL DAILY
Status: DISCONTINUED | OUTPATIENT
Start: 2022-09-12 | End: 2022-09-15 | Stop reason: HOSPADM

## 2022-09-12 RX ORDER — CITALOPRAM 20 MG/1
40 TABLET ORAL DAILY
Status: DISCONTINUED | OUTPATIENT
Start: 2022-09-12 | End: 2022-09-15 | Stop reason: HOSPADM

## 2022-09-12 RX ORDER — IPRATROPIUM BROMIDE AND ALBUTEROL SULFATE 2.5; .5 MG/3ML; MG/3ML
3 SOLUTION RESPIRATORY (INHALATION)
Status: DISCONTINUED | OUTPATIENT
Start: 2022-09-12 | End: 2022-09-15 | Stop reason: HOSPADM

## 2022-09-12 RX ORDER — BENZONATATE 100 MG/1
100 CAPSULE ORAL 3 TIMES DAILY PRN
Status: DISCONTINUED | OUTPATIENT
Start: 2022-09-12 | End: 2022-09-15 | Stop reason: HOSPADM

## 2022-09-12 RX ORDER — SODIUM CHLORIDE 0.9 % (FLUSH) 0.9 %
3-10 SYRINGE (ML) INJECTION AS NEEDED
Status: DISCONTINUED | OUTPATIENT
Start: 2022-09-12 | End: 2022-09-15 | Stop reason: HOSPADM

## 2022-09-12 RX ORDER — IBUPROFEN 400 MG/1
400 TABLET ORAL EVERY 6 HOURS PRN
Status: DISCONTINUED | OUTPATIENT
Start: 2022-09-12 | End: 2022-09-15 | Stop reason: HOSPADM

## 2022-09-12 RX ORDER — ENOXAPARIN SODIUM 100 MG/ML
40 INJECTION SUBCUTANEOUS 2 TIMES DAILY
Status: DISCONTINUED | OUTPATIENT
Start: 2022-09-12 | End: 2022-09-15 | Stop reason: HOSPADM

## 2022-09-12 RX ORDER — MONTELUKAST SODIUM 10 MG/1
10 TABLET ORAL NIGHTLY
Status: DISCONTINUED | OUTPATIENT
Start: 2022-09-12 | End: 2022-09-15 | Stop reason: HOSPADM

## 2022-09-12 RX ORDER — PANTOPRAZOLE SODIUM 40 MG/1
40 TABLET, DELAYED RELEASE ORAL DAILY
Status: DISCONTINUED | OUTPATIENT
Start: 2022-09-12 | End: 2022-09-15 | Stop reason: HOSPADM

## 2022-09-12 RX ORDER — TRAMADOL HYDROCHLORIDE 50 MG/1
50 TABLET ORAL EVERY 6 HOURS PRN
Status: DISCONTINUED | OUTPATIENT
Start: 2022-09-12 | End: 2022-09-15 | Stop reason: HOSPADM

## 2022-09-12 RX ORDER — ONDANSETRON 2 MG/ML
4 INJECTION INTRAMUSCULAR; INTRAVENOUS EVERY 6 HOURS PRN
Status: DISCONTINUED | OUTPATIENT
Start: 2022-09-12 | End: 2022-09-15 | Stop reason: HOSPADM

## 2022-09-12 RX ORDER — HYDRALAZINE HYDROCHLORIDE 20 MG/ML
10 INJECTION INTRAMUSCULAR; INTRAVENOUS EVERY 6 HOURS PRN
Status: DISCONTINUED | OUTPATIENT
Start: 2022-09-12 | End: 2022-09-15 | Stop reason: HOSPADM

## 2022-09-12 RX ORDER — METHYLPREDNISOLONE SODIUM SUCCINATE 40 MG/ML
40 INJECTION, POWDER, LYOPHILIZED, FOR SOLUTION INTRAMUSCULAR; INTRAVENOUS EVERY 8 HOURS
Status: DISCONTINUED | OUTPATIENT
Start: 2022-09-12 | End: 2022-09-14

## 2022-09-12 RX ORDER — ONDANSETRON 4 MG/1
4 TABLET, FILM COATED ORAL EVERY 6 HOURS PRN
Status: DISCONTINUED | OUTPATIENT
Start: 2022-09-12 | End: 2022-09-15 | Stop reason: HOSPADM

## 2022-09-12 RX ORDER — FAMOTIDINE 20 MG/1
40 TABLET, FILM COATED ORAL DAILY
Status: DISCONTINUED | OUTPATIENT
Start: 2022-09-12 | End: 2022-09-15 | Stop reason: HOSPADM

## 2022-09-12 RX ORDER — CHOLECALCIFEROL (VITAMIN D3) 125 MCG
5 CAPSULE ORAL NIGHTLY PRN
Status: DISCONTINUED | OUTPATIENT
Start: 2022-09-12 | End: 2022-09-15 | Stop reason: HOSPADM

## 2022-09-12 RX ORDER — SODIUM CHLORIDE 0.9 % (FLUSH) 0.9 %
3 SYRINGE (ML) INJECTION EVERY 12 HOURS SCHEDULED
Status: DISCONTINUED | OUTPATIENT
Start: 2022-09-12 | End: 2022-09-15 | Stop reason: HOSPADM

## 2022-09-12 RX ORDER — LOSARTAN POTASSIUM 50 MG/1
100 TABLET ORAL DAILY
Status: DISCONTINUED | OUTPATIENT
Start: 2022-09-12 | End: 2022-09-15 | Stop reason: HOSPADM

## 2022-09-12 RX ADMIN — PANTOPRAZOLE SODIUM 40 MG: 40 TABLET, DELAYED RELEASE ORAL at 12:02

## 2022-09-12 RX ADMIN — ENOXAPARIN SODIUM 40 MG: 100 INJECTION SUBCUTANEOUS at 20:44

## 2022-09-12 RX ADMIN — CITALOPRAM HYDROBROMIDE 40 MG: 20 TABLET ORAL at 12:02

## 2022-09-12 RX ADMIN — IPRATROPIUM BROMIDE AND ALBUTEROL SULFATE 3 ML: .5; 3 SOLUTION RESPIRATORY (INHALATION) at 19:24

## 2022-09-12 RX ADMIN — MONTELUKAST 10 MG: 10 TABLET, FILM COATED ORAL at 20:44

## 2022-09-12 RX ADMIN — ENOXAPARIN SODIUM 40 MG: 100 INJECTION SUBCUTANEOUS at 08:41

## 2022-09-12 RX ADMIN — HYDROCODONE BITARTRATE AND HOMATROPINE METHYLBROMIDE 5 ML: 1.5; 5 SYRUP ORAL at 00:32

## 2022-09-12 RX ADMIN — Medication 3 ML: at 08:40

## 2022-09-12 RX ADMIN — METHYLPREDNISOLONE SODIUM SUCCINATE 40 MG: 40 INJECTION, POWDER, FOR SOLUTION INTRAMUSCULAR; INTRAVENOUS at 17:52

## 2022-09-12 RX ADMIN — AMPICILLIN SODIUM AND SULBACTAM SODIUM 3 G: 2; 1 INJECTION, POWDER, FOR SOLUTION INTRAMUSCULAR; INTRAVENOUS at 19:09

## 2022-09-12 RX ADMIN — FAMOTIDINE 40 MG: 20 TABLET, FILM COATED ORAL at 12:02

## 2022-09-12 RX ADMIN — METHYLPREDNISOLONE SODIUM SUCCINATE 40 MG: 40 INJECTION, POWDER, FOR SOLUTION INTRAMUSCULAR; INTRAVENOUS at 10:56

## 2022-09-12 RX ADMIN — Medication 3 ML: at 20:45

## 2022-09-12 RX ADMIN — CETIRIZINE HYDROCHLORIDE 5 MG: 10 TABLET, FILM COATED ORAL at 12:03

## 2022-09-12 RX ADMIN — HYDROCODONE BITARTRATE AND HOMATROPINE METHYLBROMIDE 5 ML: 1.5; 5 SYRUP ORAL at 18:05

## 2022-09-12 RX ADMIN — LOSARTAN POTASSIUM 100 MG: 50 TABLET, FILM COATED ORAL at 12:02

## 2022-09-12 RX ADMIN — BENZONATATE 100 MG: 100 CAPSULE ORAL at 12:02

## 2022-09-12 NOTE — THERAPY EVALUATION
Patient Name: Mariel Parker  : 1960    MRN: [id number]                              Today's Date: 2022       Admit Date: 2022    Visit Dx:     ICD-10-CM ICD-9-CM   1. Shortness of breath  R06.02 786.05   2. Cough  R05.9 786.2     Patient Active Problem List   Diagnosis   • Abdominal pain   • Acute laryngitis   • Asthma   • Asthma   • Asthma with acute exacerbation   • Chronic obstructive pulmonary disease (HCC)   • Encounter for immunization   • Orthopedic aftercare   • Family history of cerebrovascular accident (CVA)   • Generalized anxiety disorder   • History of pneumonia   • Hyperlipidemia   • Hypertension   • Interstitial lung disease (HCC)   • Knee pain   • Nausea   • Need for other prophylactic vaccination and inoculation against single diseases   • Obesity   • Osteoarthritis of knee   • Osteoarthritis of right shoulder   • Osteoporosis   • Pain in hip   • Pain of hand   • Pain in right shoulder   • Lumbar radiculitis   • Radiculopathy   • Rheumatoid arthritis involving multiple sites (HCC)   • Sciatica   • Shortness of breath   • Undifferentiated inflammatory polyarthritis (HCC)   • Pneumonia due to COVID-19 virus   • Interstitial lung disease (HCC)   • Mood disorder (HCC)   • GERD without esophagitis   • Cytokine release syndrome, grade 1     Past Medical History:   Diagnosis Date   • GERD (gastroesophageal reflux disease)    • Hypertension    • Interstitial lung disease (HCC)    • Intraocular pressure increase, bilateral    • Rheumatoid arthritis (HCC)      Past Surgical History:   Procedure Laterality Date   • ACHILLES TENDON SURGERY     • APPENDECTOMY     • BRONCHOSCOPY N/A 2022    Procedure: BRONCHOSCOPY with lung washing;  Surgeon: Dana Garcia MD;  Location: [facility] ENDOSCOPY;  Service: Pulmonary;  Laterality: N/A;  post op: pneumonia   • CARPAL TUNNEL RELEASE Right    • TOTAL SHOULDER REVERSE ARTHROPLASTY Right 2018   • TUBAL ABDOMINAL LIGATION         General Information     Row Name 09/12/22 [facility]5          Physical Therapy Time and Intention    Document Type evaluation  -AM     Mode of Treatment physical therapy  -AM     Row Name 09/12/22 1325          General Information    Patient Profile Reviewed yes  -AM     Prior Level of Function independent:;community mobility;gait;transfer;bed mobility;using stairs  -AM     Existing Precautions/Restrictions no known precautions/restrictions  -AM     Barriers to Rehab none identified  -AM     Row Name 09/12/22 1325          Living Environment    People in Home spouse  -AM     Row Name 09/12/22 [facility]5          Home Main Entrance    Number of Stairs, Main Entrance one  -AM     Stair Railings, Main Entrance railings safe and in good condition  -AM     Row Name 09/12/22 1325          Stairs Within Home, Primary    Number of Stairs, Within Home, Primary none  -AM     Row Name 09/12/22 1325          Cognition    Orientation Status (Cognition) oriented x 4  -AM     Row Name 09/12/22 1325          Safety Issues, Functional Mobility    Impairments Affecting Function (Mobility) balance;endurance/activity tolerance;shortness of breath  -AM     Comment, Safety Issues/Impairments (Mobility) gait belt utilized  -AM           User Key  (r) = Recorded By, (t) = Taken By, (c) = Cosigned By    Initials Name Provider Type    AM Prince Villeda, PT Physical Therapist               Mobility     Row Name 09/12/22 1326          Bed Mobility    Bed Mobility bed mobility (all) activities  -AM     All Activities, [place] (Bed Mobility) modified independence  -AM     Row Name 09/12/22 1326          Sit-Stand Transfer    Sit-Stand [place] (Transfers) contact guard;1 person assist;verbal cues  -AM     Comment, (Sit-Stand Transfer) cues for hand placement  -AM     Row Name 09/12/22 1326          Gait/Stairs (Locomotion)    [place] Level (Gait) contact guard;minimum assist (75% patient effort);1 person assist  -AM     Distance in Feet (Gait)  50'  -AM     Comment, (Gait/Stairs) wide FAUSTO, decreased gait speed, slight LOB with turn  -AM           User Key  (r) = Recorded By, (t) = Taken By, (c) = Cosigned By    Initials Name Provider Type    AM Prince Villeda, PT Physical Therapist               Obj/Interventions     Row Name 09/12/22 1327          Range of Motion Comprehensive    General Range of Motion no range of motion deficits identified  -AM     Row Name 09/12/22 1327          Strength Comprehensive (MMT)    General Manual Muscle Testing (MMT) Assessment no strength deficits identified  -AM     Row Name 09/12/22 1327          Motor Skills    Motor Skills functional endurance  -AM     Functional Endurance fair +  -AM     Row Name 09/12/22 1327          Balance    Balance Assessment sitting static balance;sit to stand dynamic balance;standing static balance;standing dynamic balance;sitting dynamic balance  -AM     Static Sitting Balance independent  -AM     Dynamic Sitting Balance independent  -AM     Position, Sitting Balance unsupported;sitting edge of bed  -AM     Sit to Stand Dynamic Balance contact guard;1-person assist  -AM     Static Standing Balance contact guard;1-person assist  -AM     Dynamic Standing Balance contact guard;minimal assist;1-person assist  -AM     Position/Device Used, Standing Balance unsupported  -AM     Row Name 09/12/22 1327          Sensory Assessment (Somatosensory)    Sensory Assessment (Somatosensory) sensation intact  -AM           User Key  (r) = Recorded By, (t) = Taken By, (c) = Cosigned By    Initials Name Provider Type    AM Prince Villeda, PT Physical Therapist               Goals/Plan     Row Name 09/12/22 1331          Transfer Goal 1 (PT)    Activity/Assistive Device (Transfer Goal 1, PT) transfers, all  -AM     [place] Level/Cues Needed (Transfer Goal 1, PT) modified independence  -AM     Time Frame (Transfer Goal 1, PT) long term goal (LTG)  -AM     Row Name 09/12/22 1331          Gait Training Goal 1  (PT)    Activity/Assistive Device (Gait Training Goal 1, PT) gait (walking locomotion)  -AM     [place] Level (Gait Training Goal 1, PT) modified independence  -AM     Distance (Gait Training Goal 1, PT) 150'  -AM     Time Frame (Gait Training Goal 1, PT) long term goal (LTG)  -AM     Row Name 09/12/22 1331          Stairs Goal 1 (PT)    Activity/Assistive Device (Stairs Goal 1, PT) stairs, all skills  -AM     King Level/Cues Needed (Stairs Goal 1, PT) modified independence  -AM     Number of Stairs (Stairs Goal 1, PT) 1  -AM     Time Frame (Stairs Goal 1, PT) long term goal (LTG)  -AM     Row Name 09/12/22 1331          Therapy Assessment/Plan (PT)    Planned Therapy Interventions (PT) balance training;bed mobility training;gait training;strengthening;stair training;patient/family education;transfer training  -AM           User Key  (r) = Recorded By, (t) = Taken By, (c) = Cosigned By    Initials Name Provider Type    AM Prince Villeda, PT Physical Therapist               Clinical Impression     Row Name 09/12/22 1328          Pain    Pretreatment Pain Rating 0/10 - no pain  -AM     Posttreatment Pain Rating 0/10 - no pain  -AM     Row Name 09/12/22 1328          Plan of Care Review    Plan of Care Reviewed With patient  -AM     Outcome Evaluation Pt is a 63 y/o female with persistent cough and SOA since having COVID 7/4.  Home O2 sats in 80s night before hospital admit.  Chest X-ray (-).  Pt reports she lives with her  in a 1 story home with 1 step to enter into home.  Pt was mod I with all functional mobility tasks and did not utilize an assistive device prior to hospitalization.  Pt on 1L O2 and telemetry this date.  Pt was mod I for bed mobility, cga for transfers and ambulate 50' with cga/min A with 1 slight LOB with turn.  Pt with decreased endurance with ambulation.  Recommend HHPT.  -AM     Row Name 09/12/22 1328          Therapy Assessment/Plan (PT)    Patient/Family Therapy Goals  Statement (PT) To go home  -AM     Rehab Potential (PT) good, to achieve stated therapy goals  -AM     Criteria for Skilled Interventions Met (PT) yes  -AM     Therapy Frequency (PT) 3 times/wk  -AM     Predicted Duration of Therapy Intervention (PT) until d/c  -AM     Row Name 09/12/22 1328          Vital Signs    O2 Delivery Pre Treatment nasal cannula  1L  -AM     O2 Delivery Intra Treatment nasal cannula  -AM     O2 Delivery Post Treatment nasal cannula  -AM     Pre Patient Position Supine  -AM     Intra Patient Position Standing  -AM     Post Patient Position Supine  -AM     Row Name 09/12/22 1328          Positioning and Restraints    Pre-Treatment Position in bed  -AM     Post Treatment Position bed  -AM     In Bed supine;encouraged to call for assist;call light within reach  -AM           User Key  (r) = Recorded By, (t) = Taken By, (c) = Cosigned By    Initials Name Provider Type    AM Prince Villeda, BEL Physical Therapist               Outcome Measures     Row Name 09/12/22 1332 09/12/22 0800       How much help from another person do you currently need...    Turning from your back to your side while in flat bed without using bedrails? 4  -AM 4  -AB    Moving from lying on back to sitting on the side of a flat bed without bedrails? 4  -AM 4  -AB    Moving to and from a bed to a chair (including a wheelchair)? 3  -AM 4  -AB    Standing up from a chair using your arms (e.g., wheelchair, bedside chair)? 3  -AM 4  -AB    Climbing 3-5 steps with a railing? 2  -AM 3  -AB    To walk in hospital room? 3  -AM 3  -AB    AM-PAC 6 Clicks Score (PT) 19  -AM 22  -AB          User Key  (r) = Recorded By, (t) = Taken By, (c) = Cosigned By    Initials Name Provider Type    AM Prince Villeda, PT Physical Therapist    Prince Mckinney LPN Licensed Nurse                             Physical Therapy Education                 Title: PT OT SLP Therapies (Done)     Topic: Physical Therapy (Done)     Point: Mobility training  (Done)     Learning Progress Summary           Patient Acceptance, E,TB, VU by AM at 9/12/2022 1332                   Point: Home exercise program (Done)     Learning Progress Summary           Patient Acceptance, E,TB, VU by AM at 9/12/2022 1332                   Point: Body mechanics (Done)     Learning Progress Summary           Patient Acceptance, E,TB, VU by AM at 9/12/2022 1332                   Point: Precautions (Done)     Learning Progress Summary           Patient Acceptance, E,TB, VU by AM at 9/12/2022 1332                               User Key     Initials Effective Dates Name Provider Type Discipline    AM 05/10/21 -  Prince Villeda, PT Physical Therapist PT              PT Recommendation and Plan  Planned Therapy Interventions (PT): balance training, bed mobility training, gait training, strengthening, stair training, patient/family education, transfer training  Plan of Care Reviewed With: patient  Outcome Evaluation: Pt is a 61 y/o female with persistent cough and SOA since having COVID 7/4.  Home O2 sats in 80s night before hospital admit.  Chest X-ray (-).  Pt reports she lives with her  in a 1 story home with 1 step to enter into home.  Pt was mod I with all functional mobility tasks and did not utilize an assistive device prior to hospitalization.  Pt on 1L O2 and telemetry this date.  Pt was mod I for bed mobility, cga for transfers and ambulate 50' with cga/min A with 1 slight LOB with turn.  Pt with decreased endurance with ambulation.  Recommend HHPT.     Time Calculation:    PT Charges     Row Name 09/12/22 1333             Time Calculation    Start Time 1045  -AM      Stop Time 1105  -AM      Time Calculation (min) 20 min  -AM      PT Received On 09/12/22  -AM      PT - Next Appointment 09/14/22  -AM      PT Goal Re-Cert Due Date 09/26/22  -AM            User Key  (r) = Recorded By, (t) = Taken By, (c) = Cosigned By    Initials Name Provider Type    AM Prince Villeda, BEL Physical  Therapist              Therapy Charges for Today     Code Description Service Date Service Provider Modifiers Qty    54883239302 HC PT EVAL MOD COMPLEXITY 3 9/12/2022 Prince Villeda, PT GP 1          PT G-Ni  AM-PAC 6 Clicks Score (PT): 19    Prince Villeda, PT  9/12/2022

## 2022-09-12 NOTE — CASE MANAGEMENT/SOCIAL WORK
Discharge Planning Assessment  [facility]     Patient Name: Mariel Parker  MRN: [id number]  Today's Date: 9/12/2022    Admit Date: 9/11/2022     Discharge Needs Assessment     Row Name 09/12/22 1254       Living Environment    People in Home spouse    Name(s) of People in Home Spouse- Jemal    Current Living Arrangements home    Primary Care Provided by self    Provides Primary Care For no one    Family Caregiver if Needed spouse    Family Caregiver Names Jemal    Quality of Family Relationships supportive    Able to Return to Prior Arrangements yes       Resource/Environmental Concerns    Resource/Environmental Concerns none    Transportation Concerns none       Transition Planning    Patient/Family Anticipates Transition to home    Patient/Family Anticipated Services at Transition none    Transportation Anticipated family or friend will provide       Discharge Needs Assessment    Readmission Within the Last 30 Days no previous admission in last 30 days    Equipment Currently Used at Home nebulizer;oxygen    Concerns to be Addressed denies needs/concerns at this time;discharge planning    Anticipated Changes Related to Illness none    Equipment Needed After Discharge none               Discharge Plan     Row Name 09/12/22 1256       Plan    Plan DC Plan: Home with spouse    Patient/Family in Agreement with Plan yes    Plan Comments The patient reports that she lives with her spouse. PCP and Pharmacy verified. The patient reports that she uses O2 @ 2L at home. She reports that she had Covid in January and was oxygen at that time. She reports that until recently she had not been using it but was told to keep it in case she needed it. She reports that the O2 is provided by [facility]. She reports that she has a nebulizer at home as well but has not been having to use it. She denies any HH/PT. She reports that she is IADL's at home and that her spouse will provide transport at discharge.           Expected Discharge  Date and Time     Expected Discharge Date Expected Discharge Time    Sep 14, 2022          Demographic Summary     Row Name 09/12/22 1253       General Information    Admission Type observation    Arrived From emergency department    Referral Source admission list    Reason for Consult discharge planning    Preferred Language English       Contact Information    Permission Granted to Share Info With                Functional Status     Row Name 09/12/22 1253       Functional Status    Usual Activity Tolerance moderate    Current Activity Tolerance moderate       Functional Status, IADL    Medications independent    Meal Preparation independent    Housekeeping independent    Laundry independent    Shopping independent       Mental Status    General Appearance WDL WDL       Mental Status Summary    Recent Changes in Mental Status/Cognitive Functioning no changes         Met with patient in room wearing PPE: mask/googles  Maintained distance greater than 6 feet and spent less than 15 minutes in the room.    Nadia Braxton RN     Office Phone: [phone]  Office Cell: [phone]

## 2022-09-12 NOTE — PLAN OF CARE
Goal Outcome Evaluation:  Plan of Care Reviewed With: patient        Progress: no change   Pt resting comfortably with no complaints. Pt has dry nonproductive cough. Currently on 2L O2. Will continue to monitor...

## 2022-09-12 NOTE — PLAN OF CARE
Goal Outcome Evaluation:  Plan of Care Reviewed With: patient           Outcome Evaluation: Pt is a 63 y/o female with persistent cough and SOA since having COVID 7/4.  Home O2 sats in 80s night before hospital admit.  Chest X-ray (-).  Pt reports she lives with her  in a 1 story home with 1 step to enter into home.  Pt was mod I with all functional mobility tasks and did not utilize an assistive device prior to hospitalization.  Pt on 1L O2 and telemetry this date.  Pt was mod I for bed mobility, cga for transfers and ambulate 50' with cga/min A with 1 slight LOB with turn.  Pt with decreased endurance with ambulation.  Recommend HHPT.

## 2022-09-12 NOTE — H&P
Patient Care Team:  Valerio George MD as PCP - General (Family Medicine)    Chief complaint Dyspnea    Subjective     Patient is a 62 y.o. female who presents with past medical history hypertension, interstitial lung disease, rheumatoid arthritis, GERD, and history of COVID-19 which she was hospitalized in January 2022 and again had COVID in July without hospitalization.  She does have home oxygen as needed.  She states that she has had a cough since her first COVID.  She states that she sees Dr. Solano for pulmonology and had a recent CT scan of the chest.  She also sees GI for acid reflux and has been taking Protonix and Pepcid.  She also states that at home her oxygen saturation had been decreasing so she presented to the emergency department.  On examination this morning her biggest complaint is the shortness of breath and continuous cough.  She will be admitted for further evaluation and treatment to include pulmonology consult.      Review of Systems   Constitutional: Positive for activity change and fatigue.   HENT: Negative.    Respiratory: Positive for cough and shortness of breath.    Cardiovascular: Negative.    Gastrointestinal: Negative.    Genitourinary: Negative.    Musculoskeletal: Negative.    Neurological: Negative.    Psychiatric/Behavioral: Negative.           History  Past Medical History:   Diagnosis Date   • GERD (gastroesophageal reflux disease)    • Hypertension    • Interstitial lung disease (HCC)    • Intraocular pressure increase, bilateral    • Rheumatoid arthritis (HCC)      Past Surgical History:   Procedure Laterality Date   • ACHILLES TENDON SURGERY  2009   • APPENDECTOMY  1999   • BRONCHOSCOPY N/A 1/29/2022    Procedure: BRONCHOSCOPY with lung washing;  Surgeon: Dana Garcia MD;  Location: [facility];  Service: Pulmonary;  Laterality: N/A;  post op: pneumonia   • CARPAL TUNNEL RELEASE Right 1994   • TOTAL SHOULDER REVERSE ARTHROPLASTY Right 06/05/2018   • TUBAL ABDOMINAL  LIGATION  2004     Family History   Problem Relation Age of Onset   • Hypertension Mother    • Heart disease Mother    • COPD Mother    • Hypertension Father    • Diabetes Father    • Stroke Father      Social History     Tobacco Use   • Smoking status: Never Smoker   • Smokeless tobacco: Never Used   Vaping Use   • Vaping Use: Never used   Substance Use Topics   • Alcohol use: Yes     Comment: wine occasionally   • Drug use: No     Medications Prior to Admission   Medication Sig Dispense Refill Last Dose   • acetaminophen (TYLENOL) 650 MG 8 hr tablet Take 650 mg by mouth Every 8 (Eight) Hours As Needed for Mild Pain .   Patient Taking Differently at Unknown time   • albuterol (PROVENTIL) (2.5 MG/3ML) 0.083% nebulizer solution Take 2.5 mg by nebulization Every 6 (Six) Hours As Needed for Wheezing or Shortness of Air.   9/11/2022 at Unknown time   • albuterol sulfate HFA (ProAir HFA) 108 (90 Base) MCG/ACT inhaler Inhale 2 puffs 4 (Four) Times a Day. 1 g 0 9/11/2022 at 1100   • ascorbic acid (VITAMIN C) 500 MG tablet Take 1 tablet by mouth Daily. 30 tablet 0 9/11/2022 at Unknown time   • B Complex Vitamins (VITAMIN B COMPLEX) capsule capsule B COMPLEX CAPS   9/11/2022 at Unknown time   • benzonatate (Tessalon Perles) 100 MG capsule Take 100 mg by mouth 3 (Three) Times a Day As Needed for Cough.   9/11/2022 at 1400   • Calcium Citrate-Vitamin D 200-125 MG-UNIT tablet Take 2 tablets by mouth Daily.   9/11/2022 at Unknown time   • citalopram (CeleXA) 40 MG tablet Take 1 tablet by mouth Every Morning. 90 tablet 2 9/11/2022 at Unknown time   • famotidine (PEPCID) 40 MG tablet Take 40 mg by mouth Daily.   9/10/2022 at Unknown time   • fexofenadine (ALLEGRA) 180 MG tablet Take 180 mg by mouth Daily.   9/11/2022 at Unknown time   • fluticasone (FLONASE) 50 MCG/ACT nasal spray 2 sprays into the nostril(s) as directed by provider Daily.   9/11/2022 at Unknown time   • fluticasone-salmeterol (ADVAIR) 500-50 MCG/DOSE DISKUS  Inhale 1 puff 2 (Two) Times a Day.   9/11/2022 at Unknown time   • guaiFENesin (MUCINEX) 600 MG 12 hr tablet Take 1,200 mg by mouth 2 (Two) Times a Day.   9/11/2022 at 1030   • losartan (Cozaar) 100 MG tablet Take 1 tablet by mouth Daily. 90 tablet 2 9/10/2022 at Unknown time   • montelukast (SINGULAIR) 10 MG tablet Take 10 mg by mouth Every Night.   9/10/2022 at Unknown time   • pantoprazole (PROTONIX) 40 MG EC tablet Take 40 mg by mouth Daily.   9/11/2022 at Unknown time   • predniSONE (DELTASONE) 5 MG tablet Take 5 mg by mouth Daily.   9/11/2022 at 0900   • zinc sulfate (ZINCATE) 220 (50 Zn) MG capsule Take 1 capsule by mouth Daily. 30 capsule 0 9/10/2022 at Unknown time   • Multiple Vitamins-Minerals (EYE VITAMINS & MINERALS) tablet Take 1 tablet by mouth Daily.      • riTUXimab (Rituxan) 100 MG/10ML solution injection Inject 100 mg as directed Every 6 (Six) Months. Next dose due December 2022 6/1/2022   • traMADol (ULTRAM) 50 MG tablet TAKE 2 TABLETS BY MOUTH TWICE DAILY AS NEEDED   More than a month at Unknown time     Allergies:  Oxycodone-acetaminophen    Objective     Vital Signs  Temp:  [98 °F (36.7 °C)-98.4 °F (36.9 °C)] 98.4 °F (36.9 °C)  Heart Rate:  [] 92  Resp:  [18-24] 22  BP: (114-154)/(66-74) 122/74       Physical Exam  Vitals reviewed.   Constitutional:       Appearance: She is not ill-appearing.   HENT:      Head: Normocephalic and atraumatic.      Right Ear: External ear normal.      Left Ear: External ear normal.      Nose: Nose normal.      Mouth/Throat:      Mouth: Mucous membranes are moist.   Eyes:      General:         Right eye: No discharge.         Left eye: No discharge.   Cardiovascular:      Rate and Rhythm: Normal rate and regular rhythm.      Pulses: Normal pulses.   Pulmonary:      Effort: Pulmonary effort is normal.      Breath sounds: Wheezing present.   Abdominal:      General: Bowel sounds are normal.      Palpations: Abdomen is soft.   Musculoskeletal:          General: Normal range of motion.   Skin:     General: Skin is warm.      Coloration: Skin is pale.   Neurological:      Mental Status: She is alert and oriented to person, place, and time.   Psychiatric:         Behavior: Behavior normal.          Results Review:     Imaging Results (Last 24 Hours)     Procedure Component Value Units Date/Time    XR Chest 1 View [[id number]] Collected: 09/12/22 0706     Updated: 09/12/22 0710    Narrative:         DATE OF EXAM:   9/11/2022 8:42 PM     PROCEDURE:   XR CHEST 1 VW-     INDICATIONS:   Dyspnea     COMPARISON:  08/28/2022     TECHNIQUE:   Portable chest radiograph.     FINDINGS:    Heart size within normal limits. Thickening of the interstitium which  may relate to chronic interstitial disease, stable from the prior study.  No superimposed consolidation. No pneumothorax or large pleural  effusion. Right shoulder prosthesis noted.       Impression:      1. No acute process.  2. Pulmonary interstitial thickening similar to prior study which may  relate to chronic interstitial lung disease.     Electronically Signed By-Napoleon Markham MD On:9/12/2022 7:08 AM  This report was finalized on [id number] by  Napoleon Markham MD.           Lab Results (last 24 hours)     Procedure Component Value Units Date/Time    Blood Gas, Arterial - [[id number]] Collected: 09/11/22 2325    Specimen: Arterial Blood Updated: 09/11/22 2328     Site Right Radial     Lex's Test Positive     pH, Arterial 7.439 pH units      pCO2, Arterial 37.3 mm Hg      pO2, Arterial 94.4 mm Hg      HCO3, Arterial 25.3 mmol/L      Base Excess, Arterial 1.2 mmol/L      Comment: Serial Number: [id number]:  [id number]        O2 Saturation, Arterial 97.6 %      CO2 Content 26.4 mmol/L      Barometric Pressure for Blood Gas --     Comment: N/A        Modality Cannula     FIO2 28 %      Hemodilution No    Respiratory Panel PCR w/COVID-19(SARS-CoV-2) [facility]/GRANT/LYN/PAD/[facility]/MAD/MARINA In-House, NP Swab in UTM/VTM, 3-4 HR TAT - Swab,  Nasopharynx [[id number]]  (Normal) Collected: 09/11/22 1955    Specimen: Swab from Nasopharynx Updated: 09/11/22 2047     ADENOVIRUS, PCR Not Detected     Coronavirus 229E Not Detected     Coronavirus HKU1 Not Detected     Coronavirus NL63 Not Detected     Coronavirus OC43 Not Detected     COVID19 Not Detected     Human Metapneumovirus Not Detected     Human Rhinovirus/Enterovirus Not Detected     Influenza A PCR Not Detected     Influenza B PCR Not Detected     Parainfluenza Virus 1 Not Detected     Parainfluenza Virus 2 Not Detected     Parainfluenza Virus 3 Not Detected     Parainfluenza Virus 4 Not Detected     RSV, PCR Not Detected     Bordetella pertussis pcr Not Detected     Bordetella parapertussis PCR Not Detected     Chlamydophila pneumoniae PCR Not Detected     Mycoplasma pneumo by PCR Not Detected    Narrative:      In the setting of a positive respiratory panel with a viral infection PLUS a negative procalcitonin without other underlying concern for bacterial infection, consider observing off antibiotics or discontinuation of antibiotics and continue supportive care. If the respiratory panel is positive for atypical bacterial infection (Bordetella pertussis, Chlamydophila pneumoniae, or Mycoplasma pneumoniae), consider antibiotic de-escalation to target atypical bacterial infection.    Urinalysis, Microscopic Only - Urine, Clean Catch [[id number]]  (Abnormal) Collected: 09/11/22 2016    Specimen: Urine, Clean Catch Updated: 09/11/22 2031     RBC, UA 0-2 /HPF      WBC, UA 0-2 /HPF      Bacteria, UA None Seen /HPF      Squamous Epithelial Cells, UA 0-2 /HPF      Hyaline Casts, UA 0-2 /LPF      Methodology Automated Microscopy    Urinalysis With Microscopic If Indicated (No Culture) - Urine, Clean Catch [[id number]]  (Abnormal) Collected: 09/11/22 2016    Specimen: Urine, Clean Catch Updated: 09/11/22 2031     Color, UA Yellow     Appearance, UA Clear     pH, UA <=5.0     Specific Gravity, UA 1.017      Glucose, UA Negative     Ketones, UA Trace     Bilirubin, UA Negative     Blood, UA Negative     Protein, UA Negative     Leuk Esterase, UA Trace     Nitrite, UA Negative     Urobilinogen, UA 0.2 E.U./dL    Comprehensive Metabolic Panel [[id number]]  (Abnormal) Collected: 09/11/22 1955    Specimen: Blood Updated: 09/11/22 2025     Glucose 136 mg/dL      BUN 10 mg/dL      Creatinine 0.71 mg/dL      Sodium 138 mmol/L      Potassium 4.3 mmol/L      Chloride 101 mmol/L      CO2 26.0 mmol/L      Calcium 9.4 mg/dL      Total Protein 7.1 g/dL      Albumin 3.90 g/dL      ALT (SGPT) 14 U/L      AST (SGOT) 16 U/L      Alkaline Phosphatase 104 U/L      Total Bilirubin 0.3 mg/dL      Globulin 3.2 gm/dL      A/G Ratio 1.2 g/dL      BUN/Creatinine Ratio 14.1     Anion Gap 11.0 mmol/L      eGFR 96.3 mL/min/1.73      Comment: National Kidney Foundation and American Society of Nephrology (ASN) Task Force recommended calculation based on the Chronic Kidney Disease Epidemiology Collaboration (CKD-EPI) equation refit without adjustment for race.       Narrative:      GFR Normal >60  Chronic Kidney Disease <60  Kidney Failure <15      Troponin [[id number]]  (Normal) Collected: 09/11/22 1955    Specimen: Blood Updated: 09/11/22 2025     Troponin T <0.010 ng/mL     Narrative:      Troponin T Reference Range:  <= 0.03 ng/mL-   Negative for AMI  >0.03 ng/mL-     Abnormal for myocardial necrosis.  Clinicians would have to utilize clinical acumen, EKG, Troponin and serial changes to determine if it is an Acute Myocardial Infarction or myocardial injury due to an underlying chronic condition.       Results may be falsely decreased if patient taking Biotin.      BNP [[id number]]  (Normal) Collected: 09/11/22 1955    Specimen: Blood Updated: 09/11/22 2023     proBNP 71.8 pg/mL     Narrative:      Among patients with dyspnea, NT-proBNP is highly sensitive for the detection of acute congestive heart failure. In addition NT-proBNP of <300 pg/ml  effectively rules out acute congestive heart failure with 99% negative predictive value.    Results may be falsely decreased if patient taking Biotin.      D-dimer, Quantitative [[id number]]  (Normal) Collected: 09/11/22 1955    Specimen: Blood Updated: 09/11/22 2012     D-Dimer, Quantitative 0.32 mg/L (FEU)     Narrative:      Reference Range  --------------------------------------------------------------------     < 0.50   Negative Predictive Value  0.50-0.59   Indeterminate    >= 0.60   Probable VTE             A very low percentage of patients with DVT may yield D-Dimer results   below the cut-off of 0.50 mg/L FEU.  This is known to be more   prevalent in patients with distal DVT.             Results of this test should always be interpreted in conjunction with   the patient's medical history, clinical presentation and other   findings.  Clinical diagnosis should not be based on the result of   [place] D-Dimer alone.    CBC & Differential [[id number]]  (Abnormal) Collected: 09/11/22 1955    Specimen: Blood Updated: 09/11/22 2001    Narrative:      The following orders were created for panel order CBC & Differential.  Procedure                               Abnormality         Status                     ---------                               -----------         ------                     CBC Auto Differential[[id number]]        Abnormal            Final result                 Please view results for these tests on the individual orders.    CBC Auto Differential [[id number]]  (Abnormal) Collected: 09/11/22 1955    Specimen: Blood Updated: 09/11/22 2001     WBC 6.20 10*3/mm3      RBC 4.52 10*6/mm3      Hemoglobin 12.9 g/dL      Hematocrit 38.7 %      MCV 85.8 fL      MCH 28.5 pg      MCHC 33.2 g/dL      RDW 15.9 %      RDW-SD 48.6 fl      MPV 7.8 fL      Platelets 279 10*3/mm3      Neutrophil % 69.1 %      Lymphocyte % 15.0 %      Monocyte % 11.5 %      Eosinophil % 3.6 %      Basophil % 0.8 %      Neutrophils,  Absolute 4.30 10*3/mm3      Lymphocytes, Absolute 0.90 10*3/mm3      Monocytes, Absolute 0.70 10*3/mm3      Eosinophils, Absolute 0.20 10*3/mm3      Basophils, Absolute 0.00 10*3/mm3      nRBC 0.1 /100 WBC            I reviewed the patient's new clinical results.    Assessment & Plan     Dyspnea  Interstitial lung disease  -Pulmonary consult  -singular/nebs/steroids    RA  -hold medication for now  HTN-home med   Mood disorder  -continue home medication     Diet: HH  DVT prophylaxis: lovenox  GI prophylaxis: pepcid  Code status: full    I discussed the patient's findings and my recommendations with patient.     Carolyn Maddox, APRN  09/12/22  07:57 EDT

## 2022-09-12 NOTE — PLAN OF CARE
Goal Outcome Evaluation:  Plan of Care Reviewed With: patient        Progress: improving  Outcome Evaluation: Pt seen by PT this morning. Continues on 2L O2. Pt has dry non productive cough. Pt has no complaints at this time.

## 2022-09-12 NOTE — CONSULTS
Group: [facility]         CONSULT NOTE    Patient Identification:  Mariel Parker  62 y.o.  female  1960  [id number]            Requesting physician: Attending physician    Reason for Consultation:   Dyspnea      History of Present Illness:  62 y.o. female who presents with past medical history hypertension, interstitial lung disease, rheumatoid arthritis, GERD, and history of COVID-19 which she was hospitalized in January 2022 and again had COVID in July without hospitalization.  She does have home oxygen as needed.  She states that she has had a cough since her first COVID.    Assessment:    Acute interstitial lung disease exacerbation   interstitial lung disease seen on CAT scan in May 2021 has slightly progressed on repeat CAT scan 9/10/2022  Hosp f/u COVID 1-2022, completed remdesivir, steroid.  Bronch with no growth.   chronic cough and worsening SOB and chronic cough s/p covid-19 July 4th. Can taste a bad taste after coughing. Sputum white to greyish color. Currently on Rituxan per rheumatology.   Denies a fever but states she has difficulty walking short distances now and is using home oxygen more.  ECHO- EF 60%, rvsp 25.8      BETTY, AHI 21.9   history of recurrent bronchitis    CT scan October 2018 stable appearance of mild interstitial lung disease possible RA  bronchoscopy 08/03/2018 bacteria negative AFP negative fungal few penicillium species  bronchoscopy 03/02/2018 showed Pseudomonas sensitive to all antibiotics,    10 days of Cipro p.o.  tobramycin nebulizer 300 mg b.i.d. for 10 days   reactive airway disease and inflammation noted during bronchoscopy   AFB is negative after 43 days,  fungal is negative,  PCPs negative  Interstitial lung disease CT scan of the chest 02/18/2018 showed ground-glass alveolar lung disease with micronodular rule out infection  Patient had cough-vomit syndrome,  received 10 days of azithromycin  Bronchoscopy 08/16/2017, grew streptococcal pneumonia  AFB  and fungal is negative Cytomegalovirus and PCP PCR are negative  Viral panel is negative  Cough and SOA, initially improved with symbicort.  Still has residual cough  History of RA on Humira, Plaquenil, off methotrexate  on prednisone 20 mg daily,  Chest x-ray May 2017 showed increased marking in the right upper lobe   ct chest 8/2016, mildly prominent interstitial markings in the periphery of the upper and lower lobes, and right middle lobe which are chronic. centrilobular and ground-glass opacities  predominantly in the lower lobes  Pulmonary Function Test  June 2019 FEV1 54% total lung capacity 61% DLCO 46% which has not changed much from 2017 but was a change from August 2016 showed FEV1 85% to 90% predicted total lung capacity 67% predicted and the RV 15% predicted diffusion capacity 78% predicted  Most recent pulmonary function test June 2017 showed FEV1 54-55% predicted which is significant decline over 12 months from 90%  Total lung capacity 61% predicted, diffusion capacity 46% predicted which is significant decline from 78% predicted last year  Features of obstructive sleep apnea  Hypertension most likely related to underlying obstructive sleep apnea and diagnosed untreated      Recommendations:    Steroids Solu-Medrol 40 mg IV every 8  Antibiotics Unasyn     Discussed with patient initiating Ofev as an outpatient for lung fibrosis    CT chest at priority radiology 9/10/2022    Review of Sytems:  Review of Systems   Respiratory: Positive for cough and shortness of breath.        Past Medical History:  Past Medical History:   Diagnosis Date   • GERD (gastroesophageal reflux disease)    • Hypertension    • Interstitial lung disease (HCC)    • Intraocular pressure increase, bilateral    • Rheumatoid arthritis (HCC)        Past Surgical History:  Past Surgical History:   Procedure Laterality Date   • ACHILLES TENDON SURGERY  2009   • APPENDECTOMY  1999   • BRONCHOSCOPY N/A 1/29/2022    Procedure: BRONCHOSCOPY  with lung washing;  Surgeon: Dana Garcia MD;  Location: [facility];  Service: Pulmonary;  Laterality: N/A;  post op: pneumonia   • CARPAL TUNNEL RELEASE Right 1994   • TOTAL SHOULDER REVERSE ARTHROPLASTY Right 06/05/2018   • TUBAL ABDOMINAL LIGATION  2004        Home Meds:  Medications Prior to Admission   Medication Sig Dispense Refill Last Dose   • acetaminophen (TYLENOL) 650 MG 8 hr tablet Take 650 mg by mouth Every 8 (Eight) Hours As Needed for Mild Pain .   Patient Taking Differently at Unknown time   • albuterol (PROVENTIL) (2.5 MG/3ML) 0.083% nebulizer solution Take 2.5 mg by nebulization Every 6 (Six) Hours As Needed for Wheezing or Shortness of Air.   9/11/2022 at Unknown time   • albuterol sulfate HFA (ProAir HFA) 108 (90 Base) MCG/ACT inhaler Inhale 2 puffs 4 (Four) Times a Day. 1 g 0 9/11/2022 at 1100   • ascorbic acid (VITAMIN C) 500 MG tablet Take 1 tablet by mouth Daily. 30 tablet 0 9/11/2022 at Unknown time   • B Complex Vitamins (VITAMIN B COMPLEX) capsule capsule Take 1 capsule by mouth Daily.   9/11/2022 at Unknown time   • benzonatate (Tessalon Perles) 100 MG capsule Take 100 mg by mouth 3 (Three) Times a Day As Needed for Cough.   9/11/2022 at 1400   • Calcium Citrate-Vitamin D 200-125 MG-UNIT tablet Take 1 tablet by mouth Daily.   9/11/2022 at Unknown time   • citalopram (CeleXA) 40 MG tablet Take 1 tablet by mouth Every Morning. 90 tablet 2 9/11/2022 at Unknown time   • famotidine (PEPCID) 40 MG tablet Take 40 mg by mouth Daily.   9/11/2022 at Unknown time   • fexofenadine (ALLEGRA) 180 MG tablet Take 180 mg by mouth Daily.   9/11/2022 at Unknown time   • fluticasone (FLONASE) 50 MCG/ACT nasal spray 2 sprays into the nostril(s) as directed by provider Daily.   9/11/2022 at Unknown time   • fluticasone-salmeterol (ADVAIR) 500-50 MCG/DOSE DISKUS Inhale 1 puff 2 (Two) Times a Day.   9/11/2022 at Unknown time   • guaiFENesin (MUCINEX) 600 MG 12 hr tablet Take 1,200 mg by mouth 2 (Two) Times  "a Day.   9/11/2022 at 1030   • losartan (Cozaar) 100 MG tablet Take 1 tablet by mouth Daily. 90 tablet 2 9/11/2022 at Unknown time   • montelukast (SINGULAIR) 10 MG tablet Take 10 mg by mouth Every Night.   Past Week at Unknown time   • Multiple Vitamins-Minerals (EYE VITAMINS & MINERALS) tablet Take 2 tablets by mouth Daily.   9/11/2022 at Unknown time   • pantoprazole (PROTONIX) 40 MG EC tablet Take 40 mg by mouth Daily.   9/11/2022 at Unknown time   • predniSONE (DELTASONE) 5 MG tablet Take 5 mg by mouth Daily.   9/11/2022 at 0900   • zinc sulfate (ZINCATE) 220 (50 Zn) MG capsule Take 1 capsule by mouth Daily. 30 capsule 0 Past Week at Unknown time   • riTUXimab (Rituxan) 100 MG/10ML solution injection Inject 100 mg as directed Every 6 (Six) Months. Next dose due December 2022   More than a month at Unknown time   • traMADol (ULTRAM) 50 MG tablet TAKE 2 TABLETS BY MOUTH TWICE DAILY AS NEEDED   More than a month at Unknown time       Allergies:  Allergies   Allergen Reactions   • Oxycodone-Acetaminophen Rash       Social History:   Social History     Socioeconomic History   • Marital status:    Tobacco Use   • Smoking status: Never Smoker   • Smokeless tobacco: Never Used   Vaping Use   • Vaping Use: Never used   Substance and Sexual Activity   • Alcohol use: Yes     Comment: wine occasionally   • Drug use: No   • Sexual activity: Defer       Family History:  Family History   Problem Relation Age of Onset   • Hypertension Mother    • Heart disease Mother    • COPD Mother    • Hypertension Father    • Diabetes Father    • Stroke Father        Physical Exam:  /80 (BP Location: Right arm, Patient Position: Lying)   Pulse 96   Temp 98.1 °F (36.7 °C) (Oral)   Resp 16   Ht 154.9 cm (61\")   Wt 104 kg (228 lb 2.8 oz)   SpO2 91%   BMI 43.11 kg/m²  Body mass index is 43.11 kg/m². 91% 104 kg (228 lb 2.8 oz)  Physical Exam  Pulmonary:      Breath sounds: Rhonchi and rales present.         LABS:  Lab " Results   Component Value Date    CALCIUM 9.4 09/11/2022     Results from last 7 days   Lab Units 09/11/22 1955   SODIUM mmol/L 138   POTASSIUM mmol/L 4.3   CHLORIDE mmol/L 101   CO2 mmol/L 26.0   BUN mg/dL 10   CREATININE mg/dL 0.71   GLUCOSE mg/dL 136*   CALCIUM mg/dL 9.4   WBC 10*3/mm3 6.20   HEMOGLOBIN g/dL 12.9   PLATELETS 10*3/mm3 279   ALT (SGPT) U/L 14   AST (SGOT) U/L 16   PROBNP pg/mL 71.8     Lab Results   Component Value Date    CKTOTAL 70 02/15/2017    TROPONINT <0.010 09/11/2022     Results from last 7 days   Lab Units 09/11/22 1955   TROPONIN T ng/mL <0.010             Results from last 7 days   Lab Units 09/11/22 2325   PH, ARTERIAL pH units 7.439   PCO2, ARTERIAL mm Hg 37.3   PO2 ART mm Hg 94.4   O2 SATURATION ART % 97.6   MODALITY  Cannula     Results from last 7 days   Lab Units 09/11/22 1955   ADENOVIRUS DETECTION BY PCR  Not Detected   CORONAVIRUS 229E  Not Detected   CORONAVIRUS HKU1  Not Detected   CORONAVIRUS NL63  Not Detected   CORONAVIRUS OC43  Not Detected   HUMAN METAPNEUMOVIRUS  Not Detected   HUMAN RHINOVIRUS/ENTEROVIRUS  Not Detected   INFLUENZA B PCR  Not Detected   PARAINFLUENZA 1  Not Detected   PARAINFLUENZA VIRUS 2  Not Detected   PARAINFLUENZA VIRUS 3  Not Detected   PARAINFLUENZA VIRUS 4  Not Detected   BORDETELLA PERTUSSIS PCR  Not Detected   CHLAMYDOPHILA PNEUMONIAE PCR  Not Detected   MYCOPLAMA PNEUMO PCR  Not Detected   INFLUENZA A PCR  Not Detected   RSV, PCR  Not Detected             Lab Results   Component Value Date    TSH 2.94 02/15/2017     Estimated Creatinine Clearance: 91.2 mL/min (by C-G formula based on SCr of 0.71 mg/dL).  Results from last 7 days   Lab Units 09/11/22  2016   NITRITE UA  Negative   WBC UA /HPF 0-2*   BACTERIA UA /HPF None Seen   SQUAM EPITHEL UA /HPF 0-2        Imaging:  Imaging Results (Last 24 Hours)     Procedure Component Value Units Date/Time    XR Chest 1 View [[id number]] Collected: 09/12/22 0706     Updated: 09/12/22 0710     Narrative:         DATE OF EXAM:   9/11/2022 8:42 PM     PROCEDURE:   XR CHEST 1 VW-     INDICATIONS:   Dyspnea     COMPARISON:  08/28/2022     TECHNIQUE:   Portable chest radiograph.     FINDINGS:    Heart size within normal limits. Thickening of the interstitium which  may relate to chronic interstitial disease, stable from the prior study.  No superimposed consolidation. No pneumothorax or large pleural  effusion. Right shoulder prosthesis noted.       Impression:      1. No acute process.  2. Pulmonary interstitial thickening similar to prior study which may  relate to chronic interstitial lung disease.     Electronically Signed By-Napoleon Markham MD On:9/12/2022 7:08 AM  This report was finalized on [id number] by  Napoleon Markham MD.            Current Meds:   SCHEDULE  cetirizine, 5 mg, Oral, Daily  citalopram, 40 mg, Oral, Daily  enoxaparin, 40 mg, Subcutaneous, BID  famotidine, 40 mg, Oral, Daily  ipratropium-albuterol, 3 mL, Nebulization, Q6H While Awake - RT  losartan, 100 mg, Oral, Daily  methylPREDNISolone sodium succinate, 40 mg, Intravenous, Q8H  montelukast, 10 mg, Oral, Nightly  pantoprazole, 40 mg, Oral, Daily  sodium chloride, 3 mL, Intravenous, Q12H      Infusions     PRNs  •  benzonatate  •  hydrALAZINE  •  HYDROcodone Bit-Homatrop MBr  •  ibuprofen  •  melatonin  •  ondansetron **OR** ondansetron  •  [COMPLETED] Insert peripheral IV **AND** sodium chloride  •  sodium chloride  •  traMADol        Dana Garcia MD  9/12/2022  15:30 EDT      Much of this encounter note is an electronic transcription/translation of spoken language to printed text using Dragon Software.

## 2022-09-13 LAB
ANION GAP SERPL CALCULATED.3IONS-SCNC: 11 MMOL/L (ref 5–15)
BASOPHILS # BLD AUTO: 0 10*3/MM3 (ref 0–0.2)
BASOPHILS NFR BLD AUTO: 0.5 % (ref 0–1.5)
BUN SERPL-MCNC: 15 MG/DL (ref 8–23)
BUN/CREAT SERPL: 23.1 (ref 7–25)
CALCIUM SPEC-SCNC: 9.3 MG/DL (ref 8.6–10.5)
CHLORIDE SERPL-SCNC: 103 MMOL/L (ref 98–107)
CO2 SERPL-SCNC: 25 MMOL/L (ref 22–29)
CREAT SERPL-MCNC: 0.65 MG/DL (ref 0.57–1)
DEPRECATED RDW RBC AUTO: 48.6 FL (ref 37–54)
EGFRCR SERPLBLD CKD-EPI 2021: 99.7 ML/MIN/1.73
EOSINOPHIL # BLD AUTO: 0 10*3/MM3 (ref 0–0.4)
EOSINOPHIL NFR BLD AUTO: 0.1 % (ref 0.3–6.2)
ERYTHROCYTE [DISTWIDTH] IN BLOOD BY AUTOMATED COUNT: 15.8 % (ref 12.3–15.4)
GLUCOSE SERPL-MCNC: 154 MG/DL (ref 65–99)
HCT VFR BLD AUTO: 38.7 % (ref 34–46.6)
HGB BLD-MCNC: 12.6 G/DL (ref 12–15.9)
LYMPHOCYTES # BLD AUTO: 0.5 10*3/MM3 (ref 0.7–3.1)
LYMPHOCYTES NFR BLD AUTO: 7.4 % (ref 19.6–45.3)
MCH RBC QN AUTO: 28.2 PG (ref 26.6–33)
MCHC RBC AUTO-ENTMCNC: 32.7 G/DL (ref 31.5–35.7)
MCV RBC AUTO: 86.3 FL (ref 79–97)
MONOCYTES # BLD AUTO: 0.2 10*3/MM3 (ref 0.1–0.9)
MONOCYTES NFR BLD AUTO: 4 % (ref 5–12)
NEUTROPHILS NFR BLD AUTO: 5.5 10*3/MM3 (ref 1.7–7)
NEUTROPHILS NFR BLD AUTO: 88 % (ref 42.7–76)
NRBC BLD AUTO-RTO: 0.1 /100 WBC (ref 0–0.2)
PLATELET # BLD AUTO: 261 10*3/MM3 (ref 140–450)
PMV BLD AUTO: 8.4 FL (ref 6–12)
POTASSIUM SERPL-SCNC: 4.6 MMOL/L (ref 3.5–5.2)
RBC # BLD AUTO: 4.48 10*6/MM3 (ref 3.77–5.28)
SODIUM SERPL-SCNC: 139 MMOL/L (ref 136–145)
WBC NRBC COR # BLD: 6.2 10*3/MM3 (ref 3.4–10.8)

## 2022-09-13 PROCEDURE — 86003 ALLG SPEC IGE CRUDE XTRC EA: CPT | Performed by: INTERNAL MEDICINE

## 2022-09-13 PROCEDURE — 25010000002 AMPICILLIN-SULBACTAM PER 1.5 G: Performed by: INTERNAL MEDICINE

## 2022-09-13 PROCEDURE — 96376 TX/PRO/DX INJ SAME DRUG ADON: CPT

## 2022-09-13 PROCEDURE — 25010000002 METHYLPREDNISOLONE PER 40 MG: Performed by: NURSE PRACTITIONER

## 2022-09-13 PROCEDURE — 85025 COMPLETE CBC W/AUTO DIFF WBC: CPT | Performed by: NURSE PRACTITIONER

## 2022-09-13 PROCEDURE — 94799 UNLISTED PULMONARY SVC/PX: CPT

## 2022-09-13 PROCEDURE — G0378 HOSPITAL OBSERVATION PER HR: HCPCS

## 2022-09-13 PROCEDURE — 96372 THER/PROPH/DIAG INJ SC/IM: CPT

## 2022-09-13 PROCEDURE — 80048 BASIC METABOLIC PNL TOTAL CA: CPT | Performed by: NURSE PRACTITIONER

## 2022-09-13 PROCEDURE — 25010000002 ENOXAPARIN PER 10 MG: Performed by: NURSE PRACTITIONER

## 2022-09-13 PROCEDURE — 94761 N-INVAS EAR/PLS OXIMETRY MLT: CPT

## 2022-09-13 PROCEDURE — 94664 DEMO&/EVAL PT USE INHALER: CPT

## 2022-09-13 PROCEDURE — 82785 ASSAY OF IGE: CPT | Performed by: INTERNAL MEDICINE

## 2022-09-13 RX ADMIN — CITALOPRAM HYDROBROMIDE 40 MG: 20 TABLET ORAL at 08:12

## 2022-09-13 RX ADMIN — FAMOTIDINE 40 MG: 20 TABLET, FILM COATED ORAL at 08:12

## 2022-09-13 RX ADMIN — METHYLPREDNISOLONE SODIUM SUCCINATE 40 MG: 40 INJECTION, POWDER, FOR SOLUTION INTRAMUSCULAR; INTRAVENOUS at 10:31

## 2022-09-13 RX ADMIN — MONTELUKAST 10 MG: 10 TABLET, FILM COATED ORAL at 20:25

## 2022-09-13 RX ADMIN — ENOXAPARIN SODIUM 40 MG: 100 INJECTION SUBCUTANEOUS at 08:12

## 2022-09-13 RX ADMIN — CETIRIZINE HYDROCHLORIDE 5 MG: 10 TABLET, FILM COATED ORAL at 08:12

## 2022-09-13 RX ADMIN — IPRATROPIUM BROMIDE AND ALBUTEROL SULFATE 3 ML: .5; 3 SOLUTION RESPIRATORY (INHALATION) at 18:30

## 2022-09-13 RX ADMIN — AMPICILLIN SODIUM AND SULBACTAM SODIUM 3 G: 2; 1 INJECTION, POWDER, FOR SOLUTION INTRAMUSCULAR; INTRAVENOUS at 20:25

## 2022-09-13 RX ADMIN — METHYLPREDNISOLONE SODIUM SUCCINATE 40 MG: 40 INJECTION, POWDER, FOR SOLUTION INTRAMUSCULAR; INTRAVENOUS at 18:34

## 2022-09-13 RX ADMIN — METHYLPREDNISOLONE SODIUM SUCCINATE 40 MG: 40 INJECTION, POWDER, FOR SOLUTION INTRAMUSCULAR; INTRAVENOUS at 02:21

## 2022-09-13 RX ADMIN — ENOXAPARIN SODIUM 40 MG: 100 INJECTION SUBCUTANEOUS at 20:25

## 2022-09-13 RX ADMIN — LOSARTAN POTASSIUM 100 MG: 50 TABLET, FILM COATED ORAL at 08:12

## 2022-09-13 RX ADMIN — TRAMADOL HYDROCHLORIDE 50 MG: 50 TABLET, COATED ORAL at 21:00

## 2022-09-13 RX ADMIN — IPRATROPIUM BROMIDE AND ALBUTEROL SULFATE 3 ML: .5; 3 SOLUTION RESPIRATORY (INHALATION) at 13:22

## 2022-09-13 RX ADMIN — HYDROCODONE BITARTRATE AND HOMATROPINE METHYLBROMIDE 5 ML: 1.5; 5 SYRUP ORAL at 18:34

## 2022-09-13 RX ADMIN — Medication 3 ML: at 20:35

## 2022-09-13 RX ADMIN — HYDROCODONE BITARTRATE AND HOMATROPINE METHYLBROMIDE 5 ML: 1.5; 5 SYRUP ORAL at 10:32

## 2022-09-13 RX ADMIN — AMPICILLIN SODIUM AND SULBACTAM SODIUM 3 G: 2; 1 INJECTION, POWDER, FOR SOLUTION INTRAMUSCULAR; INTRAVENOUS at 12:07

## 2022-09-13 RX ADMIN — IPRATROPIUM BROMIDE AND ALBUTEROL SULFATE 3 ML: .5; 3 SOLUTION RESPIRATORY (INHALATION) at 08:44

## 2022-09-13 RX ADMIN — PANTOPRAZOLE SODIUM 40 MG: 40 TABLET, DELAYED RELEASE ORAL at 08:12

## 2022-09-13 RX ADMIN — Medication 3 ML: at 08:16

## 2022-09-13 RX ADMIN — AMPICILLIN SODIUM AND SULBACTAM SODIUM 3 G: 2; 1 INJECTION, POWDER, FOR SOLUTION INTRAMUSCULAR; INTRAVENOUS at 04:52

## 2022-09-13 NOTE — PROGRESS NOTES
LOS: 1 day   Patient Care Team:  Valerio George MD as PCP - General (Family Medicine)    Subjective         Review of Systems   Constitutional: Positive for activity change and fatigue.   HENT: Negative.    Respiratory: Positive for shortness of breath.    Cardiovascular: Negative.    Gastrointestinal: Negative.    Genitourinary: Negative.    Musculoskeletal: Negative.    Neurological: Positive for weakness.   Psychiatric/Behavioral: Negative.            Objective     Vital Signs  Temp:  [97.7 °F (36.5 °C)-98.4 °F (36.9 °C)] 98 °F (36.7 °C)  Heart Rate:  [] 88  Resp:  [16-20] 16  BP: (133-156)/(70-80) 156/74      Physical Exam  Constitutional:       Appearance: She is obese. She is not ill-appearing.   HENT:      Head: Normocephalic and atraumatic.      Right Ear: External ear normal.      Left Ear: External ear normal.      Nose: Nose normal.      Mouth/Throat:      Mouth: Mucous membranes are moist.   Eyes:      General:         Right eye: No discharge.         Left eye: No discharge.   Cardiovascular:      Rate and Rhythm: Normal rate and regular rhythm.      Pulses: Normal pulses.      Heart sounds: Normal heart sounds.   Pulmonary:      Effort: Pulmonary effort is normal.      Breath sounds: Normal breath sounds.   Abdominal:      General: Bowel sounds are normal.      Palpations: Abdomen is soft.   Musculoskeletal:         General: Normal range of motion.      Cervical back: Normal range of motion.   Skin:     General: Skin is warm.   Neurological:      Mental Status: She is alert and oriented to person, place, and time.   Psychiatric:         Behavior: Behavior normal.              Results Review:    Lab Results (last 24 hours)     Procedure Component Value Units Date/Time    Basic Metabolic Panel [[id number]]  (Abnormal) Collected: 09/13/22 0017    Specimen: Blood Updated: 09/13/22 0117     Glucose 154 mg/dL      BUN 15 mg/dL      Creatinine 0.65 mg/dL      Sodium 139 mmol/L      Potassium 4.6  mmol/L      Chloride 103 mmol/L      CO2 25.0 mmol/L      Calcium 9.3 mg/dL      BUN/Creatinine Ratio 23.1     Anion Gap 11.0 mmol/L      eGFR 99.7 mL/min/1.73      Comment: National Kidney Foundation and American Society of Nephrology (ASN) Task Force recommended calculation based on the Chronic Kidney Disease Epidemiology Collaboration (CKD-EPI) equation refit without adjustment for race.       Narrative:      GFR Normal >60  Chronic Kidney Disease <60  Kidney Failure <15      CBC & Differential [[id number]]  (Abnormal) Collected: 09/13/22 0017    Specimen: Blood Updated: 09/13/22 0057    Narrative:      The following orders were created for panel order CBC & Differential.  Procedure                               Abnormality         Status                     ---------                               -----------         ------                     CBC Auto Differential[[id number]]        Abnormal            Final result                 Please view results for these tests on the individual orders.    CBC Auto Differential [[id number]]  (Abnormal) Collected: 09/13/22 0017    Specimen: Blood Updated: 09/13/22 0057     WBC 6.20 10*3/mm3      RBC 4.48 10*6/mm3      Hemoglobin 12.6 g/dL      Hematocrit 38.7 %      MCV 86.3 fL      MCH 28.2 pg      MCHC 32.7 g/dL      RDW 15.8 %      RDW-SD 48.6 fl      MPV 8.4 fL      Platelets 261 10*3/mm3      Neutrophil % 88.0 %      Lymphocyte % 7.4 %      Monocyte % 4.0 %      Eosinophil % 0.1 %      Basophil % 0.5 %      Neutrophils, Absolute 5.50 10*3/mm3      Lymphocytes, Absolute 0.50 10*3/mm3      Monocytes, Absolute 0.20 10*3/mm3      Eosinophils, Absolute 0.00 10*3/mm3      Basophils, Absolute 0.00 10*3/mm3      nRBC 0.1 /100 WBC     IgE + Allergens (35) [[id number]] Collected: 09/13/22 0017    Specimen: Blood Updated: 09/13/22 0052           Imaging Results (Last 24 Hours)     ** No results found for the last 24 hours. **               I reviewed the patient's new clinical  results.    Medication Review:   Scheduled Meds:ampicillin-sulbactam, 3 g, Intravenous, Q8H  cetirizine, 5 mg, Oral, Daily  citalopram, 40 mg, Oral, Daily  enoxaparin, 40 mg, Subcutaneous, BID  famotidine, 40 mg, Oral, Daily  ipratropium-albuterol, 3 mL, Nebulization, Q6H While Awake - RT  losartan, 100 mg, Oral, Daily  methylPREDNISolone sodium succinate, 40 mg, Intravenous, Q8H  montelukast, 10 mg, Oral, Nightly  pantoprazole, 40 mg, Oral, Daily  sodium chloride, 3 mL, Intravenous, Q12H      Continuous Infusions:   PRN Meds:.benzonatate  •  hydrALAZINE  •  HYDROcodone Bit-Homatrop MBr  •  ibuprofen  •  melatonin  •  ondansetron **OR** ondansetron  •  [COMPLETED] Insert peripheral IV **AND** sodium chloride  •  sodium chloride  •  traMADol     Interval History:    Assessment & Plan     Dyspnea  Interstitial lung disease  -Pulmonary following  -singular/nebs/steroids/unasyn  -pulm to start IgE      RA  -hold medication for now  HTN-home med   Mood disorder  -continue home medication     Diet: HH  DVT prophylaxis: lovenox  GI prophylaxis: pepcid  Code status: full    Plan for disposition:RAMÍREZ Maddox, APRN 09/13/22  11:38 EDT

## 2022-09-13 NOTE — PROGRESS NOTES
Daily Progress Note        Shortness of breath      Assessment    Acute interstitial lung disease exacerbation   interstitial lung disease seen on CAT scan in May 2021 has slightly progressed on repeat CAT scan 9/10/2022  Hosp f/u COVID 1-2022, completed remdesivir, steroid.  Bronch with no growth.   chronic cough and worsening SOB and chronic cough s/p covid-19 July 4th. Can taste a bad taste after coughing. Sputum white to greyish color. Currently on Rituxan per rheumatology.   Denies a fever but states she has difficulty walking short distances now and is using home oxygen more.  ECHO- EF 60%, rvsp 25.8        BTETY, AHI 21.9   history of recurrent bronchitis    CT scan October 2018 stable appearance of mild interstitial lung disease possible RA  bronchoscopy 08/03/2018 bacteria negative AFP negative fungal few penicillium species  bronchoscopy 03/02/2018 showed Pseudomonas sensitive to all antibiotics,    10 days of Cipro p.o.  tobramycin nebulizer 300 mg b.i.d. for 10 days   reactive airway disease and inflammation noted during bronchoscopy   AFB is negative after 43 days,  fungal is negative,  PCPs negative  Interstitial lung disease CT scan of the chest 02/18/2018 showed ground-glass alveolar lung disease with micronodular rule out infection  Patient had cough-vomit syndrome,  received 10 days of azithromycin  Bronchoscopy 08/16/2017, grew streptococcal pneumonia  AFB and fungal is negative Cytomegalovirus and PCP PCR are negative  Viral panel is negative  Cough and SOA, initially improved with symbicort.  Still has residual cough  History of RA on Humira, Plaquenil, off methotrexate  on prednisone 20 mg daily,  Chest x-ray May 2017 showed increased marking in the right upper lobe   ct chest 8/2016, mildly prominent interstitial markings in the periphery of the upper and lower lobes, and right middle lobe which are chronic. centrilobular and ground-glass opacities  predominantly in the lower lobes  Pulmonary  Function Test  June 2019 FEV1 54% total lung capacity 61% DLCO 46% which has not changed much from 2017 but was a change from August 2016 showed FEV1 85% to 90% predicted total lung capacity 67% predicted and the RV 15% predicted diffusion capacity 78% predicted  Most recent pulmonary function test June 2017 showed FEV1 54-55% predicted which is significant decline over 12 months from 90%  Total lung capacity 61% predicted, diffusion capacity 46% predicted which is significant decline from 78% predicted last year  Features of obstructive sleep apnea  Hypertension most likely related to underlying obstructive sleep apnea and diagnosed untreated        Recommendations:     IgE with zone 8 has been ordered    Steroids Solu-Medrol 40 mg IV every 8  Antibiotics Unasyn     DuoNeb  DVT prophylaxis Lovenox  GI prophylaxis Pepcid and Protonix     Discussed with patient initiating Ofev as an outpatient for lung fibrosis     CT chest at priority radiology 9/10/2022       LOS: 1 day     Subjective         Objective     Vital signs for last 24 hours:  Vitals:    09/12/22 1929 09/12/22 1940 09/12/22 2322 09/13/22 0358   BP:  149/78 137/70 133/76   BP Location:  Right arm Right arm Right arm   Patient Position:  Lying Lying Lying   Pulse: 91 100 83 85   Resp: 20 20 18 16   Temp:  98.2 °F (36.8 °C) 98.4 °F (36.9 °C) 97.7 °F (36.5 °C)   TempSrc:  Oral Oral Oral   SpO2: 98% 93% 98% 95%   Weight:    103 kg (226 lb 10.1 oz)   Height:           Intake/Output last 3 shifts:  I/O last 3 completed shifts:  In: 120 [P.O.:120]  Out: -   Intake/Output this shift:  I/O this shift:  In: 240 [P.O.:240]  Out: 1100 [Urine:1100]      Radiology  Imaging Results (Last 24 Hours)     Procedure Component Value Units Date/Time    XR Chest 1 View [[id number]] Collected: 09/12/22 0706     Updated: 09/12/22 0710    Narrative:         DATE OF EXAM:   9/11/2022 8:42 PM     PROCEDURE:   XR CHEST 1 VW-     INDICATIONS:   Dyspnea     COMPARISON:  08/28/2022      TECHNIQUE:   Portable chest radiograph.     FINDINGS:    Heart size within normal limits. Thickening of the interstitium which  may relate to chronic interstitial disease, stable from the prior study.  No superimposed consolidation. No pneumothorax or large pleural  effusion. Right shoulder prosthesis noted.       Impression:      1. No acute process.  2. Pulmonary interstitial thickening similar to prior study which may  relate to chronic interstitial lung disease.     Electronically Signed By-Napoleon Markham MD On:9/12/2022 7:08 AM  This report was finalized on [id number] by  Napoleon Markham MD.          Labs:  Results from last 7 days   Lab Units 09/13/22  0017   WBC 10*3/mm3 6.20   HEMOGLOBIN g/dL 12.6   HEMATOCRIT % 38.7   PLATELETS 10*3/mm3 261     Results from last 7 days   Lab Units 09/13/22 0017 09/11/22 1955   SODIUM mmol/L 139 138   POTASSIUM mmol/L 4.6 4.3   CHLORIDE mmol/L 103 101   CO2 mmol/L 25.0 26.0   BUN mg/dL 15 10   CREATININE mg/dL 0.65 0.71   CALCIUM mg/dL 9.3 9.4   BILIRUBIN mg/dL  --  0.3   ALK PHOS U/L  --  104   ALT (SGPT) U/L  --  14   AST (SGOT) U/L  --  16   GLUCOSE mg/dL 154* 136*     Results from last 7 days   Lab Units 09/11/22  2325   PH, ARTERIAL pH units 7.439   PO2 ART mm Hg 94.4   PCO2, ARTERIAL mm Hg 37.3   HCO3 ART mmol/L 25.3     Results from last 7 days   Lab Units 09/11/22 1955   ALBUMIN g/dL 3.90     Results from last 7 days   Lab Units 09/11/22 1955   TROPONIN T ng/mL <0.010                           Meds:   SCHEDULE  ampicillin-sulbactam, 3 g, Intravenous, Q8H  cetirizine, 5 mg, Oral, Daily  citalopram, 40 mg, Oral, Daily  enoxaparin, 40 mg, Subcutaneous, BID  famotidine, 40 mg, Oral, Daily  ipratropium-albuterol, 3 mL, Nebulization, Q6H While Awake - RT  losartan, 100 mg, Oral, Daily  methylPREDNISolone sodium succinate, 40 mg, Intravenous, Q8H  montelukast, 10 mg, Oral, Nightly  pantoprazole, 40 mg, Oral, Daily  sodium chloride, 3 mL, Intravenous,  Q12H      Infusions     PRNs  benzonatate  •  hydrALAZINE  •  HYDROcodone Bit-Homatrop MBr  •  ibuprofen  •  melatonin  •  ondansetron **OR** ondansetron  •  [COMPLETED] Insert peripheral IV **AND** sodium chloride  •  sodium chloride  •  traMADol    Physical Exam:  Physical Exam  Vitals reviewed.   Pulmonary:      Breath sounds: Wheezing and rhonchi present.   Skin:     General: Skin is warm and dry.   Neurological:      Mental Status: She is alert and oriented to person, place, and time.         ROS  Review of Systems   Respiratory: Positive for cough and shortness of breath.        I have reviewed the patient's new clinical results.    Electronically signed by ARACELI Mathews.

## 2022-09-13 NOTE — PLAN OF CARE
Problem: Adult Inpatient Plan of Care  Goal: Absence of Hospital-Acquired Illness or Injury  Intervention: Identify and Manage Fall Risk  Recent Flowsheet Documentation  Taken 9/13/2022 0400 by Agustín Campbell RN  Safety Promotion/Fall Prevention:   clutter free environment maintained   assistive device/personal items within reach   safety round/check completed   room organization consistent  Taken 9/13/2022 0200 by Agustín Campbell RN  Safety Promotion/Fall Prevention:   assistive device/personal items within reach   clutter free environment maintained   safety round/check completed   room organization consistent  Taken 9/13/2022 0000 by Agustín Campbell RN  Safety Promotion/Fall Prevention:   assistive device/personal items within reach   clutter free environment maintained   safety round/check completed   room organization consistent  Taken 9/12/2022 2200 by Agustín Campbell RN  Safety Promotion/Fall Prevention:   safety round/check completed   assistive device/personal items within reach   clutter free environment maintained  Taken 9/12/2022 2000 by Agustín Campbell RN  Safety Promotion/Fall Prevention:   assistive device/personal items within reach   clutter free environment maintained   room organization consistent  Intervention: Prevent Skin Injury  Recent Flowsheet Documentation  Taken 9/12/2022 2000 by Agustín Campbell RN  Body Position: position changed independently  Intervention: Prevent and Manage VTE (Venous Thromboembolism) Risk  Recent Flowsheet Documentation  Taken 9/12/2022 2000 by Agustín Campbell RN  Activity Management:   activity encouraged   ambulated to bathroom   ambulated in room   up ad beatriz  Intervention: Prevent Infection  Recent Flowsheet Documentation  Taken 9/12/2022 2200 by Agustín Campbell RN  Infection Prevention:   hand hygiene promoted   rest/sleep promoted   personal protective equipment utilized   single patient room provided   equipment surfaces disinfected  Taken 9/12/2022 2000 by  Agustín Campbell, RN  Infection Prevention:   equipment surfaces disinfected   hand hygiene promoted   personal protective equipment utilized   rest/sleep promoted   single patient room provided  Goal: Optimal Comfort and Wellbeing  Intervention: Provide Person-Centered Care  Recent Flowsheet Documentation  Taken 9/12/2022 2000 by Agustín Campbell, RN  Trust Relationship/Rapport:   care explained   questions encouraged   questions answered   Goal Outcome Evaluation:     Patient reports feeling better.  Started on IV antibiotics and steroids.  Vitals WNL.  Sats > 92% on 2L.  Wears home CPAP at night.  Alert and oriented.  Patient is able to ambulate to bathroom on own.

## 2022-09-14 LAB
ANION GAP SERPL CALCULATED.3IONS-SCNC: 11 MMOL/L (ref 5–15)
BASOPHILS # BLD AUTO: 0 10*3/MM3 (ref 0–0.2)
BASOPHILS NFR BLD AUTO: 0.3 % (ref 0–1.5)
BUN SERPL-MCNC: 19 MG/DL (ref 8–23)
BUN/CREAT SERPL: 29.2 (ref 7–25)
CALCIUM SPEC-SCNC: 9.1 MG/DL (ref 8.6–10.5)
CHLORIDE SERPL-SCNC: 102 MMOL/L (ref 98–107)
CO2 SERPL-SCNC: 27 MMOL/L (ref 22–29)
CREAT SERPL-MCNC: 0.65 MG/DL (ref 0.57–1)
DEPRECATED RDW RBC AUTO: 48.1 FL (ref 37–54)
EGFRCR SERPLBLD CKD-EPI 2021: 99.7 ML/MIN/1.73
EOSINOPHIL # BLD AUTO: 0 10*3/MM3 (ref 0–0.4)
EOSINOPHIL NFR BLD AUTO: 0 % (ref 0.3–6.2)
ERYTHROCYTE [DISTWIDTH] IN BLOOD BY AUTOMATED COUNT: 15.6 % (ref 12.3–15.4)
GLUCOSE SERPL-MCNC: 165 MG/DL (ref 65–99)
HCT VFR BLD AUTO: 36.4 % (ref 34–46.6)
HGB BLD-MCNC: 11.6 G/DL (ref 12–15.9)
LYMPHOCYTES # BLD AUTO: 0.7 10*3/MM3 (ref 0.7–3.1)
LYMPHOCYTES NFR BLD AUTO: 6.4 % (ref 19.6–45.3)
MCH RBC QN AUTO: 28.2 PG (ref 26.6–33)
MCHC RBC AUTO-ENTMCNC: 31.8 G/DL (ref 31.5–35.7)
MCV RBC AUTO: 88.8 FL (ref 79–97)
MONOCYTES # BLD AUTO: 0.8 10*3/MM3 (ref 0.1–0.9)
MONOCYTES NFR BLD AUTO: 8.1 % (ref 5–12)
NEUTROPHILS NFR BLD AUTO: 8.9 10*3/MM3 (ref 1.7–7)
NEUTROPHILS NFR BLD AUTO: 85.2 % (ref 42.7–76)
NRBC BLD AUTO-RTO: 0 /100 WBC (ref 0–0.2)
PLATELET # BLD AUTO: 287 10*3/MM3 (ref 140–450)
PMV BLD AUTO: 9 FL (ref 6–12)
POTASSIUM SERPL-SCNC: 4.9 MMOL/L (ref 3.5–5.2)
RBC # BLD AUTO: 4.1 10*6/MM3 (ref 3.77–5.28)
SODIUM SERPL-SCNC: 140 MMOL/L (ref 136–145)
WBC NRBC COR # BLD: 10.5 10*3/MM3 (ref 3.4–10.8)

## 2022-09-14 PROCEDURE — 94660 CPAP INITIATION&MGMT: CPT

## 2022-09-14 PROCEDURE — 85025 COMPLETE CBC W/AUTO DIFF WBC: CPT | Performed by: NURSE PRACTITIONER

## 2022-09-14 PROCEDURE — 63710000001 PREDNISONE PER 1 MG

## 2022-09-14 PROCEDURE — 94799 UNLISTED PULMONARY SVC/PX: CPT

## 2022-09-14 PROCEDURE — 96372 THER/PROPH/DIAG INJ SC/IM: CPT

## 2022-09-14 PROCEDURE — 63710000001 PREDNISONE PER 5 MG

## 2022-09-14 PROCEDURE — G0378 HOSPITAL OBSERVATION PER HR: HCPCS

## 2022-09-14 PROCEDURE — 97116 GAIT TRAINING THERAPY: CPT

## 2022-09-14 PROCEDURE — 25010000002 ENOXAPARIN PER 10 MG: Performed by: NURSE PRACTITIONER

## 2022-09-14 PROCEDURE — 25010000002 METHYLPREDNISOLONE PER 40 MG: Performed by: NURSE PRACTITIONER

## 2022-09-14 PROCEDURE — 80048 BASIC METABOLIC PNL TOTAL CA: CPT | Performed by: NURSE PRACTITIONER

## 2022-09-14 PROCEDURE — 96376 TX/PRO/DX INJ SAME DRUG ADON: CPT

## 2022-09-14 PROCEDURE — 25010000002 AMPICILLIN-SULBACTAM PER 1.5 G: Performed by: INTERNAL MEDICINE

## 2022-09-14 PROCEDURE — 94761 N-INVAS EAR/PLS OXIMETRY MLT: CPT

## 2022-09-14 PROCEDURE — 97110 THERAPEUTIC EXERCISES: CPT

## 2022-09-14 PROCEDURE — 94664 DEMO&/EVAL PT USE INHALER: CPT

## 2022-09-14 RX ORDER — AMOXICILLIN AND CLAVULANATE POTASSIUM 500; 125 MG/1; MG/1
1 TABLET, FILM COATED ORAL EVERY 12 HOURS SCHEDULED
Status: DISCONTINUED | OUTPATIENT
Start: 2022-09-14 | End: 2022-09-15 | Stop reason: HOSPADM

## 2022-09-14 RX ORDER — PREDNISONE 10 MG/1
10 TABLET ORAL DAILY
Status: DISCONTINUED | OUTPATIENT
Start: 2022-09-18 | End: 2022-09-15 | Stop reason: HOSPADM

## 2022-09-14 RX ORDER — PREDNISONE 20 MG/1
20 TABLET ORAL DAILY
Status: DISCONTINUED | OUTPATIENT
Start: 2022-09-16 | End: 2022-09-15 | Stop reason: HOSPADM

## 2022-09-14 RX ADMIN — Medication 3 ML: at 08:19

## 2022-09-14 RX ADMIN — AMPICILLIN SODIUM AND SULBACTAM SODIUM 3 G: 2; 1 INJECTION, POWDER, FOR SOLUTION INTRAMUSCULAR; INTRAVENOUS at 04:29

## 2022-09-14 RX ADMIN — PREDNISONE 30 MG: 20 TABLET ORAL at 08:14

## 2022-09-14 RX ADMIN — HYDROCODONE BITARTRATE AND HOMATROPINE METHYLBROMIDE 5 ML: 1.5; 5 SYRUP ORAL at 04:29

## 2022-09-14 RX ADMIN — Medication 3 ML: at 21:26

## 2022-09-14 RX ADMIN — PANTOPRAZOLE SODIUM 40 MG: 40 TABLET, DELAYED RELEASE ORAL at 08:14

## 2022-09-14 RX ADMIN — HYDROCODONE BITARTRATE AND HOMATROPINE METHYLBROMIDE 5 ML: 1.5; 5 SYRUP ORAL at 18:10

## 2022-09-14 RX ADMIN — CITALOPRAM HYDROBROMIDE 40 MG: 20 TABLET ORAL at 08:15

## 2022-09-14 RX ADMIN — IPRATROPIUM BROMIDE AND ALBUTEROL SULFATE 3 ML: .5; 3 SOLUTION RESPIRATORY (INHALATION) at 18:42

## 2022-09-14 RX ADMIN — IPRATROPIUM BROMIDE AND ALBUTEROL SULFATE 3 ML: .5; 3 SOLUTION RESPIRATORY (INHALATION) at 08:28

## 2022-09-14 RX ADMIN — AMOXICILLIN AND CLAVULANATE POTASSIUM 500 MG: 500; 125 TABLET, FILM COATED ORAL at 21:26

## 2022-09-14 RX ADMIN — ENOXAPARIN SODIUM 40 MG: 100 INJECTION SUBCUTANEOUS at 21:26

## 2022-09-14 RX ADMIN — IPRATROPIUM BROMIDE AND ALBUTEROL SULFATE 3 ML: .5; 3 SOLUTION RESPIRATORY (INHALATION) at 12:40

## 2022-09-14 RX ADMIN — ENOXAPARIN SODIUM 40 MG: 100 INJECTION SUBCUTANEOUS at 08:15

## 2022-09-14 RX ADMIN — TRAMADOL HYDROCHLORIDE 50 MG: 50 TABLET, COATED ORAL at 10:08

## 2022-09-14 RX ADMIN — CETIRIZINE HYDROCHLORIDE 5 MG: 10 TABLET, FILM COATED ORAL at 08:15

## 2022-09-14 RX ADMIN — LOSARTAN POTASSIUM 100 MG: 50 TABLET, FILM COATED ORAL at 08:15

## 2022-09-14 RX ADMIN — METHYLPREDNISOLONE SODIUM SUCCINATE 40 MG: 40 INJECTION, POWDER, FOR SOLUTION INTRAMUSCULAR; INTRAVENOUS at 02:40

## 2022-09-14 RX ADMIN — AMOXICILLIN AND CLAVULANATE POTASSIUM 500 MG: 500; 125 TABLET, FILM COATED ORAL at 08:14

## 2022-09-14 RX ADMIN — FAMOTIDINE 40 MG: 20 TABLET, FILM COATED ORAL at 08:15

## 2022-09-14 RX ADMIN — MONTELUKAST 10 MG: 10 TABLET, FILM COATED ORAL at 21:26

## 2022-09-14 RX ADMIN — HYDROCODONE BITARTRATE AND HOMATROPINE METHYLBROMIDE 5 ML: 1.5; 5 SYRUP ORAL at 13:37

## 2022-09-14 NOTE — PLAN OF CARE
Goal Outcome Evaluation:       Pt demonstrated improved mobility/activity tolerance this date, demonstrating MOD I with bed mobility/transfers, progressed to gait training bout 180 ft with no AD requiring supervision, completed 30 Second Sit to/from Stand Test for 7 reps, and standing BLE exercises. Anticipate routine d/c home with no PT needs at d/c but to continue home walking program and home exercise program. PPE: gloves, mask, safety glasses     Archana West, PT, DPT

## 2022-09-14 NOTE — PLAN OF CARE
Problem: Adult Inpatient Plan of Care  Goal: Absence of Hospital-Acquired Illness or Injury  Intervention: Identify and Manage Fall Risk  Recent Flowsheet Documentation  Taken 9/14/2022 0400 by Agustín Campbell RN  Safety Promotion/Fall Prevention:   assistive device/personal items within reach   clutter free environment maintained   safety round/check completed   room organization consistent  Taken 9/14/2022 0200 by Agustín Campbell RN  Safety Promotion/Fall Prevention:   assistive device/personal items within reach   clutter free environment maintained   safety round/check completed   room organization consistent  Taken 9/14/2022 0000 by Agustín Campbell RN  Safety Promotion/Fall Prevention:   assistive device/personal items within reach   clutter free environment maintained   safety round/check completed   room organization consistent  Taken 9/13/2022 2000 by Agustín Campbell RN  Safety Promotion/Fall Prevention:   assistive device/personal items within reach   clutter free environment maintained   room organization consistent  Intervention: Prevent Skin Injury  Recent Flowsheet Documentation  Taken 9/13/2022 2000 by Agustín Campbell RN  Body Position: position changed independently  Intervention: Prevent and Manage VTE (Venous Thromboembolism) Risk  Recent Flowsheet Documentation  Taken 9/13/2022 2000 by Agustín Campbell RN  Activity Management:   activity encouraged   ambulated to bathroom   ambulated in room   up ad beatriz  Intervention: Prevent Infection  Recent Flowsheet Documentation  Taken 9/13/2022 2000 by Agustín Campbell RN  Infection Prevention:   equipment surfaces disinfected   hand hygiene promoted   personal protective equipment utilized   rest/sleep promoted   single patient room provided  Goal: Optimal Comfort and Wellbeing  Intervention: Provide Person-Centered Care  Recent Flowsheet Documentation  Taken 9/13/2022 2000 by Agustín Campbell RN  Trust Relationship/Rapport:   thoughts/feelings acknowledged    questions encouraged   questions answered   care explained   choices provided   Goal Outcome Evaluation:      Patient rested well during the night.  Productive cough.  Some shortness of air with exertion.  IV steroids and antibiotics.  Vitals WNL.

## 2022-09-14 NOTE — THERAPY TREATMENT NOTE
Subjective: Pt agreeable to therapeutic plan of care. Denies pain. Reports she was just getting ready to take a sponge bath.    Objective:     Bed mobility - Modified-Independent  Transfers - Modified-Independent  Ambulation - 180 ft Supervision     Therapeutic exercise: Pt completed 1x10 standing MIP, mini squats, hip abduction, hip adduction, heel raises.    30 Second Sit to/from Stand Test: 7 reps    Vitals: WNL    Pain: 0 VAS  Education: Provided education on importance of mobility and skilled verbal / tactile cueing throughout intervention.     Assessment: Pt demonstrated improved mobility/activity tolerance this date, demonstrating MOD I with bed mobility/transfers, progressed to gait training bout 180 ft with no AD requiring supervision, completed 30 Second Sit to/from Stand Test for 7 reps, and standing BLE exercises. Anticipate routine d/c home with no PT needs at d/c but to continue home walking program and home exercise program. PPE: gloves, mask, safety glasses    Plan/Recommendations:   No ongoing therapy recommended post-acute care. No therapy needs.. Pt requires no DME at discharge.     Pt desires Home at discharge. Pt cooperative; agreeable to therapeutic recommendations and plan of care.         Post-Tx Position: In bathroom  PPE: mask, gloves and eye protection

## 2022-09-14 NOTE — PROGRESS NOTES
LOS: 1 day   Patient Care Team:  Valerio George MD as PCP - General (Family Medicine)    Subjective         Review of Systems   Constitutional: Positive for activity change and fatigue.   HENT: Negative.    Respiratory: Negative for shortness of breath.    Cardiovascular: Negative.    Gastrointestinal: Negative.    Genitourinary: Negative.    Musculoskeletal: Negative.    Neurological: Positive for weakness.   Psychiatric/Behavioral: Negative.            Objective     Vital Signs  Temp:  [97.5 °F (36.4 °C)-98.2 °F (36.8 °C)] 98.2 °F (36.8 °C)  Heart Rate:  [] 87  Resp:  [16-20] 18  BP: (108-163)/(63-93) 148/85      Physical Exam  Constitutional:       Appearance: She is obese. She is not ill-appearing.   HENT:      Head: Normocephalic and atraumatic.      Right Ear: External ear normal.      Left Ear: External ear normal.      Nose: Nose normal.      Mouth/Throat:      Mouth: Mucous membranes are moist.   Eyes:      General:         Right eye: No discharge.         Left eye: No discharge.   Cardiovascular:      Rate and Rhythm: Normal rate and regular rhythm.      Pulses: Normal pulses.      Heart sounds: Normal heart sounds.   Pulmonary:      Effort: Pulmonary effort is normal.      Breath sounds: Normal breath sounds.   Abdominal:      General: Bowel sounds are normal.      Palpations: Abdomen is soft.   Musculoskeletal:         General: Normal range of motion.      Cervical back: Normal range of motion.   Skin:     General: Skin is warm.   Neurological:      Mental Status: She is alert and oriented to person, place, and time.   Psychiatric:         Behavior: Behavior normal.              Results Review:    Lab Results (last 24 hours)     Procedure Component Value Units Date/Time    Basic Metabolic Panel [[id number]]  (Abnormal) Collected: 09/14/22 0237    Specimen: Blood Updated: 09/14/22 0430     Glucose 165 mg/dL      BUN 19 mg/dL      Creatinine 0.65 mg/dL      Sodium 140 mmol/L      Potassium 4.9  mmol/L      Comment: Slight hemolysis detected by analyzer. Results may be affected.        Chloride 102 mmol/L      CO2 27.0 mmol/L      Calcium 9.1 mg/dL      BUN/Creatinine Ratio 29.2     Anion Gap 11.0 mmol/L      eGFR 99.7 mL/min/1.73      Comment: National Kidney Foundation and American Society of Nephrology (ASN) Task Force recommended calculation based on the Chronic Kidney Disease Epidemiology Collaboration (CKD-EPI) equation refit without adjustment for race.       Narrative:      GFR Normal >60  Chronic Kidney Disease <60  Kidney Failure <15      CBC & Differential [[id number]]  (Abnormal) Collected: 09/14/22 0237    Specimen: Blood Updated: 09/14/22 0416    Narrative:      The following orders were created for panel order CBC & Differential.  Procedure                               Abnormality         Status                     ---------                               -----------         ------                     CBC Auto Differential[[id number]]        Abnormal            Final result                 Please view results for these tests on the individual orders.    CBC Auto Differential [[id number]]  (Abnormal) Collected: 09/14/22 0237    Specimen: Blood Updated: 09/14/22 0416     WBC 10.50 10*3/mm3      RBC 4.10 10*6/mm3      Hemoglobin 11.6 g/dL      Hematocrit 36.4 %      MCV 88.8 fL      MCH 28.2 pg      MCHC 31.8 g/dL      RDW 15.6 %      RDW-SD 48.1 fl      MPV 9.0 fL      Platelets 287 10*3/mm3      Neutrophil % 85.2 %      Lymphocyte % 6.4 %      Monocyte % 8.1 %      Eosinophil % 0.0 %      Basophil % 0.3 %      Neutrophils, Absolute 8.90 10*3/mm3      Lymphocytes, Absolute 0.70 10*3/mm3      Monocytes, Absolute 0.80 10*3/mm3      Eosinophils, Absolute 0.00 10*3/mm3      Basophils, Absolute 0.00 10*3/mm3      nRBC 0.0 /100 WBC            Imaging Results (Last 24 Hours)     ** No results found for the last 24 hours. **               I reviewed the patient's new clinical results.    Medication  Review:   Scheduled Meds:amoxicillin-clavulanate, 1 tablet, Oral, Q12H  cetirizine, 5 mg, Oral, Daily  citalopram, 40 mg, Oral, Daily  enoxaparin, 40 mg, Subcutaneous, BID  famotidine, 40 mg, Oral, Daily  ipratropium-albuterol, 3 mL, Nebulization, Q6H While Awake - RT  losartan, 100 mg, Oral, Daily  montelukast, 10 mg, Oral, Nightly  pantoprazole, 40 mg, Oral, Daily  predniSONE, 30 mg, Oral, Daily   Followed by  [START ON 9/16/2022] predniSONE, 20 mg, Oral, Daily   Followed by  [START ON 9/18/2022] predniSONE, 10 mg, Oral, Daily  sodium chloride, 3 mL, Intravenous, Q12H      Continuous Infusions:   PRN Meds:.benzonatate  •  hydrALAZINE  •  HYDROcodone Bit-Homatrop MBr  •  ibuprofen  •  melatonin  •  ondansetron **OR** ondansetron  •  [COMPLETED] Insert peripheral IV **AND** sodium chloride  •  sodium chloride  •  traMADol     Interval History:    Assessment & Plan     Dyspnea  Interstitial lung disease  -Pulmonary following  -singular/nebs/steroids/unasyn  -pulm to start IgE      RA  -hold medication for now  HTN-home med   Mood disorder  -continue home medication     Diet: HH  DVT prophylaxis: lovenox  GI prophylaxis: pepcid  Code status: full    Plan for disposition:Home hopefully tomorrow    Carolyn Maddox, APRN  09/14/22  10:37 EDT

## 2022-09-14 NOTE — PLAN OF CARE
Goal Outcome Evaluation:      Patient is A&O. Up ad beatriz and tolerating well. No recent c/o pain. Anticipating D/C to home tomorrow.         Problem: Adult Inpatient Plan of Care  Goal: Plan of Care Review  Outcome: Ongoing, Progressing  Goal: Patient-Specific Goal (Individualized)  Outcome: Ongoing, Progressing  Goal: Absence of Hospital-Acquired Illness or Injury  Outcome: Ongoing, Progressing  Intervention: Identify and Manage Fall Risk  Recent Flowsheet Documentation  Taken 9/14/2022 1400 by Jose A Carrasco LPN  Safety Promotion/Fall Prevention:   safety round/check completed   room organization consistent   assistive device/personal items within reach   clutter free environment maintained   nonskid shoes/slippers when out of bed  Taken 9/14/2022 1240 by Jose A Carrasco LPN  Safety Promotion/Fall Prevention:   safety round/check completed   room organization consistent   nonskid shoes/slippers when out of bed   assistive device/personal items within reach  Taken 9/14/2022 1002 by Jose A Carrasco LPN  Safety Promotion/Fall Prevention:   safety round/check completed   nonskid shoes/slippers when out of bed   clutter free environment maintained   assistive device/personal items within reach   room organization consistent  Taken 9/14/2022 0834 by Jose A Carrasco LPN  Safety Promotion/Fall Prevention:   safety round/check completed   nonskid shoes/slippers when out of bed   clutter free environment maintained   room organization consistent  Taken 9/14/2022 0802 by Jose A Carrasco LPN  Safety Promotion/Fall Prevention:   safety round/check completed   room organization consistent   assistive device/personal items within reach   clutter free environment maintained   nonskid shoes/slippers when out of bed  Intervention: Prevent Skin Injury  Recent Flowsheet Documentation  Taken 9/14/2022 0834 by Jose A Carrasco LPN  Body Position: position changed independently  Intervention: Prevent and Manage VTE (Venous  Thromboembolism) Risk  Recent Flowsheet Documentation  Taken 9/14/2022 0834 by Jose A Carrasco LPN  Activity Management: activity adjusted per tolerance  Intervention: Prevent Infection  Recent Flowsheet Documentation  Taken 9/14/2022 1600 by Jose A Carrasco LPN  Infection Prevention:   single patient room provided   rest/sleep promoted   personal protective equipment utilized   hand hygiene promoted  Taken 9/14/2022 1400 by Jose A Carrasco LPN  Infection Prevention:   single patient room provided   rest/sleep promoted   personal protective equipment utilized   hand hygiene promoted  Taken 9/14/2022 1240 by Jose A Carrasco LPN  Infection Prevention:   single patient room provided   rest/sleep promoted   personal protective equipment utilized   hand hygiene promoted  Taken 9/14/2022 1002 by Jose A Carrasco LPN  Infection Prevention:   single patient room provided   rest/sleep promoted   personal protective equipment utilized   hand hygiene promoted  Taken 9/14/2022 0834 by Jose A Carrasco LPN  Infection Prevention:   single patient room provided   rest/sleep promoted   personal protective equipment utilized   hand hygiene promoted  Taken 9/14/2022 0802 by Jose A Carrasco LPN  Infection Prevention:   single patient room provided   personal protective equipment utilized   rest/sleep promoted   hand hygiene promoted  Goal: Optimal Comfort and Wellbeing  Outcome: Ongoing, Progressing  Intervention: Provide Person-Centered Care  Recent Flowsheet Documentation  Taken 9/14/2022 0834 by Jose A Carrasco LPN  Trust Relationship/Rapport:   care explained   thoughts/feelings acknowledged  Goal: Readiness for Transition of Care  Outcome: Ongoing, Progressing     Problem: Breathing Pattern Ineffective  Goal: Effective Breathing Pattern  Outcome: Ongoing, Progressing  Intervention: Promote Improved Breathing Pattern  Recent Flowsheet Documentation  Taken 9/14/2022 0834 by Jose A Carrasco LPN  Head of Bed (HOB) Positioning: HOB  elevated

## 2022-09-14 NOTE — PROGRESS NOTES
Daily Progress Note        Shortness of breath      Assessment    Acute interstitial lung disease exacerbation   interstitial lung disease seen on CAT scan in May 2021 has slightly progressed on repeat CAT scan 9/10/2022  Hosp f/u COVID 1-2022, completed remdesivir, steroid.  Bronch with no growth.   chronic cough and worsening SOB and chronic cough s/p covid-19 July 4th. Can taste a bad taste after coughing. Sputum white to greyish color. Currently on Rituxan per rheumatology.   Denies a fever but states she has difficulty walking short distances now and is using home oxygen more.  ECHO- EF 60%, rvsp 25.8        BETTY, AHI 21.9   history of recurrent bronchitis    CT scan October 2018 stable appearance of mild interstitial lung disease possible RA  bronchoscopy 08/03/2018 bacteria negative AFP negative fungal few penicillium species  bronchoscopy 03/02/2018 showed Pseudomonas sensitive to all antibiotics,    10 days of Cipro p.o.  tobramycin nebulizer 300 mg b.i.d. for 10 days   reactive airway disease and inflammation noted during bronchoscopy   AFB is negative after 43 days,  fungal is negative,  PCPs negative  Interstitial lung disease CT scan of the chest 02/18/2018 showed ground-glass alveolar lung disease with micronodular rule out infection  Patient had cough-vomit syndrome,  received 10 days of azithromycin  Bronchoscopy 08/16/2017, grew streptococcal pneumonia  AFB and fungal is negative Cytomegalovirus and PCP PCR are negative  Viral panel is negative  Cough and SOA, initially improved with symbicort.  Still has residual cough  History of RA on Humira, Plaquenil, off methotrexate  on prednisone 20 mg daily,  Chest x-ray May 2017 showed increased marking in the right upper lobe   ct chest 8/2016, mildly prominent interstitial markings in the periphery of the upper and lower lobes, and right middle lobe which are chronic. centrilobular and ground-glass opacities  predominantly in the lower lobes  Pulmonary  Function Test  June 2019 FEV1 54% total lung capacity 61% DLCO 46% which has not changed much from 2017 but was a change from August 2016 showed FEV1 85% to 90% predicted total lung capacity 67% predicted and the RV 15% predicted diffusion capacity 78% predicted  Most recent pulmonary function test June 2017 showed FEV1 54-55% predicted which is significant decline over 12 months from 90%  Total lung capacity 61% predicted, diffusion capacity 46% predicted which is significant decline from 78% predicted last year  Features of obstructive sleep apnea  Hypertension most likely related to underlying obstructive sleep apnea and diagnosed untreated        Recommendations:     IgE with zone 8 has been ordered    Wean steroids prednisone 6-day taper  Antibiotics de-escalated Unasyn  and start Augmentin p.o.    DuoNeb  Singulair  DVT prophylaxis Lovenox  GI prophylaxis Pepcid and Protonix     Discussed with patient initiating Ofev as an outpatient for lung fibrosis     CT chest at priority radiology 9/10/2022       LOS: 1 day     Subjective         Objective     Vital signs for last 24 hours:  Vitals:    09/13/22 1834 09/13/22 2002 09/13/22 2332 09/14/22 0438   BP:  150/84 108/63 163/93   BP Location:  Right arm Right arm Right arm   Patient Position:  Lying Lying Lying   Pulse: 104 117 90 91   Resp: 18 20 16 18   Temp:  97.5 °F (36.4 °C) 98 °F (36.7 °C) 97.9 °F (36.6 °C)   TempSrc:  Oral Oral Oral   SpO2: 94% 95% 98% 94%   Weight:    107 kg (236 lb 8.9 oz)   Height:           Intake/Output last 3 shifts:  I/O last 3 completed shifts:  In: 600 [P.O.:600]  Out: 1300 [Urine:1300]  Intake/Output this shift:  I/O this shift:  In: 420 [P.O.:420]  Out: 1300 [Urine:1300]      Radiology  Imaging Results (Last 24 Hours)     ** No results found for the last 24 hours. **          Labs:  Results from last 7 days   Lab Units 09/14/22  0237   WBC 10*3/mm3 10.50   HEMOGLOBIN g/dL 11.6*   HEMATOCRIT % 36.4   PLATELETS 10*3/mm3 287      Results from last 7 days   Lab Units 09/14/22  0237 09/13/22  0017 09/11/22 1955   SODIUM mmol/L 140   < > 138   POTASSIUM mmol/L 4.9   < > 4.3   CHLORIDE mmol/L 102   < > 101   CO2 mmol/L 27.0   < > 26.0   BUN mg/dL 19   < > 10   CREATININE mg/dL 0.65   < > 0.71   CALCIUM mg/dL 9.1   < > 9.4   BILIRUBIN mg/dL  --   --  0.3   ALK PHOS U/L  --   --  104   ALT (SGPT) U/L  --   --  14   AST (SGOT) U/L  --   --  16   GLUCOSE mg/dL 165*   < > 136*    < > = values in this interval not displayed.     Results from last 7 days   Lab Units 09/11/22 2325   PH, ARTERIAL pH units 7.439   PO2 ART mm Hg 94.4   PCO2, ARTERIAL mm Hg 37.3   HCO3 ART mmol/L 25.3     Results from last 7 days   Lab Units 09/11/22 1955   ALBUMIN g/dL 3.90     Results from last 7 days   Lab Units 09/11/22 1955   TROPONIN T ng/mL <0.010                           Meds:   SCHEDULE  ampicillin-sulbactam, 3 g, Intravenous, Q8H  cetirizine, 5 mg, Oral, Daily  citalopram, 40 mg, Oral, Daily  enoxaparin, 40 mg, Subcutaneous, BID  famotidine, 40 mg, Oral, Daily  ipratropium-albuterol, 3 mL, Nebulization, Q6H While Awake - RT  losartan, 100 mg, Oral, Daily  methylPREDNISolone sodium succinate, 40 mg, Intravenous, Q8H  montelukast, 10 mg, Oral, Nightly  pantoprazole, 40 mg, Oral, Daily  sodium chloride, 3 mL, Intravenous, Q12H      Infusions     PRNs  benzonatate  •  hydrALAZINE  •  HYDROcodone Bit-Homatrop MBr  •  ibuprofen  •  melatonin  •  ondansetron **OR** ondansetron  •  [COMPLETED] Insert peripheral IV **AND** sodium chloride  •  sodium chloride  •  traMADol    Physical Exam:  Physical Exam  Vitals reviewed.   Pulmonary:      Breath sounds: Wheezing and rhonchi present.   Skin:     General: Skin is warm and dry.   Neurological:      Mental Status: She is alert and oriented to person, place, and time.         ROS  Review of Systems   Respiratory: Positive for cough and shortness of breath.        I have reviewed the patient's new clinical  results.    Electronically signed by RAACELI Mathews.

## 2022-09-15 ENCOUNTER — READMISSION MANAGEMENT (OUTPATIENT)
Dept: CALL CENTER | Facility: HOSPITAL | Age: 62
End: 2022-09-15

## 2022-09-15 VITALS
SYSTOLIC BLOOD PRESSURE: 141 MMHG | HEIGHT: 61 IN | RESPIRATION RATE: 15 BRPM | OXYGEN SATURATION: 95 % | WEIGHT: 236.55 LBS | BODY MASS INDEX: 44.66 KG/M2 | DIASTOLIC BLOOD PRESSURE: 68 MMHG | TEMPERATURE: 98.4 F | HEART RATE: 100 BPM

## 2022-09-15 PROBLEM — M06.9 RHEUMATOID ARTHRITIS INVOLVING MULTIPLE SITES (HCC): Status: ACTIVE | Noted: 2022-09-15

## 2022-09-15 PROBLEM — G47.33 OSA (OBSTRUCTIVE SLEEP APNEA): Status: ACTIVE | Noted: 2022-09-15

## 2022-09-15 LAB
ANION GAP SERPL CALCULATED.3IONS-SCNC: 10 MMOL/L (ref 5–15)
BASOPHILS # BLD AUTO: 0.1 10*3/MM3 (ref 0–0.2)
BASOPHILS NFR BLD AUTO: 1 % (ref 0–1.5)
BUN SERPL-MCNC: 28 MG/DL (ref 8–23)
BUN/CREAT SERPL: 31.8 (ref 7–25)
CALCIUM SPEC-SCNC: 8.9 MG/DL (ref 8.6–10.5)
CHLORIDE SERPL-SCNC: 102 MMOL/L (ref 98–107)
CO2 SERPL-SCNC: 27 MMOL/L (ref 22–29)
CREAT SERPL-MCNC: 0.88 MG/DL (ref 0.57–1)
DEPRECATED RDW RBC AUTO: 47.7 FL (ref 37–54)
EGFRCR SERPLBLD CKD-EPI 2021: 74.4 ML/MIN/1.73
EOSINOPHIL # BLD AUTO: 0 10*3/MM3 (ref 0–0.4)
EOSINOPHIL NFR BLD AUTO: 0.3 % (ref 0.3–6.2)
ERYTHROCYTE [DISTWIDTH] IN BLOOD BY AUTOMATED COUNT: 15.5 % (ref 12.3–15.4)
GLUCOSE SERPL-MCNC: 110 MG/DL (ref 65–99)
HCT VFR BLD AUTO: 34.5 % (ref 34–46.6)
HGB BLD-MCNC: 11.1 G/DL (ref 12–15.9)
LYMPHOCYTES # BLD AUTO: 1.7 10*3/MM3 (ref 0.7–3.1)
LYMPHOCYTES NFR BLD AUTO: 18.5 % (ref 19.6–45.3)
MCH RBC QN AUTO: 28.3 PG (ref 26.6–33)
MCHC RBC AUTO-ENTMCNC: 32.2 G/DL (ref 31.5–35.7)
MCV RBC AUTO: 87.8 FL (ref 79–97)
MONOCYTES # BLD AUTO: 1.2 10*3/MM3 (ref 0.1–0.9)
MONOCYTES NFR BLD AUTO: 12.6 % (ref 5–12)
NEUTROPHILS NFR BLD AUTO: 6.3 10*3/MM3 (ref 1.7–7)
NEUTROPHILS NFR BLD AUTO: 67.6 % (ref 42.7–76)
NRBC BLD AUTO-RTO: 0.1 /100 WBC (ref 0–0.2)
PLATELET # BLD AUTO: 286 10*3/MM3 (ref 140–450)
PMV BLD AUTO: 8.7 FL (ref 6–12)
POTASSIUM SERPL-SCNC: 4.3 MMOL/L (ref 3.5–5.2)
QT INTERVAL: 321 MS
RBC # BLD AUTO: 3.93 10*6/MM3 (ref 3.77–5.28)
SODIUM SERPL-SCNC: 139 MMOL/L (ref 136–145)
WBC NRBC COR # BLD: 9.3 10*3/MM3 (ref 3.4–10.8)

## 2022-09-15 PROCEDURE — 94761 N-INVAS EAR/PLS OXIMETRY MLT: CPT

## 2022-09-15 PROCEDURE — G0378 HOSPITAL OBSERVATION PER HR: HCPCS

## 2022-09-15 PROCEDURE — 25010000002 ENOXAPARIN PER 10 MG: Performed by: NURSE PRACTITIONER

## 2022-09-15 PROCEDURE — 80048 BASIC METABOLIC PNL TOTAL CA: CPT | Performed by: NURSE PRACTITIONER

## 2022-09-15 PROCEDURE — 63710000001 PREDNISONE PER 1 MG

## 2022-09-15 PROCEDURE — 94664 DEMO&/EVAL PT USE INHALER: CPT

## 2022-09-15 PROCEDURE — 85025 COMPLETE CBC W/AUTO DIFF WBC: CPT | Performed by: NURSE PRACTITIONER

## 2022-09-15 PROCEDURE — 94618 PULMONARY STRESS TESTING: CPT

## 2022-09-15 PROCEDURE — 96372 THER/PROPH/DIAG INJ SC/IM: CPT

## 2022-09-15 PROCEDURE — 63710000001 PREDNISONE PER 5 MG

## 2022-09-15 PROCEDURE — 94799 UNLISTED PULMONARY SVC/PX: CPT

## 2022-09-15 RX ORDER — HYDROCODONE BITARTRATE AND HOMATROPINE METHYLBROMIDE ORAL SOLUTION 5; 1.5 MG/5ML; MG/5ML
5 LIQUID ORAL EVERY 4 HOURS PRN
Qty: 50 ML | Refills: 0 | Status: SHIPPED | OUTPATIENT
Start: 2022-09-15 | End: 2022-09-18

## 2022-09-15 RX ORDER — IPRATROPIUM BROMIDE AND ALBUTEROL SULFATE 2.5; .5 MG/3ML; MG/3ML
3 SOLUTION RESPIRATORY (INHALATION)
Qty: 360 ML | Refills: 1 | Status: SHIPPED | OUTPATIENT
Start: 2022-09-15

## 2022-09-15 RX ORDER — PREDNISONE 20 MG/1
20 TABLET ORAL DAILY
Qty: 2 TABLET | Refills: 0 | Status: SHIPPED | OUTPATIENT
Start: 2022-09-16 | End: 2022-09-18

## 2022-09-15 RX ORDER — AMOXICILLIN AND CLAVULANATE POTASSIUM 500; 125 MG/1; MG/1
1 TABLET, FILM COATED ORAL EVERY 12 HOURS SCHEDULED
Qty: 7 TABLET | Refills: 0 | Status: SHIPPED | OUTPATIENT
Start: 2022-09-15 | End: 2022-09-19

## 2022-09-15 RX ORDER — PREDNISONE 10 MG/1
10 TABLET ORAL DAILY
Qty: 6 TABLET | Refills: 0 | Status: SHIPPED | OUTPATIENT
Start: 2022-09-18 | End: 2022-09-20

## 2022-09-15 RX ADMIN — Medication 3 ML: at 09:18

## 2022-09-15 RX ADMIN — LOSARTAN POTASSIUM 100 MG: 50 TABLET, FILM COATED ORAL at 09:17

## 2022-09-15 RX ADMIN — CETIRIZINE HYDROCHLORIDE 5 MG: 10 TABLET, FILM COATED ORAL at 09:16

## 2022-09-15 RX ADMIN — ENOXAPARIN SODIUM 40 MG: 100 INJECTION SUBCUTANEOUS at 09:18

## 2022-09-15 RX ADMIN — FAMOTIDINE 40 MG: 20 TABLET, FILM COATED ORAL at 09:17

## 2022-09-15 RX ADMIN — TRAMADOL HYDROCHLORIDE 50 MG: 50 TABLET, COATED ORAL at 09:36

## 2022-09-15 RX ADMIN — IPRATROPIUM BROMIDE AND ALBUTEROL SULFATE 3 ML: .5; 3 SOLUTION RESPIRATORY (INHALATION) at 08:54

## 2022-09-15 RX ADMIN — AMOXICILLIN AND CLAVULANATE POTASSIUM 500 MG: 500; 125 TABLET, FILM COATED ORAL at 09:16

## 2022-09-15 RX ADMIN — CITALOPRAM HYDROBROMIDE 40 MG: 20 TABLET ORAL at 09:17

## 2022-09-15 RX ADMIN — IPRATROPIUM BROMIDE AND ALBUTEROL SULFATE 3 ML: .5; 3 SOLUTION RESPIRATORY (INHALATION) at 12:15

## 2022-09-15 RX ADMIN — PANTOPRAZOLE SODIUM 40 MG: 40 TABLET, DELAYED RELEASE ORAL at 09:17

## 2022-09-15 RX ADMIN — HYDROCODONE BITARTRATE AND HOMATROPINE METHYLBROMIDE 5 ML: 1.5; 5 SYRUP ORAL at 06:05

## 2022-09-15 RX ADMIN — PREDNISONE 30 MG: 20 TABLET ORAL at 09:17

## 2022-09-15 NOTE — CASE MANAGEMENT/SOCIAL WORK
Continued Stay Note  AMY Da Silva     Patient Name: Mariel Parker  MRN: [id number]  Today's Date: 9/15/2022    Admit Date: 9/11/2022     Discharge Plan     Row Name 09/15/22 1458       Plan    Plan DC Plan: Home with spouse    Plan Comments Noted order for walking oximetry. RT note on walking oximetry noted the patient's baseline O2 on RA at 87%. Contacted the NP to see if discharge was anticipated for today and if O2 needed set up. She reported the patient has O2 but only wears PRN and will need it continuous now.           Expected Discharge Date and Time     Expected Discharge Date Expected Discharge Time    Sep 15, 2022       Phone communication or documentation only- no physical contact with patient or family.    Nadia Braxton RN     Office Phone: [phone]  Office Cell: [phone]

## 2022-09-15 NOTE — PROGRESS NOTES
Exercise Oximetry    Patient Name:Mariel Parker   MRN: [id number]   Date: 09/15/22             ROOM AIR BASELINE   SpO2% 87   Heart Rate 89   Blood Pressure      EXERCISE ON ROOM AIR SpO2% EXERCISE ON O2 @ 2 LPM SpO2%   1 MINUTE  1 MINUTE    2 MINUTES  2 MINUTES    3 MINUTES  3 MINUTES    4 MINUTES  4 MINUTES    5 MINUTES  5 MINUTES    6 MINUTES  6 MINUTES               Distance Walked   Distance Walked   Dyspnea (Lester Scale)   Dyspnea (Lester Scale)   Fatigue (Lester Scale)   Fatigue (Lester Scale)   SpO2% Post Exercise   SpO2% Post Exercise   HR Post Exercise   HR Post Exercise   Time to Recovery   Time to Recovery     Comments: Placed back  on 2 liters, 02 sat 95% on 2 liters.

## 2022-09-15 NOTE — DISCHARGE SUMMARY
Date of Discharge:  9/15/2022    Discharge Diagnosis:  ILD- finish taper prednisone - finish abx   RA- call rheumatology and ask about when to restart rituxin     Presenting Problem/History of Present Illness  Active Hospital Problems    Diagnosis  POA   • BETTY (obstructive sleep apnea) [G47.33]  Yes   • Rheumatoid arthritis involving multiple sites (HCC) [M06.9]  Yes   • ILD (interstitial lung disease) (HCC) [J84.9]  Yes   • Shortness of breath [R06.02]  Yes      Resolved Hospital Problems   No resolved problems to display.          Hospital Course  Patient is a 62 y.o. female presented with SOB . Pt with hx ILD. ER found  CXR with no acute process. Respiratory panel negative. CBC , CMP, Bnp, troponin, d dimer all negative. She was admitted for further management. Pulmonology was consulted. Steroids, Singulair, bronchodilators given . RA med was held with the concern of infectious process. She did improve with treatment.  Antibiotics have been deescalated to Augmentin and steroids have been tapered to po . Today she is hemodynamically stable and ready for DC. Dr Garcia has done some allergy labs. She will Fu with Dr Garcia in 2 weeks and get those lab resulted. She has a F set with our [facility] office. She will finish steroid taper and 7 more doses of augmentin    Procedures Performed         Consults:   Consults     Date and Time Order Name Status Description    9/12/2022  9:27 AM Inpatient Pulmonology Consult Completed     9/11/2022 10:14 PM Hospitalist (on-call MD unless specified)            Pertinent Test Results:    Lab Results (most recent)     Procedure Component Value Units Date/Time    Basic Metabolic Panel [[id number]]  (Abnormal) Collected: 09/15/22 0256    Specimen: Blood Updated: 09/15/22 0355     Glucose 110 mg/dL      BUN 28 mg/dL      Creatinine 0.88 mg/dL      Sodium 139 mmol/L      Potassium 4.3 mmol/L      Chloride 102 mmol/L      CO2 27.0 mmol/L      Calcium 8.9 mg/dL      BUN/Creatinine Ratio 31.8      Anion Gap 10.0 mmol/L      eGFR 74.4 mL/min/1.73      Comment: National Kidney Foundation and American Society of Nephrology (ASN) Task Force recommended calculation based on the Chronic Kidney Disease Epidemiology Collaboration (CKD-EPI) equation refit without adjustment for race.       Narrative:      GFR Normal >60  Chronic Kidney Disease <60  Kidney Failure <15      CBC & Differential [[id number]]  (Abnormal) Collected: 09/15/22 0256    Specimen: Blood Updated: 09/15/22 0336    Narrative:      The following orders were created for panel order CBC & Differential.  Procedure                               Abnormality         Status                     ---------                               -----------         ------                     CBC Auto Differential[[id number]]        Abnormal            Final result                 Please view results for these tests on the individual orders.    CBC Auto Differential [[id number]]  (Abnormal) Collected: 09/15/22 0256    Specimen: Blood Updated: 09/15/22 0336     WBC 9.30 10*3/mm3      RBC 3.93 10*6/mm3      Hemoglobin 11.1 g/dL      Hematocrit 34.5 %      MCV 87.8 fL      MCH 28.3 pg      MCHC 32.2 g/dL      RDW 15.5 %      RDW-SD 47.7 fl      MPV 8.7 fL      Platelets 286 10*3/mm3      Neutrophil % 67.6 %      Lymphocyte % 18.5 %      Monocyte % 12.6 %      Eosinophil % 0.3 %      Basophil % 1.0 %      Neutrophils, Absolute 6.30 10*3/mm3      Lymphocytes, Absolute 1.70 10*3/mm3      Monocytes, Absolute 1.20 10*3/mm3      Eosinophils, Absolute 0.00 10*3/mm3      Basophils, Absolute 0.10 10*3/mm3      nRBC 0.1 /100 WBC     Basic Metabolic Panel [[id number]]  (Abnormal) Collected: 09/14/22 0237    Specimen: Blood Updated: 09/14/22 0430     Glucose 165 mg/dL      BUN 19 mg/dL      Creatinine 0.65 mg/dL      Sodium 140 mmol/L      Potassium 4.9 mmol/L      Comment: Slight hemolysis detected by analyzer. Results may be affected.        Chloride 102 mmol/L      CO2 27.0 mmol/L       Calcium 9.1 mg/dL      BUN/Creatinine Ratio 29.2     Anion Gap 11.0 mmol/L      eGFR 99.7 mL/min/1.73      Comment: National Kidney Foundation and American Society of Nephrology (ASN) Task Force recommended calculation based on the Chronic Kidney Disease Epidemiology Collaboration (CKD-EPI) equation refit without adjustment for race.       Narrative:      GFR Normal >60  Chronic Kidney Disease <60  Kidney Failure <15      CBC & Differential [[id number]]  (Abnormal) Collected: 09/14/22 0237    Specimen: Blood Updated: 09/14/22 0416    Narrative:      The following orders were created for panel order CBC & Differential.  Procedure                               Abnormality         Status                     ---------                               -----------         ------                     CBC Auto Differential[[id number]]        Abnormal            Final result                 Please view results for these tests on the individual orders.    CBC Auto Differential [[id number]]  (Abnormal) Collected: 09/14/22 0237    Specimen: Blood Updated: 09/14/22 0416     WBC 10.50 10*3/mm3      RBC 4.10 10*6/mm3      Hemoglobin 11.6 g/dL      Hematocrit 36.4 %      MCV 88.8 fL      MCH 28.2 pg      MCHC 31.8 g/dL      RDW 15.6 %      RDW-SD 48.1 fl      MPV 9.0 fL      Platelets 287 10*3/mm3      Neutrophil % 85.2 %      Lymphocyte % 6.4 %      Monocyte % 8.1 %      Eosinophil % 0.0 %      Basophil % 0.3 %      Neutrophils, Absolute 8.90 10*3/mm3      Lymphocytes, Absolute 0.70 10*3/mm3      Monocytes, Absolute 0.80 10*3/mm3      Eosinophils, Absolute 0.00 10*3/mm3      Basophils, Absolute 0.00 10*3/mm3      nRBC 0.0 /100 WBC     IgE + Allergens (35) [[id number]] Collected: 09/13/22 0017    Specimen: Blood Updated: 09/13/22 0052    Blood Gas, Arterial - [[id number]] Collected: 09/11/22 2325    Specimen: Arterial Blood Updated: 09/11/22 2328     Site Right Radial     Lex's Test Positive     pH, Arterial 7.439 pH units      pCO2,  Arterial 37.3 mm Hg      pO2, Arterial 94.4 mm Hg      HCO3, Arterial 25.3 mmol/L      Base Excess, Arterial 1.2 mmol/L      Comment: Serial Number: [id number]:  [id number]        O2 Saturation, Arterial 97.6 %      CO2 Content 26.4 mmol/L      Barometric Pressure for Blood Gas --     Comment: N/A        Modality Cannula     FIO2 28 %      Hemodilution No    Respiratory Panel PCR w/COVID-19(SARS-CoV-2) [facility]/LYN/[facility] In-House, NP Swab in UTM/VTM, 3-4 HR TAT - Swab, Nasopharynx [[id number]]  (Normal) Collected: 09/11/22 1955    Specimen: Swab from Nasopharynx Updated: 09/11/22 2047     ADENOVIRUS, PCR Not Detected     Coronavirus 229E Not Detected     Coronavirus HKU1 Not Detected     Coronavirus NL63 Not Detected     Coronavirus OC43 Not Detected     COVID19 Not Detected     Human Metapneumovirus Not Detected     Human Rhinovirus/Enterovirus Not Detected     Influenza A PCR Not Detected     Influenza B PCR Not Detected     Parainfluenza Virus 1 Not Detected     Parainfluenza Virus 2 Not Detected     Parainfluenza Virus 3 Not Detected     Parainfluenza Virus 4 Not Detected     RSV, PCR Not Detected     Bordetella pertussis pcr Not Detected     Bordetella parapertussis PCR Not Detected     Chlamydophila pneumoniae PCR Not Detected     Mycoplasma pneumo by PCR Not Detected    Narrative:      In the setting of a positive respiratory panel with a viral infection PLUS a negative procalcitonin without other underlying concern for bacterial infection, consider observing off antibiotics or discontinuation of antibiotics and continue supportive care. If the respiratory panel is positive for atypical bacterial infection (Bordetella pertussis, Chlamydophila pneumoniae, or Mycoplasma pneumoniae), consider antibiotic de-escalation to target atypical bacterial infection.    Urinalysis, Microscopic Only - Urine, Clean Catch [[id number]]  (Abnormal) Collected: 09/11/22 2016    Specimen: Urine, Clean Catch Updated: 09/11/22  2031     RBC, UA 0-2 /HPF      WBC, UA 0-2 /HPF      Bacteria, UA None Seen /HPF      Squamous Epithelial Cells, UA 0-2 /HPF      Hyaline Casts, UA 0-2 /LPF      Methodology Automated Microscopy    Urinalysis With Microscopic If Indicated (No Culture) - Urine, Clean Catch [[id number]]  (Abnormal) Collected: 09/11/22 2016    Specimen: Urine, Clean Catch Updated: 09/11/22 2031     Color, UA Yellow     Appearance, UA Clear     pH, UA <=5.0     Specific Gravity, UA 1.017     Glucose, UA Negative     Ketones, UA Trace     Bilirubin, UA Negative     Blood, UA Negative     Protein, UA Negative     Leuk Esterase, UA Trace     Nitrite, UA Negative     Urobilinogen, UA 0.2 E.U./dL    Comprehensive Metabolic Panel [[id number]]  (Abnormal) Collected: 09/11/22 1955    Specimen: Blood Updated: 09/11/22 2025     Glucose 136 mg/dL      BUN 10 mg/dL      Creatinine 0.71 mg/dL      Sodium 138 mmol/L      Potassium 4.3 mmol/L      Chloride 101 mmol/L      CO2 26.0 mmol/L      Calcium 9.4 mg/dL      Total Protein 7.1 g/dL      Albumin 3.90 g/dL      ALT (SGPT) 14 U/L      AST (SGOT) 16 U/L      Alkaline Phosphatase 104 U/L      Total Bilirubin 0.3 mg/dL      Globulin 3.2 gm/dL      A/G Ratio 1.2 g/dL      BUN/Creatinine Ratio 14.1     Anion Gap 11.0 mmol/L      eGFR 96.3 mL/min/1.73      Comment: National Kidney Foundation and American Society of Nephrology (ASN) Task Force recommended calculation based on the Chronic Kidney Disease Epidemiology Collaboration (CKD-EPI) equation refit without adjustment for race.       Narrative:      GFR Normal >60  Chronic Kidney Disease <60  Kidney Failure <15      Troponin [[id number]]  (Normal) Collected: 09/11/22 1955    Specimen: Blood Updated: 09/11/22 2025     Troponin T <0.010 ng/mL     Narrative:      Troponin T Reference Range:  <= 0.03 ng/mL-   Negative for AMI  >0.03 ng/mL-     Abnormal for myocardial necrosis.  Clinicians would have to utilize clinical acumen, EKG, Troponin and serial  changes to determine if it is an Acute Myocardial Infarction or myocardial injury due to an underlying chronic condition.       Results may be falsely decreased if patient taking Biotin.      BNP [[id number]]  (Normal) Collected: 09/11/22 1955    Specimen: Blood Updated: 09/11/22 2023     proBNP 71.8 pg/mL     Narrative:      Among patients with dyspnea, NT-proBNP is highly sensitive for the detection of acute congestive heart failure. In addition NT-proBNP of <300 pg/ml effectively rules out acute congestive heart failure with 99% negative predictive value.    Results may be falsely decreased if patient taking Biotin.      D-dimer, Quantitative [[id number]]  (Normal) Collected: 09/11/22 1955    Specimen: Blood Updated: 09/11/22 2012     D-Dimer, Quantitative 0.32 mg/L (FEU)     Narrative:      Reference Range  --------------------------------------------------------------------     < 0.50   Negative Predictive Value  0.50-0.59   Indeterminate    >= 0.60   Probable VTE             A very low percentage of patients with DVT may yield D-Dimer results   below the cut-off of 0.50 mg/L FEU.  This is known to be more   prevalent in patients with distal DVT.             Results of this test should always be interpreted in conjunction with   the patient's medical history, clinical presentation and other   findings.  Clinical diagnosis should not be based on the result of   INNOVANCE D-Dimer alone.           Results for orders placed during the hospital encounter of 07/28/22    Adult Transthoracic Echo Complete W/ Cont if Necessary Per Protocol    Interpretation Summary  · Estimated left ventricular EF = 60% Left ventricular systolic function is normal.    Indication  Heart failure    Technically satisfactory study.  Mitral valve is structurally normal.  Tricuspid valve is structurally normal.  Aortic valve is structurally normal.  Pulmonic valve could not be well visualized.  No evidence for mitral tricuspid or aortic  regurgitation is seen by Doppler study.  Left atrium is enlarged.  Right atrium is normal in size.  Left ventricle is normal in size and contractility with ejection fraction of 60%.  Right ventricle is normal in size.  Atrial septum is intact.  Aorta is normal.  No pericardial effusion or intracardiac thrombus is seen.    Impression  Left atrial enlargement.  Normal pulmonary artery pressures (22 mmHg)  Structurally and functionally normal cardiac valves.  Left ventricular size and contractility is normal with ejection fraction of 60%.          Condition on Discharge:  Stable   Vital Signs  Temp:  [97.6 °F (36.4 °C)-98.2 °F (36.8 °C)] 98 °F (36.7 °C)  Heart Rate:  [] 84  Resp:  [16-18] 16  BP: (110-136)/(65-84) 110/65    Physical Exam:     General Appearance:    Alert, cooperative, in no acute distress   Head:    Normocephalic, without obvious abnormality, atraumatic   Eyes:            Lids and lashes normal, conjunctivae and sclerae normal, no   icterus, no pallor, corneas clear, PERRLA   Ears:    Ears appear intact with no abnormalities noted   Throat:   No oral lesions, no thrush, oral mucosa moist   Neck:   No adenopathy, supple, trachea midline, no thyromegaly, no   carotid bruit, no JVD   Lungs:      Wheezing ,respirations regular, even and                  unlabored    Heart:    Regular rhythm and normal rate, normal S1 and S2, no            murmur, no gallop, no rub, no click   Chest Wall:    No abnormalities observed   Abdomen:     Normal bowel sounds, no masses, no organomegaly, soft        non-tender, non-distended, no guarding, no rebound                tenderness   Extremities:   Moves all extremities well, no edema, no cyanosis, no             redness   Pulses:   Pulses palpable and equal bilaterally   Skin:   No bleeding, bruising or rash   Lymph nodes:   No palpable adenopathy   Neurologic:   Cranial nerves 2 - 12 grossly intact, sensation intact, DTR       present and equal bilaterally        Discharge Disposition  Home or Self Care    Discharge Medications     Discharge Medications      New Medications      Instructions Start Date   amoxicillin-clavulanate 500-125 MG per tablet  Commonly known as: AUGMENTIN   500 mg, Oral, Every 12 Hours Scheduled      HYDROcodone Bit-Homatrop MBr 5-1.5 MG/5ML solution  Commonly known as: HYCODAN   5 mL, Oral, Every 4 Hours PRN      ipratropium-albuterol 0.5-2.5 mg/3 ml nebulizer  Commonly known as: DUO-NEB   3 mL, Nebulization, Every 6 Hours While Awake - RT         Changes to Medications      Instructions Start Date   predniSONE 20 MG tablet  Commonly known as: DELTASONE  What changed: You were already taking a medication with the same name, and this prescription was added. Make sure you understand how and when to take each.   20 mg, Oral, Daily   Start Date: September 16, 2022     predniSONE 10 MG tablet  Commonly known as: DELTASONE  What changed:   · medication strength  · how much to take  · These instructions start on September 18, 2022. If you are unsure what to do until then, ask your doctor or other care provider.   10 mg, Oral, Daily   Start Date: September 18, 2022        Continue These Medications      Instructions Start Date   acetaminophen 650 MG 8 hr tablet  Commonly known as: TYLENOL   650 mg, Oral, Every 8 Hours PRN      albuterol (2.5 MG/3ML) 0.083% nebulizer solution  Commonly known as: PROVENTIL   2.5 mg, Nebulization, Every 6 Hours PRN      albuterol sulfate  (90 Base) MCG/ACT inhaler  Commonly known as: ProAir HFA   2 puffs, Inhalation, 4 Times Daily - RT      ascorbic acid 500 MG tablet  Commonly known as: VITAMIN C   500 mg, Oral, Daily      Calcium Citrate-Vitamin D 200-125 MG-UNIT tablet   1 tablet, Oral, Daily      citalopram 40 MG tablet  Commonly known as: CeleXA   40 mg, Oral, Every Morning      Eye Vitamins & Minerals tablet tablet  Generic drug: multivitamin with minerals   2 tablets, Oral, Daily      famotidine 40 MG  tablet  Commonly known as: PEPCID   40 mg, Oral, Daily      fexofenadine 180 MG tablet  Commonly known as: ALLEGRA   180 mg, Oral, Daily      fluticasone 50 MCG/ACT nasal spray  Commonly known as: FLONASE   2 sprays, Nasal, Daily      fluticasone-salmeterol 500-50 MCG/DOSE DISKUS  Commonly known as: ADVAIR   1 puff, Inhalation, 2 Times Daily - RT      guaiFENesin 600 MG 12 hr tablet  Commonly known as: MUCINEX   1,200 mg, Oral, 2 Times Daily      losartan 100 MG tablet  Commonly known as: Cozaar   100 mg, Oral, Every 24 Hours      montelukast 10 MG tablet  Commonly known as: SINGULAIR   10 mg, Oral, Nightly      pantoprazole 40 MG EC tablet  Commonly known as: PROTONIX   40 mg, Oral, Daily      Tessalon Perles 100 MG capsule  Generic drug: benzonatate   100 mg, Oral, 3 Times Daily PRN      traMADol 50 MG tablet  Commonly known as: ULTRAM   TAKE 2 TABLETS BY MOUTH TWICE DAILY AS NEEDED      vitamin b complex capsule capsule   1 capsule, Oral, Daily      zinc sulfate 220 (50 Zn) MG capsule  Commonly known as: ZINCATE   220 mg, Oral, Daily         Stop These Medications    Rituxan 100 MG/10ML solution injection  Generic drug: riTUXimab            Discharge Diet:     Activity at Discharge:     Follow-up Appointments  No future appointments.  Additional Instructions for the Follow-ups that You Need to Schedule     Discharge Follow-up with PCP   As directed       Currently Documented PCP:    Valerio George MD    PCP Phone Number:    [phone]     Follow Up Details: You have an appointment with Kamari George 9/21 at 840 am         Discharge Follow-up with Specified Provider: Dr Esvin warner will discuss the allergy lab results at FU; 2 Weeks   As directed      To: Dr Esvin warner will discuss the allergy lab results at FU    Follow Up: 2 Weeks               Test Results Pending at Discharge  Pending Labs     Order Current Status    IgE + Allergens (35) In process           ARACELI Perez  09/15/22  15:11 EDT    Time:  Discharge 25 min

## 2022-09-15 NOTE — PROGRESS NOTES
Daily Progress Note        Shortness of breath      Assessment    Acute interstitial lung disease exacerbation   interstitial lung disease seen on CAT scan in May 2021 has slightly progressed on repeat CAT scan 9/10/2022  Hosp f/u COVID 1-2022, completed remdesivir, steroid.  Bronch with no growth.   chronic cough and worsening SOB and chronic cough s/p covid-19 July 4th. Can taste a bad taste after coughing. Sputum white to greyish color. Currently on Rituxan per rheumatology.   Denies a fever but states she has difficulty walking short distances now and is using home oxygen more.  ECHO- EF 60%, rvsp 25.8        BETTY, AHI 21.9    history of recurrent bronchitis    CT scan October 2018 stable appearance of mild interstitial lung disease possible RA  bronchoscopy 08/03/2018 bacteria negative AFP negative fungal few penicillium species  bronchoscopy 03/02/2018 showed Pseudomonas sensitive to all antibiotics,    10 days of Cipro p.o.  tobramycin nebulizer 300 mg b.i.d. for 10 days   reactive airway disease and inflammation noted during bronchoscopy   AFB is negative after 43 days,  fungal is negative,  PCPs negative  Interstitial lung disease CT scan of the chest 02/18/2018 showed ground-glass alveolar lung disease with micronodular rule out infection  Patient had cough-vomit syndrome,  received 10 days of azithromycin  Bronchoscopy 08/16/2017, grew streptococcal pneumonia  AFB and fungal is negative Cytomegalovirus and PCP PCR are negative  Viral panel is negative  Cough and SOA, initially improved with symbicort.  Still has residual cough  History of RA on Humira, Plaquenil, off methotrexate  on prednisone 20 mg daily,  Chest x-ray May 2017 showed increased marking in the right upper lobe   ct chest 8/2016, mildly prominent interstitial markings in the periphery of the upper and lower lobes, and right middle lobe which are chronic. centrilobular and ground-glass opacities  predominantly in the lower lobes  Pulmonary  Function Test  June 2019 FEV1 54% total lung capacity 61% DLCO 46% which has not changed much from 2017 but was a change from August 2016 showed FEV1 85% to 90% predicted total lung capacity 67% predicted and the RV 15% predicted diffusion capacity 78% predicted  Most recent pulmonary function test June 2017 showed FEV1 54-55% predicted which is significant decline over 12 months from 90%  Total lung capacity 61% predicted, diffusion capacity 46% predicted which is significant decline from 78% predicted last year  Features of obstructive sleep apnea  Hypertension most likely related to underlying obstructive sleep apnea and diagnosed untreated        Recommendations:     IgE with zone 8 has been ordered, results pending  Wean steroids prednisone 6-day taper  Antibiotics de-escalated Unasyn   Currently on Augmentin p.o.    DuoNeb  Singulair  DVT prophylaxis Lovenox  GI prophylaxis Pepcid and Protonix     Discussed with patient initiating Ofev as an outpatient for lung fibrosis     CT chest at priority radiology 9/10/2022       LOS: 1 day     Subjective         Objective     Vital signs for last 24 hours:  Vitals:    09/14/22 1842 09/14/22 1846 09/14/22 2155 09/15/22 0459   BP:   136/84 125/79   BP Location:   Right arm Right arm   Patient Position:   Sitting Lying   Pulse: 89 90 96 77   Resp: 18 18 18 18   Temp:   98.2 °F (36.8 °C) 97.6 °F (36.4 °C)   TempSrc:   Oral Oral   SpO2: 99% 100% 94% 97%   Weight:    107 kg (236 lb 8.9 oz)   Height:           Intake/Output last 3 shifts:  I/O last 3 completed shifts:  In: 1140 [P.O.:1140]  Out: 2100 [Urine:2100]  Intake/Output this shift:  I/O this shift:  In: -   Out: 700 [Urine:700]      Radiology  Imaging Results (Last 24 Hours)     ** No results found for the last 24 hours. **          Labs:  Results from last 7 days   Lab Units 09/15/22  0256   WBC 10*3/mm3 9.30   HEMOGLOBIN g/dL 11.1*   HEMATOCRIT % 34.5   PLATELETS 10*3/mm3 286     Results from last 7 days   Lab Units  09/15/22  0256 09/13/22  0017 09/11/22 1955   SODIUM mmol/L 139   < > 138   POTASSIUM mmol/L 4.3   < > 4.3   CHLORIDE mmol/L 102   < > 101   CO2 mmol/L 27.0   < > 26.0   BUN mg/dL 28*   < > 10   CREATININE mg/dL 0.88   < > 0.71   CALCIUM mg/dL 8.9   < > 9.4   BILIRUBIN mg/dL  --   --  0.3   ALK PHOS U/L  --   --  104   ALT (SGPT) U/L  --   --  14   AST (SGOT) U/L  --   --  16   GLUCOSE mg/dL 110*   < > 136*    < > = values in this interval not displayed.     Results from last 7 days   Lab Units 09/11/22 2325   PH, ARTERIAL pH units 7.439   PO2 ART mm Hg 94.4   PCO2, ARTERIAL mm Hg 37.3   HCO3 ART mmol/L 25.3     Results from last 7 days   Lab Units 09/11/22 1955   ALBUMIN g/dL 3.90     Results from last 7 days   Lab Units 09/11/22 1955   TROPONIN T ng/mL <0.010                           Meds:   SCHEDULE  amoxicillin-clavulanate, 1 tablet, Oral, Q12H  cetirizine, 5 mg, Oral, Daily  citalopram, 40 mg, Oral, Daily  enoxaparin, 40 mg, Subcutaneous, BID  famotidine, 40 mg, Oral, Daily  ipratropium-albuterol, 3 mL, Nebulization, Q6H While Awake - RT  losartan, 100 mg, Oral, Daily  montelukast, 10 mg, Oral, Nightly  pantoprazole, 40 mg, Oral, Daily  predniSONE, 30 mg, Oral, Daily   Followed by  [START ON 9/16/2022] predniSONE, 20 mg, Oral, Daily   Followed by  [START ON 9/18/2022] predniSONE, 10 mg, Oral, Daily  sodium chloride, 3 mL, Intravenous, Q12H      Infusions     PRNs  benzonatate  •  hydrALAZINE  •  HYDROcodone Bit-Homatrop MBr  •  ibuprofen  •  melatonin  •  ondansetron **OR** ondansetron  •  [COMPLETED] Insert peripheral IV **AND** sodium chloride  •  sodium chloride  •  traMADol    Physical Exam:  Physical Exam  Vitals reviewed.   Pulmonary:      Breath sounds: Wheezing and rhonchi present.   Skin:     General: Skin is warm and dry.   Neurological:      Mental Status: She is alert and oriented to person, place, and time.         ROS  Review of Systems   Respiratory: Positive for cough and shortness of  breath.        I have reviewed the patient's new clinical results.    Electronically signed by ARACELI Mathews.

## 2022-09-15 NOTE — PLAN OF CARE
Goal Outcome Evaluation:           Progress: improving  Outcome Evaluation: Pt rested well through the night with no complaints. Pt is now on oral antibiotics and steroids. Pt is on 2L NC which she does where at home PRN. Anticipate discharge home, will continue to monitor.

## 2022-09-16 NOTE — CASE MANAGEMENT/SOCIAL WORK
Case Management Discharge Note      Final Note: Home         Selected Continued Care - Discharged on 9/15/2022 Admission date: 9/11/2022 - Discharge disposition: Home or Self Care   Transportation Services  Private: Car    Final Discharge Disposition Code: 01 - home or self-care

## 2022-09-16 NOTE — OUTREACH NOTE
Prep Survey    Flowsheet Row Responses   [facility] facility patient discharged from? [facility]   Is LACE score < 7 ? No   Emergency Room discharge w/ pulse ox? No   Eligibility Readm Mgmt   Discharge diagnosis Dyspnea   Does the patient have one of the following disease processes/diagnoses(primary or secondary)? Other   Does the patient have Home health ordered? No   Is there a DME ordered? No   Prep survey completed? Yes          ISSA GODINEZ - Registered Nurse

## 2022-09-20 ENCOUNTER — READMISSION MANAGEMENT (OUTPATIENT)
Dept: CALL CENTER | Facility: HOSPITAL | Age: 62
End: 2022-09-20

## 2022-09-20 LAB
A ALTERNATA IGE QN: <0.1 KU/L
A FUMIGATUS IGE QN: <0.1 KU/L
A PULLULANS IGE QN: NORMAL
AMER ROACH IGE QN: <0.1 KU/L
BERMUDA GRASS IGE QN: <0.1 KU/L
BOXELDER IGE QN: <0.1 KU/L
C HERBARUM IGE QN: <0.1 KU/L
CAT DANDER IGE QN: <0.1 KU/L
CHICKEN FEATHER IGE QN: NORMAL
CMN PIGWEED IGE QN: <0.1 KU/L
COMMON RAGWEED IGE QN: <0.1 KU/L
CONV CLASS DESCRIPTION: NORMAL
COTTONWOOD IGE QN: <0.1 KU/L
D FARINAE IGE QN: <0.1 KU/L
D PTERONYSS IGE QN: <0.1 KU/L
DOG DANDER IGE QN: <0.1 KU/L
DUCK FEATHER IGE QN: NORMAL
ENGL PLANTAIN IGE QN: <0.1 KU/L
F MONILIFORME IGE QN: NORMAL
GOOSE FEATHER IGE QN: NORMAL
GOOSEFOOT IGE QN: <0.1 KU/L
IGE SERPL-ACNC: 12 IU/ML (ref 6–495)
JOHNSON GRASS IGE QN: <0.1 KU/L
KENT BLUE GRASS IGE QN: <0.1 KU/L
LONDON PLANE IGE QN: NORMAL
M RACEMOSUS IGE QN: NORMAL
MARSH ELDER IGE QN: NORMAL
MT JUNIPER IGE QN: <0.1 KU/L
P NOTATUM IGE QN: <0.1 KU/L
R NIGRICANS IGE QN: <0.1 KU/L
S ROSTRATA IGE QN: NORMAL
SHEEP SORREL IGE QN: <0.1 KU/L
WHITE ASH IGE QN: NORMAL
WHITE ELM IGE QN: <0.1 KU/L
WHITE HICKORY IGE QN: NORMAL
WHITE OAK IGE QN: <0.1 KU/L
WORMWOOD IGE QN: NORMAL

## 2022-09-20 NOTE — OUTREACH NOTE
Medical Week 1 Survey    Flowsheet Row Responses   [facility] facility patient discharged from? [facility]   Does the patient have one of the following disease processes/diagnoses(primary or secondary)? Other   Week 1 attempt successful? No   Unsuccessful attempts Attempt 1          ARLENE BALBUENA - Licensed Nurse

## 2022-09-23 ENCOUNTER — READMISSION MANAGEMENT (OUTPATIENT)
Dept: CALL CENTER | Facility: HOSPITAL | Age: 62
End: 2022-09-23

## 2022-09-23 NOTE — OUTREACH NOTE
Medical Week 1 Survey    Flowsheet Row Responses   [facility] facility patient discharged from? [facility]   Does the patient have one of the following disease processes/diagnoses(primary or secondary)? Other   Week 1 attempt successful? No   Unsuccessful attempts Attempt 2          CHINO WESTBROOK - Registered Nurse

## 2022-09-24 ENCOUNTER — APPOINTMENT (OUTPATIENT)
Dept: GENERAL RADIOLOGY | Facility: HOSPITAL | Age: 62
End: 2022-09-24

## 2022-09-24 ENCOUNTER — HOSPITAL ENCOUNTER (EMERGENCY)
Facility: HOSPITAL | Age: 62
Discharge: HOME | End: 2022-09-24
Attending: EMERGENCY MEDICINE | Admitting: EMERGENCY MEDICINE

## 2022-09-24 VITALS
BODY MASS INDEX: 43.29 KG/M2 | OXYGEN SATURATION: 99 % | SYSTOLIC BLOOD PRESSURE: 130 MMHG | HEART RATE: 93 BPM | WEIGHT: 229.28 LBS | RESPIRATION RATE: 16 BRPM | DIASTOLIC BLOOD PRESSURE: 75 MMHG | TEMPERATURE: 98.1 F | HEIGHT: 61 IN

## 2022-09-24 DIAGNOSIS — R07.9 CHEST PAIN, UNSPECIFIED TYPE: Primary | ICD-10-CM

## 2022-09-24 LAB
ANION GAP SERPL CALCULATED.3IONS-SCNC: 12 MMOL/L (ref 5–15)
BASOPHILS # BLD AUTO: 0 10*3/MM3 (ref 0–0.2)
BASOPHILS NFR BLD AUTO: 0.6 % (ref 0–1.5)
BUN SERPL-MCNC: 11 MG/DL (ref 8–23)
BUN/CREAT SERPL: 15.9 (ref 7–25)
CALCIUM SPEC-SCNC: 9.2 MG/DL (ref 8.6–10.5)
CHLORIDE SERPL-SCNC: 101 MMOL/L (ref 98–107)
CO2 SERPL-SCNC: 26 MMOL/L (ref 22–29)
CREAT SERPL-MCNC: 0.69 MG/DL (ref 0.57–1)
DEPRECATED RDW RBC AUTO: 47.7 FL (ref 37–54)
EGFRCR SERPLBLD CKD-EPI 2021: 98.3 ML/MIN/1.73
EOSINOPHIL # BLD AUTO: 0.1 10*3/MM3 (ref 0–0.4)
EOSINOPHIL NFR BLD AUTO: 1.4 % (ref 0.3–6.2)
ERYTHROCYTE [DISTWIDTH] IN BLOOD BY AUTOMATED COUNT: 15.5 % (ref 12.3–15.4)
GLUCOSE SERPL-MCNC: 131 MG/DL (ref 65–99)
HCT VFR BLD AUTO: 39.8 % (ref 34–46.6)
HGB BLD-MCNC: 12.7 G/DL (ref 12–15.9)
LYMPHOCYTES # BLD AUTO: 0.9 10*3/MM3 (ref 0.7–3.1)
LYMPHOCYTES NFR BLD AUTO: 14.3 % (ref 19.6–45.3)
MCH RBC QN AUTO: 28.1 PG (ref 26.6–33)
MCHC RBC AUTO-ENTMCNC: 31.9 G/DL (ref 31.5–35.7)
MCV RBC AUTO: 88.2 FL (ref 79–97)
MONOCYTES # BLD AUTO: 0.7 10*3/MM3 (ref 0.1–0.9)
MONOCYTES NFR BLD AUTO: 10.2 % (ref 5–12)
NEUTROPHILS NFR BLD AUTO: 4.8 10*3/MM3 (ref 1.7–7)
NEUTROPHILS NFR BLD AUTO: 73.5 % (ref 42.7–76)
NRBC BLD AUTO-RTO: 0.1 /100 WBC (ref 0–0.2)
PLATELET # BLD AUTO: 233 10*3/MM3 (ref 140–450)
PMV BLD AUTO: 8.6 FL (ref 6–12)
POTASSIUM SERPL-SCNC: 4.1 MMOL/L (ref 3.5–5.2)
RBC # BLD AUTO: 4.51 10*6/MM3 (ref 3.77–5.28)
SODIUM SERPL-SCNC: 139 MMOL/L (ref 136–145)
TROPONIN T SERPL-MCNC: <0.01 NG/ML (ref 0–0.03)
WBC NRBC COR # BLD: 6.5 10*3/MM3 (ref 3.4–10.8)

## 2022-09-24 PROCEDURE — 99283 EMERGENCY DEPT VISIT LOW MDM: CPT

## 2022-09-24 PROCEDURE — 71045 X-RAY EXAM CHEST 1 VIEW: CPT

## 2022-09-24 PROCEDURE — 25010000002 KETOROLAC TROMETHAMINE PER 15 MG: Performed by: EMERGENCY MEDICINE

## 2022-09-24 PROCEDURE — 93005 ELECTROCARDIOGRAM TRACING: CPT

## 2022-09-24 PROCEDURE — 85025 COMPLETE CBC W/AUTO DIFF WBC: CPT | Performed by: EMERGENCY MEDICINE

## 2022-09-24 PROCEDURE — 96374 THER/PROPH/DIAG INJ IV PUSH: CPT

## 2022-09-24 PROCEDURE — 84484 ASSAY OF TROPONIN QUANT: CPT | Performed by: EMERGENCY MEDICINE

## 2022-09-24 PROCEDURE — 36415 COLL VENOUS BLD VENIPUNCTURE: CPT

## 2022-09-24 PROCEDURE — 80048 BASIC METABOLIC PNL TOTAL CA: CPT | Performed by: EMERGENCY MEDICINE

## 2022-09-24 RX ORDER — KETOROLAC TROMETHAMINE 15 MG/ML
15 INJECTION, SOLUTION INTRAMUSCULAR; INTRAVENOUS ONCE
Status: COMPLETED | OUTPATIENT
Start: 2022-09-24 | End: 2022-09-24

## 2022-09-24 RX ORDER — SODIUM CHLORIDE 0.9 % (FLUSH) 0.9 %
10 SYRINGE (ML) INJECTION AS NEEDED
Status: DISCONTINUED | OUTPATIENT
Start: 2022-09-24 | End: 2022-09-24 | Stop reason: HOSPADM

## 2022-09-24 RX ORDER — TRAMADOL HYDROCHLORIDE 50 MG/1
50 TABLET ORAL EVERY 6 HOURS PRN
Qty: 12 TABLET | Refills: 0 | Status: SHIPPED | OUTPATIENT
Start: 2022-09-24

## 2022-09-24 RX ADMIN — KETOROLAC TROMETHAMINE 15 MG: 15 INJECTION, SOLUTION INTRAMUSCULAR; INTRAVENOUS at 12:38

## 2022-09-24 NOTE — ED PROVIDER NOTES
Subjective   History of Present Illness  Patient is a 62-year-old female complaint of sharp pain in her right chest that developed over the past several days.  The pain is worse on deep inspiration and coughing as well as certain positions and motions.  The pain is moderate and constant.        Review of Systems   Constitutional: Negative for chills and fever.   HENT: Negative for congestion and sore throat.    Eyes: Negative for visual disturbance.   Respiratory: Negative for cough and shortness of breath.    Cardiovascular: Positive for chest pain. Negative for palpitations.   Gastrointestinal: Negative for abdominal pain, diarrhea and vomiting.   Endocrine: Negative for polyuria.   Genitourinary: Negative for dysuria and flank pain.   Musculoskeletal: Negative for back pain.   Neurological: Negative for dizziness and headaches.   A complete review of systems was obtained and is otherwise negative    Past Medical History:   Diagnosis Date   • GERD (gastroesophageal reflux disease)    • Hypertension    • Interstitial lung disease (HCC)    • Intraocular pressure increase, bilateral    • Rheumatoid arthritis (HCC)        Allergies   Allergen Reactions   • Oxycodone-Acetaminophen Rash       Past Surgical History:   Procedure Laterality Date   • ACHILLES TENDON SURGERY  2009   • APPENDECTOMY  1999   • BRONCHOSCOPY N/A 1/29/2022    Procedure: BRONCHOSCOPY with lung washing;  Surgeon: Dana Garcia MD;  Location: [facility] ENDOSCOPY;  Service: Pulmonary;  Laterality: N/A;  post op: pneumonia   • CARPAL TUNNEL RELEASE Right 1994   • TOTAL SHOULDER REVERSE ARTHROPLASTY Right 06/05/2018   • TUBAL ABDOMINAL LIGATION  2004       Family History   Problem Relation Age of Onset   • Hypertension Mother    • Heart disease Mother    • COPD Mother    • Hypertension Father    • Diabetes Father    • Stroke Father        Social History     Socioeconomic History   • Marital status:    Tobacco Use   • Smoking status: Never Smoker   •  Smokeless tobacco: Never Used   Vaping Use   • Vaping Use: Never used   Substance and Sexual Activity   • Alcohol use: Yes     Comment: wine occasionally   • Drug use: No   • Sexual activity: Defer           Objective   Physical Exam  HEENT exam shows TMs to be clear.  Oropharynx comers.  Sclera nonicteric.  Neck has no adenopathy JVD or bruits.  Lungs are clear.  Heart has regular rate rhythm without murmur rub or gallop.  Chest is tender palpation of right posterior lateral chest wall.  There is no crepitus or subcu air.  Abdomen is soft nontender.  She has normal bowel sounds without rebound or guarding.  Back has no CVA tenderness.  Extremity exam has no cyanosis or edema.  Procedures     My EKG interpretation shows normal sinus rhythm at a rate of 109 with no acute ST change      ED Course      Results for orders placed or performed during the hospital encounter of 09/24/22   Basic Metabolic Panel    Specimen: Blood   Result Value Ref Range    Glucose 131 (H) 65 - 99 mg/dL    BUN 11 8 - 23 mg/dL    Creatinine 0.69 0.57 - 1.00 mg/dL    Sodium 139 136 - 145 mmol/L    Potassium 4.1 3.5 - 5.2 mmol/L    Chloride 101 98 - 107 mmol/L    CO2 26.0 22.0 - 29.0 mmol/L    Calcium 9.2 8.6 - 10.5 mg/dL    BUN/Creatinine Ratio 15.9 7.0 - 25.0    Anion Gap 12.0 5.0 - 15.0 mmol/L    eGFR 98.3 >60.0 mL/min/1.73   Troponin    Specimen: Blood   Result Value Ref Range    Troponin T <0.010 0.000 - 0.030 ng/mL   CBC Auto Differential    Specimen: Blood   Result Value Ref Range    WBC 6.50 3.40 - 10.80 10*3/mm3    RBC 4.51 3.77 - 5.28 10*6/mm3    Hemoglobin 12.7 12.0 - 15.9 g/dL    Hematocrit 39.8 34.0 - 46.6 %    MCV 88.2 79.0 - 97.0 fL    MCH 28.1 26.6 - 33.0 pg    MCHC 31.9 31.5 - 35.7 g/dL    RDW 15.5 (H) 12.3 - 15.4 %    RDW-SD 47.7 37.0 - 54.0 fl    MPV 8.6 6.0 - 12.0 fL    Platelets 233 140 - 450 10*3/mm3    Neutrophil % 73.5 42.7 - 76.0 %    Lymphocyte % 14.3 (L) 19.6 - 45.3 %    Monocyte % 10.2 5.0 - 12.0 %    Eosinophil %  1.4 0.3 - 6.2 %    Basophil % 0.6 0.0 - 1.5 %    Neutrophils, Absolute 4.80 1.70 - 7.00 10*3/mm3    Lymphocytes, Absolute 0.90 0.70 - 3.10 10*3/mm3    Monocytes, Absolute 0.70 0.10 - 0.90 10*3/mm3    Eosinophils, Absolute 0.10 0.00 - 0.40 10*3/mm3    Basophils, Absolute 0.00 0.00 - 0.20 10*3/mm3    nRBC 0.1 0.0 - 0.2 /100 WBC   ECG 12 Lead   Result Value Ref Range    QT Interval 321 ms     XR Chest 1 View    Result Date: 9/24/2022  No interval change in the fine reticular pattern of increased interstitial markings presumed to represent chronic interstitial lung disease.  Electronically Signed By-Mynor Patrick MD On:9/24/2022 12:00 PM This report was finalized on [id number] by  Mynor Patrick MD.                                           MDM  Number of Diagnoses or Management Options  Diagnosis management comments: Chest x-ray shows no cardiomegaly fusion or infiltrate.  Patient has no evidence of acute coronary syndrome based on EKG and troponin.  Metabolic panel is normal with no renal insufficiency or electrolyte abnormality.  There is no evidence acute infectious process including pneumonia.  She does have reproducible pain on palpation.  Patient will be discharged some musculoskeletal chest pain.  She will be placed on Ultram.  She will follow with her MD for recheck as scheduled this week.       Amount and/or Complexity of Data Reviewed  Clinical lab tests: reviewed  Tests in the radiology section of CPT®: reviewed  Tests in the medicine section of CPT®: reviewed    Risk of Complications, Morbidity, and/or Mortality  Presenting problems: high  Diagnostic procedures: high  Management options: high    Patient Progress  Patient progress: stable      Final diagnoses:   Chest pain, unspecified type       ED Disposition  ED Disposition     ED Disposition   Discharge    Condition   Stable    Comment   --             No follow-up provider specified.       Medication List      Changed    * traMADol 50 MG tablet  Commonly  known as: ULTRAM  What changed: Another medication with the same name was added. Make sure you understand how and when to take each.     * traMADol 50 MG tablet  Commonly known as: ULTRAM  Take 1 tablet by mouth Every 6 (Six) Hours As Needed for Severe Pain.  What changed: You were already taking a medication with the same name, and this prescription was added. Make sure you understand how and when to take each.         * This list has 2 medication(s) that are the same as other medications prescribed for you. Read the directions carefully, and ask your doctor or other care provider to review them with you.               Where to Get Your Medications      Information about where to get these medications is not yet available    Ask your nurse or doctor about these medications  · traMADol 50 MG tablet          Yonny Garzon MD  09/24/22 9399

## 2022-09-27 ENCOUNTER — READMISSION MANAGEMENT (OUTPATIENT)
Dept: CALL CENTER | Facility: HOSPITAL | Age: 62
End: 2022-09-27

## 2022-09-27 LAB — QT INTERVAL: 321 MS

## 2022-09-27 NOTE — OUTREACH NOTE
Medical Week 1 Survey    Flowsheet Row Responses   [facility] patient discharged from? [facility]   Does the patient have one of the following disease processes/diagnoses(primary or secondary)? Other   Week 1 attempt successful? Yes   Call start time 1255   Call end time 1259   Discharge diagnosis Dyspnea   Meds reviewed with patient/caregiver? Yes   Is the patient having any side effects they believe may be caused by any medication additions or changes? No   Does the patient have all medications ordered at discharge? Yes   Is the patient taking all medications as directed (includes completed medication regime)? Yes   Does the patient have a primary care provider?  Yes   Does the patient have an appointment with their PCP within 7 days of discharge? Yes   Has the patient kept scheduled appointments due by today? Yes   Has home health visited the patient within 72 hours of discharge? N/A   Psychosocial issues? No   Did the patient receive a copy of their discharge instructions? Yes   Nursing interventions Reviewed instructions with patient   What is the patient's perception of their health status since discharge? Improving   Is the patient/caregiver able to teach back signs and symptoms related to disease process for when to call PCP? Yes   Is the patient/caregiver able to teach back signs and symptoms related to disease process for when to call 911? Yes   Is the patient/caregiver able to teach back the hierarchy of who to call/visit for symptoms/problems? PCP, Specialist, Home health nurse, Urgent Care, ED, 911 Yes   Additional teach back comments states had been feeling better, but recently has had some SOB with recurring cough, has starting using 2L O2 , is currently in PCP office and plans to discuss symptoms   Week 1 call completed? Yes          SARAN BALBUENA - Registered Nurse

## 2022-10-06 ENCOUNTER — READMISSION MANAGEMENT (OUTPATIENT)
Dept: CALL CENTER | Facility: HOSPITAL | Age: 62
End: 2022-10-06

## 2022-10-06 NOTE — OUTREACH NOTE
Medical Week 2 Survey    Flowsheet Row Responses   [facility] patient discharged from? [facility]   Does the patient have one of the following disease processes/diagnoses(primary or secondary)? Other   Week 2 attempt successful? Yes   Call start time 1555   Discharge diagnosis Dyspnea, ILD    Call end time 1605   Person spoke with today (if not patient) and relationship patient   Meds reviewed with patient/caregiver? Yes   Does the patient have all medications ordered at discharge? Yes   Is the patient taking all medications as directed (includes completed medication regime)? Yes   Medication comments Patient reports that Dr Garcia has ordered high dose steroid therapy for her.    Does the patient have a primary care provider?  Yes   Has the patient kept scheduled appointments due by today? Yes   Comments Patient has had follow up with pulmonary, Dr Garcia since discharge.    Has home health visited the patient within 72 hours of discharge? N/A   DME comments Patient wearing O22L all the time right now.    Psychosocial issues? No   Did the patient receive a copy of their discharge instructions? Yes   Nursing interventions Reviewed instructions with patient   What is the patient's perception of their health status since discharge? Same  [patient reports that after discharge and steroids completed, she worsened. Has now had follow up and pulmnonary ordered high dose steroid and she is seeing improvement again. ]   Is the patient/caregiver able to teach back signs and symptoms related to disease process for when to call PCP? Yes   Is the patient/caregiver able to teach back signs and symptoms related to disease process for when to call 911? Yes   Is the patient/caregiver able to teach back the hierarchy of who to call/visit for symptoms/problems? PCP, Specialist, Home health nurse, Urgent Care, ED, 911 Yes   If the patient is a current smoker, are they able to teach back resources for cessation? Not a smoker   Week 2 Call  Completed? Yes          JESSICA BAUGH - Registered Nurse

## 2022-10-18 ENCOUNTER — READMISSION MANAGEMENT (OUTPATIENT)
Dept: CALL CENTER | Facility: HOSPITAL | Age: 62
End: 2022-10-18

## 2022-10-18 NOTE — OUTREACH NOTE
Medical Week 4 Survey    Flowsheet Row Responses   [facility] patient discharged from? [facility]   Does the patient have one of the following disease processes/diagnoses(primary or secondary)? Other   Week 4 attempt successful? Yes   Call start time 1440   Call end time 1441   Discharge diagnosis Dyspnea, ILD    Meds reviewed with patient/caregiver? Yes   Is the patient having any side effects they believe may be caused by any medication additions or changes? No   Is the patient taking all medications as directed (includes completed medication regime)? Yes   Has the patient kept scheduled appointments due by today? Yes   Is the patient still receiving Home Health Services? N/A   Psychosocial issues? No   What is the patient's perception of their health status since discharge? Improving   Week 4 Call Completed? Yes   Would the patient like one additional call? No   Graduated Yes   Is the patient interested in additional calls from an ambulatory ?  NOTE:  applies to high risk patients requiring additional follow-up. No   Did the patient feel the follow up calls were helpful during their recovery period? Yes          DEEP BALBUENA - Registered Nurse

## 2022-10-19 ENCOUNTER — APPOINTMENT (OUTPATIENT)
Dept: CT IMAGING | Facility: HOSPITAL | Age: 62
End: 2022-10-19

## 2022-10-19 ENCOUNTER — HOSPITAL ENCOUNTER (EMERGENCY)
Facility: HOSPITAL | Age: 62
Discharge: HOME | End: 2022-10-19
Attending: EMERGENCY MEDICINE | Admitting: EMERGENCY MEDICINE

## 2022-10-19 VITALS
HEIGHT: 61 IN | DIASTOLIC BLOOD PRESSURE: 79 MMHG | HEART RATE: 94 BPM | OXYGEN SATURATION: 93 % | RESPIRATION RATE: 17 BRPM | TEMPERATURE: 98 F | SYSTOLIC BLOOD PRESSURE: 147 MMHG | BODY MASS INDEX: 43.43 KG/M2 | WEIGHT: 230 LBS

## 2022-10-19 DIAGNOSIS — S22.31XA CLOSED FRACTURE OF ONE RIB OF RIGHT SIDE, INITIAL ENCOUNTER: Primary | ICD-10-CM

## 2022-10-19 PROCEDURE — 25010000002 ONDANSETRON PER 1 MG: Performed by: EMERGENCY MEDICINE

## 2022-10-19 PROCEDURE — 99283 EMERGENCY DEPT VISIT LOW MDM: CPT

## 2022-10-19 PROCEDURE — 25010000002 HYDROMORPHONE 1 MG/ML SOLUTION: Performed by: EMERGENCY MEDICINE

## 2022-10-19 PROCEDURE — 96374 THER/PROPH/DIAG INJ IV PUSH: CPT

## 2022-10-19 PROCEDURE — 96375 TX/PRO/DX INJ NEW DRUG ADDON: CPT

## 2022-10-19 PROCEDURE — 71250 CT THORAX DX C-: CPT

## 2022-10-19 RX ORDER — LIDOCAINE 50 MG/G
1 PATCH TOPICAL EVERY 24 HOURS
Qty: 10 PATCH | Refills: 0 | Status: SHIPPED | OUTPATIENT
Start: 2022-10-19

## 2022-10-19 RX ORDER — HYDROCODONE BITARTRATE AND ACETAMINOPHEN 5; 325 MG/1; MG/1
1 TABLET ORAL EVERY 6 HOURS PRN
Qty: 12 TABLET | Refills: 0 | Status: SHIPPED | OUTPATIENT
Start: 2022-10-19

## 2022-10-19 RX ORDER — ONDANSETRON 2 MG/ML
4 INJECTION INTRAMUSCULAR; INTRAVENOUS ONCE
Status: COMPLETED | OUTPATIENT
Start: 2022-10-19 | End: 2022-10-19

## 2022-10-19 RX ORDER — SODIUM CHLORIDE 0.9 % (FLUSH) 0.9 %
10 SYRINGE (ML) INJECTION AS NEEDED
Status: DISCONTINUED | OUTPATIENT
Start: 2022-10-19 | End: 2022-10-19 | Stop reason: HOSPADM

## 2022-10-19 RX ADMIN — HYDROMORPHONE HYDROCHLORIDE 0.5 MG: 1 INJECTION, SOLUTION INTRAMUSCULAR; INTRAVENOUS; SUBCUTANEOUS at 16:08

## 2022-10-19 RX ADMIN — ONDANSETRON 4 MG: 2 INJECTION INTRAMUSCULAR; INTRAVENOUS at 16:08

## 2022-10-19 NOTE — DISCHARGE INSTRUCTIONS
May use a pillow to painful area for support, follow-up Dr. George, return new or worsening symptoms

## 2022-10-19 NOTE — ED PROVIDER NOTES
Subjective   History of Present Illness  67-year-old female presents with right posterior chest pain.  She has had cough.  She has been having problems for about a month but the pain has become more severe.  She is currently on steroids for chronic interstitial lung disease.  She has had chronic cough.  She has had no fever.  She has had sputum production.  She does not complain of being short of breath states that that has improved since she has been on steroids.  She has had no fall or injury.  She has no abdominal pain.  Review of Systems  Negative for fever positive for cough negative for abdominal pain vomiting dysuria, leg pain or edema  Past Medical History:   Diagnosis Date   • GERD (gastroesophageal reflux disease)    • Hypertension    • Interstitial lung disease (HCC)    • Intraocular pressure increase, bilateral    • Rheumatoid arthritis (HCC)        Allergies   Allergen Reactions   • Oxycodone-Acetaminophen Rash       Past Surgical History:   Procedure Laterality Date   • ACHILLES TENDON SURGERY  2009   • APPENDECTOMY  1999   • BRONCHOSCOPY N/A 1/29/2022    Procedure: BRONCHOSCOPY with lung washing;  Surgeon: Dana Garcia MD;  Location: [facility] ENDOSCOPY;  Service: Pulmonary;  Laterality: N/A;  post op: pneumonia   • CARPAL TUNNEL RELEASE Right 1994   • TOTAL SHOULDER REVERSE ARTHROPLASTY Right 06/05/2018   • TUBAL ABDOMINAL LIGATION  2004       Family History   Problem Relation Age of Onset   • Hypertension Mother    • Heart disease Mother    • COPD Mother    • Hypertension Father    • Diabetes Father    • Stroke Father        Social History     Socioeconomic History   • Marital status:    Tobacco Use   • Smoking status: Never   • Smokeless tobacco: Never   Vaping Use   • Vaping Use: Never used   Substance and Sexual Activity   • Alcohol use: Yes     Comment: wine occasionally   • Drug use: No   • Sexual activity: Defer     Prior to Admission medications    Medication Sig Start Date End Date  Taking? Authorizing Provider   acetaminophen (TYLENOL) 650 MG 8 hr tablet Take 650 mg by mouth Every 8 (Eight) Hours As Needed for Mild Pain .    Emergency, Nurse ROMANA Greene   albuterol (PROVENTIL) (2.5 MG/3ML) 0.083% nebulizer solution Take 2.5 mg by nebulization Every 6 (Six) Hours As Needed for Wheezing or Shortness of Air. 7/18/17   Leti Fernando MD   albuterol sulfate HFA (ProAir HFA) 108 (90 Base) MCG/ACT inhaler Inhale 2 puffs 4 (Four) Times a Day. 1/29/22   Georgie Jacobsen APRN   ascorbic acid (VITAMIN C) 500 MG tablet Take 1 tablet by mouth Daily. 1/30/22   Georgie Jacobsen APRN   B Complex Vitamins (VITAMIN B COMPLEX) capsule capsule Take 1 capsule by mouth Daily. 4/20/17   Leti Fernando MD   benzonatate (Tessalon Perles) 100 MG capsule Take 100 mg by mouth 3 (Three) Times a Day As Needed for Cough.    Leti Fernando MD   Calcium Citrate-Vitamin D 200-125 MG-UNIT tablet Take 1 tablet by mouth Daily. 10/9/17   Leti Fernando MD   citalopram (CeleXA) 40 MG tablet Take 1 tablet by mouth Every Morning. 6/5/20   Jose Stern MD   famotidine (PEPCID) 40 MG tablet Take 40 mg by mouth Daily.    Leti Fernando MD   fexofenadine (ALLEGRA) 180 MG tablet Take 180 mg by mouth Daily.    Leti Fernando MD   fluticasone (FLONASE) 50 MCG/ACT nasal spray 2 sprays into the nostril(s) as directed by provider Daily. 4/18/19   Leti Fernando MD   fluticasone-salmeterol (ADVAIR) 500-50 MCG/DOSE DISKUS Inhale 1 puff 2 (Two) Times a Day. 4/30/21   Emergency, Nurse Epic, RN   guaiFENesin (MUCINEX) 600 MG 12 hr tablet Take 1,200 mg by mouth 2 (Two) Times a Day.    Leti Fernando MD   ipratropium-albuterol (DUO-NEB) 0.5-2.5 mg/3 ml nebulizer Take 3 mL by nebulization Every 6 (Six) Hours While Awake. 9/15/22   Georgie Jacobsen APRN   losartan (Cozaar) 100 MG tablet Take 1 tablet by mouth Daily. 6/5/20   Jose Stern MD   montelukast (SINGULAIR) 10 MG  "tablet Take 10 mg by mouth Every Night.    Emergency, Nurse Epic, RN   Multiple Vitamins-Minerals (EYE VITAMINS & MINERALS) tablet Take 2 tablets by mouth Daily. 4/9/18   Provider, MD Leti   pantoprazole (PROTONIX) 40 MG EC tablet Take 40 mg by mouth Daily. 5/21/18   Provider, MD Leti   traMADol (ULTRAM) 50 MG tablet TAKE 2 TABLETS BY MOUTH TWICE DAILY AS NEEDED 5/25/22   Emergency, Nurse Jc RN   traMADol (ULTRAM) 50 MG tablet Take 1 tablet by mouth Every 6 (Six) Hours As Needed for Severe Pain. 9/24/22   Yonny Garzon MD   zinc sulfate (ZINCATE) 220 (50 Zn) MG capsule Take 1 capsule by mouth Daily. 1/30/22   Georgie Jacobsen, ARACELI             Objective   Physical Exam  67-year-old female awake alert.  Generally overweight.  Pupils equal round at light.  Neck supple chest reveals bilateral rhonchi wheezes.  She is noted to have posterior lateral right rib pain inferiorly.  With movement it was felt that there was a pop.  Cardiovascular regular rhythm abdomen is soft nontender.  Legs without tenderness or edema.  Procedures           ED Course        CT Chest Without Contrast Diagnostic    Result Date: 10/19/2022   1. Acute or subacute nondisplaced right eighth rib fracture posteriorly. 2. Scattered coarse interstitial fibrosis within both lungs, thought to be related to sites of prior airspace disease seen on the prior CT chest of 1/25/2022.    Electronically Signed By-Lavonne Velazquez MD On:10/19/2022 4:27 PM This report was finalized on [id number] by  Lavonne Velazquez MD.    Medications   sodium chloride 0.9 % flush 10 mL (has no administration in time range)   HYDROmorphone (DILAUDID) injection 0.5 mg (0.5 mg Intravenous Given 10/19/22 1608)   ondansetron (ZOFRAN) injection 4 mg (4 mg Intravenous Given 10/19/22 1608)     /83 (BP Location: Left arm, Patient Position: Sitting)   Pulse 100   Temp 97.8 °F (36.6 °C) (Temporal)   Resp 18   Ht 154.9 cm (61\")   Wt 104 kg (230 lb)   SpO2 95%  "  BMI 43.46 kg/m²                                        MDM  Patient IV placed.  Was given Dilaudid for pain.  I reviewed CT scan of chest.  There is acute to subacute nondisplaced right eighth rib fracture posteriorly.  There are scattered coarse interstitial fibrotic changes within both lungs.  There is no pneumonia.  Patient's findings were discussed with her.  Her inspect report was reviewed.  She was discharged given lidocaine patch and Norco for pain.  Advised to follow-up Dr. George, return new or worsening symptoms  Final diagnoses:   Closed fracture of one rib of right side, initial encounter       ED Disposition  ED Disposition     ED Disposition   Discharge    Condition   Stable    Comment   --             No follow-up provider specified.       Medication List      New Prescriptions    HYDROcodone-acetaminophen 5-325 MG per tablet  Commonly known as: Norco  Take 1 tablet by mouth Every 6 (Six) Hours As Needed for Moderate Pain.     lidocaine 5 %  Commonly known as: LIDODERM  Place 1 patch on the skin as directed by provider Daily. Remove & Discard patch within 12 hours or as directed by MD           Where to Get Your Medications      These medications were sent to [id number]  [place], [place] - [address] [facility] [address] [phone] [place] [phone]   [address]  [place] [address]    Phone: [phone]   · HYDROcodone-acetaminophen 5-325 MG per tablet  · lidocaine 5 %          Aguilar Ferreira MD  10/19/22 [id number]

## 2022-10-19 NOTE — ED NOTES
Pt c/o pain in right side radiating to her back x 2 weeks worse today denies fever, or dysuria. Hx of interstital lung disease and sees Dr Garcia. Pt reports productive cough with yellow sputum

## 2023-07-31 ENCOUNTER — OFFICE (AMBULATORY)
Dept: URBAN - METROPOLITAN AREA CLINIC 64 | Facility: CLINIC | Age: 63
End: 2023-07-31

## 2023-07-31 VITALS
WEIGHT: 243 LBS | DIASTOLIC BLOOD PRESSURE: 79 MMHG | HEART RATE: 80 BPM | HEIGHT: 61 IN | SYSTOLIC BLOOD PRESSURE: 128 MMHG

## 2023-07-31 DIAGNOSIS — R63.5 ABNORMAL WEIGHT GAIN: ICD-10-CM

## 2023-07-31 DIAGNOSIS — R05.3 CHRONIC COUGH: ICD-10-CM

## 2023-07-31 DIAGNOSIS — K21.00 GASTRO-ESOPHAGEAL REFLUX DISEASE WITH ESOPHAGITIS, WITHOUT B: ICD-10-CM

## 2023-07-31 DIAGNOSIS — R12 HEARTBURN: ICD-10-CM

## 2023-07-31 DIAGNOSIS — E66.9 OBESITY, UNSPECIFIED: ICD-10-CM

## 2023-07-31 PROBLEM — R05 CHRONIC COUGH: Status: ACTIVE | Noted: 2018-05-21

## 2023-07-31 PROCEDURE — 99214 OFFICE O/P EST MOD 30 MIN: CPT | Performed by: INTERNAL MEDICINE

## 2023-07-31 RX ORDER — PANTOPRAZOLE 40 MG/1
TABLET, DELAYED RELEASE ORAL
Qty: 180 | Refills: 3 | Status: ACTIVE

## 2024-01-23 ENCOUNTER — TRANSCRIBE ORDERS (OUTPATIENT)
Dept: ADMINISTRATIVE | Facility: HOSPITAL | Age: 64
End: 2024-01-23
Payer: MEDICARE

## 2024-01-23 DIAGNOSIS — R06.02 SHORTNESS OF BREATH: Primary | ICD-10-CM

## 2024-03-28 ENCOUNTER — HOSPITAL ENCOUNTER (OUTPATIENT)
Dept: RESPIRATORY THERAPY | Facility: HOSPITAL | Age: 64
Discharge: HOME | End: 2024-03-28
Payer: MEDICARE

## 2024-03-28 VITALS — HEART RATE: 83 BPM | OXYGEN SATURATION: 97 % | RESPIRATION RATE: 17 BRPM

## 2024-03-28 DIAGNOSIS — R06.02 SHORTNESS OF BREATH: ICD-10-CM

## 2024-03-28 PROCEDURE — 94060 EVALUATION OF WHEEZING: CPT

## 2024-03-28 PROCEDURE — 94727 GAS DIL/WSHOT DETER LNG VOL: CPT

## 2024-03-28 PROCEDURE — 94799 UNLISTED PULMONARY SVC/PX: CPT

## 2024-03-28 PROCEDURE — 94729 DIFFUSING CAPACITY: CPT

## 2024-03-28 PROCEDURE — 94664 DEMO&/EVAL PT USE INHALER: CPT

## 2024-03-28 RX ORDER — ALBUTEROL SULFATE 90 UG/1
2 AEROSOL, METERED RESPIRATORY (INHALATION) ONCE
Status: COMPLETED | OUTPATIENT
Start: 2024-03-28 | End: 2024-03-28

## 2024-03-28 RX ADMIN — ALBUTEROL SULFATE 2 PUFF: 108 AEROSOL, METERED RESPIRATORY (INHALATION) at 10:55

## 2024-05-19 ENCOUNTER — APPOINTMENT (OUTPATIENT)
Dept: GENERAL RADIOLOGY | Facility: HOSPITAL | Age: 64
End: 2024-05-19
Payer: MEDICARE

## 2024-05-19 ENCOUNTER — HOSPITAL ENCOUNTER (EMERGENCY)
Facility: HOSPITAL | Age: 64
Discharge: HOME | End: 2024-05-19
Attending: EMERGENCY MEDICINE | Admitting: EMERGENCY MEDICINE
Payer: MEDICARE

## 2024-05-19 VITALS
DIASTOLIC BLOOD PRESSURE: 72 MMHG | SYSTOLIC BLOOD PRESSURE: 162 MMHG | OXYGEN SATURATION: 98 % | HEART RATE: 76 BPM | HEIGHT: 61 IN | RESPIRATION RATE: 16 BRPM | TEMPERATURE: 98.4 F | BODY MASS INDEX: 45.2 KG/M2 | WEIGHT: 239.42 LBS

## 2024-05-19 DIAGNOSIS — M25.562 ACUTE PAIN OF LEFT KNEE: Primary | ICD-10-CM

## 2024-05-19 PROCEDURE — 73562 X-RAY EXAM OF KNEE 3: CPT

## 2024-05-19 PROCEDURE — 99283 EMERGENCY DEPT VISIT LOW MDM: CPT

## 2024-05-19 NOTE — DISCHARGE INSTRUCTIONS
May use Tylenol for pain.  Continue with activity as tolerated.  Ice to knee intermittently for pain as needed.  Return new or worsening symptoms

## 2024-05-19 NOTE — ED PROVIDER NOTES
Subjective   History of Present Illness  60-year-old female presents with left knee pain.  She states knee gave out on her about 2-1/2 weeks ago.  Yesterday while making the bed knee did same.  She presents with pain to her knee.  She complains of some hip pain but this is not new.  She had no other injury.  Review of Systems    Past Medical History:   Diagnosis Date    GERD (gastroesophageal reflux disease)     Hypertension     Interstitial lung disease     Intraocular pressure increase, bilateral     Rheumatoid arthritis        Allergies   Allergen Reactions    Oxycodone-Acetaminophen Rash       Past Surgical History:   Procedure Laterality Date    ACHILLES TENDON SURGERY  2009    APPENDECTOMY  1999    BRONCHOSCOPY N/A 1/29/2022    Procedure: BRONCHOSCOPY with lung washing;  Surgeon: Dana Garcia MD;  Location: [facility] ENDOSCOPY;  Service: Pulmonary;  Laterality: N/A;  post op: pneumonia    CARPAL TUNNEL RELEASE Right 1994    TOTAL SHOULDER REVERSE ARTHROPLASTY Right 06/05/2018    TUBAL ABDOMINAL LIGATION  2004       Family History   Problem Relation Age of Onset    Hypertension Mother     Heart disease Mother     COPD Mother     Hypertension Father     Diabetes Father     Stroke Father        Social History     Socioeconomic History    Marital status:    Tobacco Use    Smoking status: Never    Smokeless tobacco: Never   Vaping Use    Vaping status: Never Used   Substance and Sexual Activity    Alcohol use: Yes     Comment: wine occasionally    Drug use: No    Sexual activity: Defer     Prior to Admission medications    Medication Sig Start Date End Date Taking? Authorizing Provider   acetaminophen (TYLENOL) 650 MG 8 hr tablet Take 650 mg by mouth Every 8 (Eight) Hours As Needed for Mild Pain .    Emergency, Nurse Jc, RN   albuterol (PROVENTIL) (2.5 MG/3ML) 0.083% nebulizer solution Take 2.5 mg by nebulization Every 6 (Six) Hours As Needed for Wheezing or Shortness of Air. 7/18/17   Provider, MD Leti    albuterol sulfate HFA (ProAir HFA) 108 (90 Base) MCG/ACT inhaler Inhale 2 puffs 4 (Four) Times a Day. 1/29/22   Georgie Jacobsen APRN   ascorbic acid (VITAMIN C) 500 MG tablet Take 1 tablet by mouth Daily. 1/30/22   Georgie Jacobsen APRN   B Complex Vitamins (VITAMIN B COMPLEX) capsule capsule Take 1 capsule by mouth Daily. 4/20/17   Leti Fernando MD   benzonatate (Tessalon Perles) 100 MG capsule Take 100 mg by mouth 3 (Three) Times a Day As Needed for Cough.    Leti Fernando MD   Calcium Citrate-Vitamin D 200-125 MG-UNIT tablet Take 1 tablet by mouth Daily. 10/9/17   Leti Fernando MD   citalopram (CeleXA) 40 MG tablet Take 1 tablet by mouth Every Morning. 6/5/20   Jose Stern MD   famotidine (PEPCID) 40 MG tablet Take 40 mg by mouth Daily.    Leti Fernando MD   fexofenadine (ALLEGRA) 180 MG tablet Take 180 mg by mouth Daily.    Leti Fernando MD   fluticasone (FLONASE) 50 MCG/ACT nasal spray 2 sprays into the nostril(s) as directed by provider Daily. 4/18/19   Leti Fernando MD   fluticasone-salmeterol (ADVAIR) 500-50 MCG/DOSE DISKUS Inhale 1 puff 2 (Two) Times a Day. 4/30/21   Emergency, Nurse Jc, RN   guaiFENesin (MUCINEX) 600 MG 12 hr tablet Take 1,200 mg by mouth 2 (Two) Times a Day.    Leti Fernando MD   HYDROcodone-acetaminophen (Norco) 5-325 MG per tablet Take 1 tablet by mouth Every 6 (Six) Hours As Needed for Moderate Pain. 10/19/22   Aguilar Ferreira MD   ipratropium-albuterol (DUO-NEB) 0.5-2.5 mg/3 ml nebulizer Take 3 mL by nebulization Every 6 (Six) Hours While Awake. 9/15/22   Georgie Jacobsen APRN   lidocaine (LIDODERM) 5 % Place 1 patch on the skin as directed by provider Daily. Remove & Discard patch within 12 hours or as directed by MD 10/19/22   Aguilar Ferreira MD   losartan (Cozaar) 100 MG tablet Take 1 tablet by mouth Daily. 6/5/20   Jose Stern MD   montelukast (SINGULAIR) 10 MG tablet Take 10 mg by mouth  "Every Night.    Emergency, Nurse Epic, RN   Multiple Vitamins-Minerals (EYE VITAMINS & MINERALS) tablet Take 2 tablets by mouth Daily. 4/9/18   Provider, MD Leti   pantoprazole (PROTONIX) 40 MG EC tablet Take 40 mg by mouth Daily. 5/21/18   Provider, MD Leti   traMADol (ULTRAM) 50 MG tablet TAKE 2 TABLETS BY MOUTH TWICE DAILY AS NEEDED 5/25/22   Emergency, Nurse Jc RN   traMADol (ULTRAM) 50 MG tablet Take 1 tablet by mouth Every 6 (Six) Hours As Needed for Severe Pain. 9/24/22   Yonny Garzon MD   zinc sulfate (ZINCATE) 220 (50 Zn) MG capsule Take 1 capsule by mouth Daily. 1/30/22   Georgie Jacobsen, APRN     Prednisone, Ofev, rituxan        Objective   Physical Exam  63-year-old female awake alert.  Generally overweight.  Examination of the left leg reveals no significant effusion.  There is no warmth to left knee.  She complains of pain predominantly at the medial joint line.  She has increased pain with movement.  She is neurovascular intact distally.  Procedures           ED Course        XR Knee 3 View Left    Result Date: 5/19/2024  Impression: Medial compartment degenerative arthrosis without acute osseous injury noted. Electronically Signed: Mamie Arana MD  5/19/2024 1:00 PM EDT  Workstation ID: [id number]   Medications - No data to display  /76   Pulse 76   Temp 98.4 °F (36.9 °C) (Oral)   Resp 17   Ht 154.9 cm (61\")   Wt 109 kg (239 lb 6.7 oz)   SpO2 98%   BMI 45.24 kg/m²                                          Medical Decision Making  Amount and/or Complexity of Data Reviewed  Radiology: ordered.      My x-ray review and interpretation left knee reveals decreased joint space medial compartment.  There is mild osteophyte formation.  No evidence of acute bony abnormality noted.  Patient's findings were discussed with her.  With her current medication Tylenol would be the safest medication to continue with.  Advised to follow-up with her orthopedist, return new or worsening " symptoms  Final diagnoses:   Acute pain of left knee       ED Disposition  ED Disposition       ED Disposition   Discharge    Condition   Stable    Comment   --               Valerio George MD  [address] [facility] [place] [address] [phone]          Lobo Nayak MD  [address] [place] [address] [phone]               Medication List      No changes were made to your prescriptions during this visit.            Aguilar Ferreira MD  05/19/24 9024

## 2024-05-24 ENCOUNTER — OFFICE VISIT (OUTPATIENT)
Dept: ORTHOPEDIC SURGERY | Facility: CLINIC | Age: 64
End: 2024-05-24
Payer: MEDICARE

## 2024-05-24 VITALS — BODY MASS INDEX: 46.07 KG/M2 | OXYGEN SATURATION: 99 % | WEIGHT: 244 LBS | HEART RATE: 69 BPM | HEIGHT: 61 IN

## 2024-05-24 DIAGNOSIS — M17.12 PRIMARY OSTEOARTHRITIS OF LEFT KNEE: Primary | ICD-10-CM

## 2024-05-24 RX ORDER — TRIAMCINOLONE ACETONIDE 40 MG/ML
80 INJECTION, SUSPENSION INTRA-ARTICULAR; INTRAMUSCULAR
Status: COMPLETED | OUTPATIENT
Start: 2024-05-24 | End: 2024-05-24

## 2024-05-24 RX ORDER — NINTEDANIB 100 MG/1
CAPSULE ORAL
COMMUNITY

## 2024-05-24 RX ADMIN — TRIAMCINOLONE ACETONIDE 80 MG: 40 INJECTION, SUSPENSION INTRA-ARTICULAR; INTRAMUSCULAR at 08:22

## 2024-05-24 NOTE — PROGRESS NOTES
Subjective:     Patient ID: Mariel Parker is a 63 y.o. female.    Chief Complaint:    History of Present Illness  Mariel Parker presents to clinic today for evaluation of left predominantly medial sided knee pain.  She states that the pain began 2 weeks ago when she went to open a door and twisted her knee.  She felt a sharp aid.  She tried ice and Tylenol which did not alleviate her symptoms then about a few days ago she twisted her knee again to make the bed and had significant amount of pain.  She states she has RA and she has had knee issues and knee pain for quite some time but this is a little bit different.  She is hopeful to get some resolution of her symptoms and to avoid surgery.     Social History     Occupational History   • Not on file   Tobacco Use   • Smoking status: Never   • Smokeless tobacco: Never   Vaping Use   • Vaping status: Never Used   Substance and Sexual Activity   • Alcohol use: Yes     Comment: wine occasionally   • Drug use: No   • Sexual activity: Defer      Past Medical History:   Diagnosis Date   • GERD (gastroesophageal reflux disease)    • Hypertension    • Interstitial lung disease    • Intraocular pressure increase, bilateral    • Rheumatoid arthritis      Past Surgical History:   Procedure Laterality Date   • ACHILLES TENDON SURGERY  2009   • APPENDECTOMY  1999   • BRONCHOSCOPY N/A 1/29/2022    Procedure: BRONCHOSCOPY with lung washing;  Surgeon: Dana Garcia MD;  Location: [facility] ENDOSCOPY;  Service: Pulmonary;  Laterality: N/A;  post op: pneumonia   • CARPAL TUNNEL RELEASE Right 1994   • TOTAL SHOULDER REVERSE ARTHROPLASTY Right 06/05/2018   • TUBAL ABDOMINAL LIGATION  2004       Family History   Problem Relation Age of Onset   • Hypertension Mother    • Heart disease Mother    • COPD Mother    • Hypertension Father    • Diabetes Father    • Stroke Father                  Objective:  Vitals:    05/24/24 0754   Pulse: 69   SpO2: 99%   Weight: 111 kg (244 lb)   Height:  "154.9 cm (61\")         24  0754   Weight: 111 kg (244 lb)     Body mass index is 46.1 kg/m².        Left Knee Exam     Tenderness   The patient is experiencing tenderness in the medial retinaculum and medial joint line.    Range of Motion   Extension:  0   Flexion:  120     Tests   Seema:  Medial - positive   Varus: negative Valgus: negative  Lachman:  Anterior - negative    Posterior - negative  Drawer:  Anterior - negative     Posterior - negative    Other   Erythema: absent  Scars: absent  Sensation: normal  Pulse: present  Swelling: none  Effusion: no effusion present           Imagin views of the left knee were  reviewed by myself in the office today  Indication: left knee pain  Findings: X-rays demonstrate no acute osseous abnormality.  There is no signs of fracture dislocation or subluxation.  There is joint space narrowing, subchondral sclerosis, cystic changes, and periarticular osteophytes most pronounced in the Medial.  Comparative studies: None    Large Joint Arthrocentesis  Date/Time: 2024 8:22 AM  Consent given by: patient  Site marked: site marked  Timeout: Immediately prior to procedure a time out was called to verify the correct patient, procedure, equipment, support staff and site/side marked as required   Supporting Documentation  Indications: pain   Procedure Details  Location: knee -   Preparation: Patient was prepped and draped in the usual sterile fashion  Needle size: 22 G  Approach: anterolateral  Medications administered: 80 mg triamcinolone acetonide 40 MG/ML  Patient tolerance: patient tolerated the procedure well with no immediate complications      Assessment:        1. Primary osteoarthritis of left knee           Plan:          Discussed treatment options at length with patient at today's visit.  I discussed with the patient that she has developed left knee osteoarthritis. I discussed with the patient that the mainstays of conservative treatment for knee OA include " physical therapy, nonsteroidal anti-inflammatories, injections, activity modification, and home exercises.  The patient states that they  would like to try intra-articular corticosteroid injection.  I recommend she continue the prednisone taper prescribed to her by another provider.  Additionally I recommend that she purchase an over-the-counter knee sleeve to help provide some compression and stability at this point time the patient does not need to schedule follow-up with me today.  I am happy to see the patient back at any point time however if issues arise or they fail to make a full recovery.  Follow up: 6 weeks without x-rays      Mariel Parker was in agreement with plan and had all questions answered.     Medications:  No orders of the defined types were placed in this encounter.      Followup:  No follow-ups on file.    Diagnoses and all orders for this visit:    1. Primary osteoarthritis of left knee (Primary)          Dictated utilizing Dragon dictation

## 2024-08-15 ENCOUNTER — OFFICE VISIT (OUTPATIENT)
Dept: ORTHOPEDIC SURGERY | Facility: CLINIC | Age: 64
End: 2024-08-15
Payer: MEDICARE

## 2024-08-15 VITALS — WEIGHT: 244 LBS | HEIGHT: 61 IN | OXYGEN SATURATION: 95 % | BODY MASS INDEX: 46.07 KG/M2 | HEART RATE: 87 BPM

## 2024-08-15 DIAGNOSIS — M17.12 PRIMARY OSTEOARTHRITIS OF LEFT KNEE: Primary | ICD-10-CM

## 2024-08-15 NOTE — PROGRESS NOTES
"Subjective:     Patient ID: Mariel Parker is a 64 y.o. female.    Chief Complaint:    History of Present Illness  Mariel Parker returns to clinic today for evaluation of left knee pain and osteoarthritis.  The patient states that the injection helped tremendously.  She states only over the last week or so that she started to notice some increasing pain mostly anteriorly and medially in her knee.  She is hopeful to get another injection today and some resolution of her symptoms.  The patient is scheduled to get an RA injection next week.  She states that she tried some knee braces but her leg was \"too fat and they all slid down\".    Social History     Occupational History    Not on file   Tobacco Use    Smoking status: Never     Passive exposure: Past    Smokeless tobacco: Never   Vaping Use    Vaping status: Never Used   Substance and Sexual Activity    Alcohol use: Not Currently     Comment: wine occasionally    Drug use: No    Sexual activity: Yes     Partners: Male     Birth control/protection: Tubal ligation      Past Medical History:   Diagnosis Date    Ankle sprain     Arthritis of back     COPD (chronic obstructive pulmonary disease)     CTS (carpal tunnel syndrome)     GERD (gastroesophageal reflux disease)     Hip arthrosis     Hypertension     Interstitial lung disease     Intraocular pressure increase, bilateral     Knee swelling     Low back strain     Periarthritis of shoulder     Pulmonary fibrosis     Rheumatoid arthritis     Tennis elbow      Past Surgical History:   Procedure Laterality Date    ACHILLES TENDON SURGERY  2009    APPENDECTOMY  1999    BRONCHOSCOPY N/A 01/29/2022    Procedure: BRONCHOSCOPY with lung washing;  Surgeon: Dana Garcia MD;  Location: [facility] ENDOSCOPY;  Service: Pulmonary;  Laterality: N/A;  post op: pneumonia    CARPAL TUNNEL RELEASE Right 1994    FOOT SURGERY      HAND SURGERY      JOINT REPLACEMENT      SHOULDER SURGERY      TOTAL SHOULDER REVERSE ARTHROPLASTY Right " "06/05/2018    TRIGGER POINT INJECTION      TUBAL ABDOMINAL LIGATION  2004    WRIST SURGERY         Family History   Problem Relation Age of Onset    Hypertension Mother     Heart disease Mother     COPD Mother     Rheumatologic disease Mother     Hypertension Father     Diabetes Father     Stroke Father     Broken bones Sister     Cancer Sister                  Objective:  Vitals:    08/15/24 1335   Pulse: 87   SpO2: 95%   Weight: 111 kg (244 lb)   Height: 154.9 cm (61\")         08/15/24  1335   Weight: 111 kg (244 lb)     Body mass index is 46.1 kg/m².        Left Knee Exam     Tenderness   The patient is experiencing tenderness in the medial retinaculum and medial joint line.    Range of Motion   Extension:  0   Flexion:  120     Tests   Seema:  Medial - positive Lateral - negative  Varus: negative Valgus: negative  Lachman:  Anterior - negative    Posterior - negative  Drawer:  Anterior - negative     Posterior - negative    Other   Erythema: absent  Scars: absent  Sensation: normal  Pulse: present  Swelling: none  Effusion: no effusion present               Imaging: no new    Assessment:        1. Primary osteoarthritis of left knee           Plan:          Discussed treatment options at length with patient at today's visit.   She is scheduled for an RA infusion next week and would like to wait and see if the RA infusion helps with her knee pain.  Unfortunately she is a week early on being able to get another knee injection today.  I discussed with her that I would like her to continue with her activities as tolerated without restriction.  I discussed with her that if she gets ERA injection it does not help tremendously with her pain I would like to see her back and we can give her another left knee injection.  The patient was happy with that treatment plan.  Follow up: 2 weeks for L knee injection visit without x-rays      Mariel Parker was in agreement with plan and had all questions answered. "     Medications:  No orders of the defined types were placed in this encounter.      Followup:  No follow-ups on file.    Diagnoses and all orders for this visit:    1. Primary osteoarthritis of left knee (Primary)          Dictated utilizing Dragon dictation

## 2024-08-19 ENCOUNTER — LAB (OUTPATIENT)
Dept: LAB | Facility: HOSPITAL | Age: 64
End: 2024-08-19
Payer: MEDICARE

## 2024-08-19 LAB
APTT PPP: 30.2 SECONDS (ref 24–31)
BASOPHILS # BLD AUTO: 0.05 10*3/MM3 (ref 0–0.2)
BASOPHILS NFR BLD AUTO: 0.8 % (ref 0–1.5)
DEPRECATED RDW RBC AUTO: 44 FL (ref 37–54)
EOSINOPHIL # BLD AUTO: 0.47 10*3/MM3 (ref 0–0.4)
EOSINOPHIL NFR BLD AUTO: 7.1 % (ref 0.3–6.2)
ERYTHROCYTE [DISTWIDTH] IN BLOOD BY AUTOMATED COUNT: 13.4 % (ref 12.3–15.4)
HCT VFR BLD AUTO: 36.8 % (ref 34–46.6)
HGB BLD-MCNC: 11.8 G/DL (ref 12–15.9)
IMM GRANULOCYTES # BLD AUTO: 0.03 10*3/MM3 (ref 0–0.05)
IMM GRANULOCYTES NFR BLD AUTO: 0.5 % (ref 0–0.5)
INR PPP: 0.98 (ref 0.93–1.1)
LYMPHOCYTES # BLD AUTO: 1.35 10*3/MM3 (ref 0.7–3.1)
LYMPHOCYTES NFR BLD AUTO: 20.4 % (ref 19.6–45.3)
MCH RBC QN AUTO: 28.8 PG (ref 26.6–33)
MCHC RBC AUTO-ENTMCNC: 32.1 G/DL (ref 31.5–35.7)
MCV RBC AUTO: 89.8 FL (ref 79–97)
MONOCYTES # BLD AUTO: 1.07 10*3/MM3 (ref 0.1–0.9)
MONOCYTES NFR BLD AUTO: 16.2 % (ref 5–12)
NEUTROPHILS NFR BLD AUTO: 3.65 10*3/MM3 (ref 1.7–7)
NEUTROPHILS NFR BLD AUTO: 55 % (ref 42.7–76)
NRBC BLD AUTO-RTO: 0 /100 WBC (ref 0–0.2)
PLATELET # BLD AUTO: 266 10*3/MM3 (ref 140–450)
PMV BLD AUTO: 11 FL (ref 6–12)
PROTHROMBIN TIME: 10.7 SECONDS (ref 9.6–11.7)
RBC # BLD AUTO: 4.1 10*6/MM3 (ref 3.77–5.28)
WBC NRBC COR # BLD AUTO: 6.62 10*3/MM3 (ref 3.4–10.8)

## 2024-08-19 PROCEDURE — 85025 COMPLETE CBC W/AUTO DIFF WBC: CPT | Performed by: INTERNAL MEDICINE

## 2024-08-19 PROCEDURE — 36415 COLL VENOUS BLD VENIPUNCTURE: CPT | Performed by: INTERNAL MEDICINE

## 2024-08-19 PROCEDURE — 85610 PROTHROMBIN TIME: CPT | Performed by: INTERNAL MEDICINE

## 2024-08-19 PROCEDURE — 85730 THROMBOPLASTIN TIME PARTIAL: CPT | Performed by: INTERNAL MEDICINE

## 2024-08-23 ENCOUNTER — ANESTHESIA (OUTPATIENT)
Dept: GASTROENTEROLOGY | Facility: HOSPITAL | Age: 64
End: 2024-08-23
Payer: MEDICARE

## 2024-08-23 ENCOUNTER — HOSPITAL ENCOUNTER (OUTPATIENT)
Facility: HOSPITAL | Age: 64
Setting detail: HOSPITAL OUTPATIENT SURGERY
Discharge: HOME | End: 2024-08-23
Attending: INTERNAL MEDICINE | Admitting: INTERNAL MEDICINE
Payer: MEDICARE

## 2024-08-23 ENCOUNTER — ANESTHESIA EVENT (OUTPATIENT)
Dept: GASTROENTEROLOGY | Facility: HOSPITAL | Age: 64
End: 2024-08-23
Payer: MEDICARE

## 2024-08-23 VITALS
RESPIRATION RATE: 18 BRPM | DIASTOLIC BLOOD PRESSURE: 74 MMHG | SYSTOLIC BLOOD PRESSURE: 145 MMHG | OXYGEN SATURATION: 99 % | HEART RATE: 93 BPM

## 2024-08-23 DIAGNOSIS — J32.9 CHRONIC SINUSITIS: ICD-10-CM

## 2024-08-23 DIAGNOSIS — R05.9 COUGH: ICD-10-CM

## 2024-08-23 LAB
B PARAPERT DNA SPEC QL NAA+PROBE: NOT DETECTED
B PERT DNA SPEC QL NAA+PROBE: NOT DETECTED
C PNEUM DNA NPH QL NAA+NON-PROBE: NOT DETECTED
FLUAV SUBTYP SPEC NAA+PROBE: NOT DETECTED
FLUBV RNA ISLT QL NAA+PROBE: NOT DETECTED
HADV DNA SPEC NAA+PROBE: NOT DETECTED
HCOV 229E RNA SPEC QL NAA+PROBE: NOT DETECTED
HCOV HKU1 RNA SPEC QL NAA+PROBE: NOT DETECTED
HCOV NL63 RNA SPEC QL NAA+PROBE: NOT DETECTED
HCOV OC43 RNA SPEC QL NAA+PROBE: NOT DETECTED
HMPV RNA NPH QL NAA+NON-PROBE: NOT DETECTED
HPIV1 RNA ISLT QL NAA+PROBE: NOT DETECTED
HPIV2 RNA SPEC QL NAA+PROBE: NOT DETECTED
HPIV3 RNA NPH QL NAA+PROBE: NOT DETECTED
HPIV4 P GENE NPH QL NAA+PROBE: NOT DETECTED
M PNEUMO IGG SER IA-ACNC: NOT DETECTED
RHINOVIRUS RNA SPEC NAA+PROBE: DETECTED
RSV RNA NPH QL NAA+NON-PROBE: NOT DETECTED
SARS-COV-2 RNA NPH QL NAA+NON-PROBE: NOT DETECTED

## 2024-08-23 PROCEDURE — 87102 FUNGUS ISOLATION CULTURE: CPT | Performed by: INTERNAL MEDICINE

## 2024-08-23 PROCEDURE — 25810000003 SODIUM CHLORIDE 0.9 % SOLUTION: Performed by: NURSE ANESTHETIST, CERTIFIED REGISTERED

## 2024-08-23 PROCEDURE — 25010000002 PROPOFOL 200 MG/20ML EMULSION: Performed by: NURSE ANESTHETIST, CERTIFIED REGISTERED

## 2024-08-23 PROCEDURE — 87070 CULTURE OTHR SPECIMN AEROBIC: CPT | Performed by: INTERNAL MEDICINE

## 2024-08-23 PROCEDURE — 87116 MYCOBACTERIA CULTURE: CPT | Performed by: INTERNAL MEDICINE

## 2024-08-23 PROCEDURE — 88108 CYTOPATH CONCENTRATE TECH: CPT | Performed by: INTERNAL MEDICINE

## 2024-08-23 PROCEDURE — 87205 SMEAR GRAM STAIN: CPT | Performed by: INTERNAL MEDICINE

## 2024-08-23 PROCEDURE — 87206 SMEAR FLUORESCENT/ACID STAI: CPT | Performed by: INTERNAL MEDICINE

## 2024-08-23 PROCEDURE — 0202U NFCT DS 22 TRGT SARS-COV-2: CPT | Performed by: INTERNAL MEDICINE

## 2024-08-23 PROCEDURE — 87798 DETECT AGENT NOS DNA AMP: CPT | Performed by: INTERNAL MEDICINE

## 2024-08-23 RX ORDER — PROPOFOL 10 MG/ML
INJECTION, EMULSION INTRAVENOUS AS NEEDED
Status: DISCONTINUED | OUTPATIENT
Start: 2024-08-23 | End: 2024-08-23 | Stop reason: SURG

## 2024-08-23 RX ORDER — SODIUM CHLORIDE 9 MG/ML
INJECTION, SOLUTION INTRAVENOUS CONTINUOUS PRN
Status: DISCONTINUED | OUTPATIENT
Start: 2024-08-23 | End: 2024-08-23 | Stop reason: SURG

## 2024-08-23 RX ORDER — LIDOCAINE HYDROCHLORIDE 20 MG/ML
INJECTION, SOLUTION EPIDURAL; INFILTRATION; INTRACAUDAL; PERINEURAL AS NEEDED
Status: DISCONTINUED | OUTPATIENT
Start: 2024-08-23 | End: 2024-08-23 | Stop reason: SURG

## 2024-08-23 RX ORDER — LIDOCAINE 50 MG/G
OINTMENT TOPICAL AS NEEDED
Status: DISCONTINUED | OUTPATIENT
Start: 2024-08-23 | End: 2024-08-23 | Stop reason: HOSPADM

## 2024-08-23 RX ORDER — LIDOCAINE HYDROCHLORIDE 20 MG/ML
INJECTION, SOLUTION INFILTRATION; PERINEURAL AS NEEDED
Status: DISCONTINUED | OUTPATIENT
Start: 2024-08-23 | End: 2024-08-23 | Stop reason: HOSPADM

## 2024-08-23 RX ADMIN — PROPOFOL 50 MG: 10 INJECTION, EMULSION INTRAVENOUS at 12:28

## 2024-08-23 RX ADMIN — LIDOCAINE HYDROCHLORIDE 50 MG: 20 INJECTION, SOLUTION EPIDURAL; INFILTRATION; INTRACAUDAL; PERINEURAL at 12:28

## 2024-08-23 RX ADMIN — SODIUM CHLORIDE: 9 INJECTION, SOLUTION INTRAVENOUS at 12:24

## 2024-08-23 NOTE — ANESTHESIA POSTPROCEDURE EVALUATION
Patient: Mariel Parker    Procedure Summary       Date: 08/23/24 Room / Location:  [facility]    Anesthesia Start: 1224 Anesthesia Stop: 1234    Procedure: BRONCHOSCOPY WITH BILATERAL LUNG WASHING (Bronchus) Diagnosis:       Cough      Chronic sinusitis      (R/O RESISTANT INFECTION D/T CHRONIC SINUSITIS)    Surgeons: Dana Garcia MD Provider:     Anesthesia Type: general, MAC ASA Status: 3            Anesthesia Type: general, MAC    Vitals  Vitals Value Taken Time   /59 08/23/24 1259   Temp     Pulse 90 08/23/24 1301   Resp 18 08/23/24 1254   SpO2 96 % 08/23/24 1301   Vitals shown include unfiled device data.        Post Anesthesia Care and Evaluation    Patient location during evaluation: PACU  Patient participation: complete - patient participated  Level of consciousness: awake  Pain scale: See nurse's notes for pain score.  Pain management: adequate    Airway patency: patent  Anesthetic complications: No anesthetic complications  PONV Status: none  Cardiovascular status: acceptable  Respiratory status: acceptable and spontaneous ventilation  Hydration status: acceptable    Comments: Patient seen and examined postoperatively; vital signs stable; SpO2 greater than or equal to 90%; cardiopulmonary status stable; nausea/vomiting adequately controlled; pain adequately controlled; no apparent anesthesia complications; patient discharged from anesthesia care when discharge criteria were met

## 2024-08-23 NOTE — OP NOTE
Bronchoscopy Procedure Note    Timeout was done appropriately by staff    Procedure:  Bronchoscopy, Diagnostic  Bilateral lung wash     Preoperative Diagnosis:  Cough and worsening shortness of breath     Postoperative Diagnosis:     Thick mucopurulent secretions suctioned therapeutically   Bilateral lung wash    Anesthesia: Moderate Sedation    Procedure Details: Patient was consented for the procedure with all risks and benefits of the procedure explained in detail.  Patient was given the opportunity to ask questions and all concerns were answered.  The bronchocope was inserted into the main airway via the oropharynx. An anatomical survey was done of the main airways and the subsegmental bronchus to at least the first subsegmental level of all five lobes of both lungs.  The findings are reported below.  A bronchoalveolar lavage was performed using aliquots of normal saline instilled into the airways then aspirated back.      Findings:       Thick mucopurulent secretions suctioned therapeutically   Bilateral lung wash    Patient tolerated procedure well        Estimated Blood Loss:  Minimal           Specimens:  Sent purulent fluid                Complications:  None; patient tolerated the procedure well.           Disposition: PACU - hemodynamically stable.      Patient tolerated the procedure well.    Dana Garcia MD  8/23/2024  16:10 EDT

## 2024-08-23 NOTE — H&P
Patient Care Team:  Valerio George MD as PCP - General (Family Medicine)    Chief complaint   Cough and worsening shortness of breath      Assessment & Plan     64-year-old female chronic cough s/p covid-19 July 4th, on Rituxan  rheumatoid arthritis, per rheumatology.    Interstitial lung disease no significant change on CT scan since 2020,   Cough productive of yellow-green sputum, CT sinus  left sphenoid  sinusitis    PFT 03/28/2024, FEV1 1.5 L/ 68%, ratio 92, TLC 69%, RV 71%, DLCO 81%  PFT June 2019 FEV1 54% TLC 61% DLCO 46%    PFT August 2016  FEV1 85% to 90% TLC 67% RV 15% DLCO 78%   PFT June 2017 showed FEV1 54-55% declined 12 months from 90%  TLC 61% predicted, DLCO 46% was 78% last year    Chest CT . Scattered areas of interstitial fibrosis in both lungs, greatest in upper lobe distribution, Repeat CT chest 06/13/2023 stable lung fibrosis   progressive alveolar interstitial lung disease suggestive NSIP, 1/2023 completed slow 3 month taper of prednisone     ECHO- EF 60%, rvsp 25.8  Residual AHI 1.0, compliance 100% >4 hrs, no leak, no snoring  Hosp f/u COVID 1-2022, completed remdesivir, steroid.  Bronch with no growth.   Cont with brain fog post covid   BETTY, AHI 21.9   history of recurrent bronchitis    CT scan October 2018 stable appearance of mild interstitial lung disease possible RA  bronchoscopy 08/03/2018 bacteria negative AFP negative fungal few penicillium species  bronchoscopy 03/02/2018 showed Pseudomonas sensitive to all antibiotics,    10 days of Cipro p.o.  tobramycin nebulizer 300 mg b.i.d. for 10 days   reactive airway disease and inflammation noted during bronchoscopy   AFB is negative after 43 days,  fungal is negative,  PCPs negative  Interstitial lung disease CT scan of the chest 02/18/2018 showed ground-glass alveolar lung disease with micronodular rule out infection  Patient had cough-vomit syndrome,  received 10 days of azithromycin  Bronchoscopy 08/16/2017, grew streptococcal  See procedure note for details. CBD stones removed. ?celiac. Bx taken. Clears, rectal indomethacin, TTG. ?home tomorrow   pneumonia  AFB and fungal is negative Cytomegalovirus and PCP PCR are negative    History of RA on Humira, Plaquenil, off methotrexate  on prednisone 20 mg daily,    Chest x-ray May 2017 showed increased marking in the right upper lobe     ct chest 8/2016, mildly prominent interstitial markings in the periphery of the upper and lower lobes, and right middle lobe which are chronic. centrilobular and ground-glass   Hypertension most likely related to underlying obstructive sleep apnea and diagnosed untreated      Plan  Diagnostic bronchoscopy to rule out opportunistic infection    History    64-year-old female chronic cough s/p covid-19 July 4th, on Rituxan  rheumatoid arthritis, per rheumatology.    Interstitial lung disease no significant change on CT scan since 2020,   Cough productive of yellow-green sputum, CT sinus  left sphenoid  sinusitis    PFT 03/28/2024, FEV1 1.5 L/ 68%, ratio 92, TLC 69%, RV 71%, DLCO 81%  PFT June 2019 FEV1 54% TLC 61% DLCO 46%    PFT August 2016  FEV1 85% to 90% TLC 67% RV 15% DLCO 78%   PFT June 2017 showed FEV1 54-55% declined 12 months from 90%  TLC 61% predicted, DLCO 46% was 78% last year    Chest CT . Scattered areas of interstitial fibrosis in both lungs, greatest in upper lobe distribution, Repeat CT chest 06/13/2023 stable lung fibrosis   progressive alveolar interstitial lung disease suggestive NSIP, 1/2023 completed slow 3 month taper of prednisone     ECHO- EF 60%, rvsp 25.8  Residual AHI 1.0, compliance 100% >4 hrs, no leak, no snoring  Hosp f/u COVID 1-2022, completed remdesivir, steroid.  Bronch with no growth.   Cont with brain fog post covid   BETTY, AHI 21.9   history of recurrent bronchitis    CT scan October 2018 stable appearance of mild interstitial lung disease possible RA  bronchoscopy 08/03/2018 bacteria negative AFP negative fungal few penicillium species  bronchoscopy 03/02/2018 showed Pseudomonas sensitive to all antibiotics,    10 days of Cipro p.o.  tobramycin  nebulizer 300 mg b.i.d. for 10 days   reactive airway disease and inflammation noted during bronchoscopy   AFB is negative after 43 days,  fungal is negative,  PCPs negative  Interstitial lung disease CT scan of the chest 02/18/2018 showed ground-glass alveolar lung disease with micronodular rule out infection  Patient had cough-vomit syndrome,  received 10 days of azithromycin  Bronchoscopy 08/16/2017, grew streptococcal pneumonia  AFB and fungal is negative Cytomegalovirus and PCP PCR are negative    History of RA on Humira, Plaquenil, off methotrexate  on prednisone 20 mg daily,    Chest x-ray May 2017 showed increased marking in the right upper lobe     ct chest 8/2016, mildly prominent interstitial markings in the periphery of the upper and lower lobes, and right middle lobe which are chronic. centrilobular and ground-glass   Hypertension most likely related to underlying obstructive sleep apnea and diagnosed untreated      * No active hospital problems. *      Past Medical History:   Diagnosis Date    Ankle sprain     Arthritis of back     Cancer     COPD (chronic obstructive pulmonary disease)     CTS (carpal tunnel syndrome)     GERD (gastroesophageal reflux disease)     Hip arthrosis     Hypertension     Interstitial lung disease     Intraocular pressure increase, bilateral     Knee swelling     Low back strain     Periarthritis of shoulder     Pulmonary fibrosis     Rheumatoid arthritis     Sleep apnea     Tennis elbow        Past Surgical History:   Procedure Laterality Date    ACHILLES TENDON SURGERY  2009    APPENDECTOMY  1999    BRONCHOSCOPY N/A 01/29/2022    Procedure: BRONCHOSCOPY with lung washing;  Surgeon: Dana Garcia MD;  Location: [facility] ENDOSCOPY;  Service: Pulmonary;  Laterality: N/A;  post op: pneumonia    CARPAL TUNNEL RELEASE Right 1994    FOOT SURGERY      HAND SURGERY      JOINT REPLACEMENT      SHOULDER SURGERY      TOTAL SHOULDER REVERSE ARTHROPLASTY Right 06/05/2018    TRIGGER POINT  "INJECTION      TUBAL ABDOMINAL LIGATION  2004    WRIST SURGERY         Family History   Problem Relation Age of Onset    Hypertension Mother     Heart disease Mother     COPD Mother     Rheumatologic disease Mother     Hypertension Father     Diabetes Father     Stroke Father     Broken bones Sister     Cancer Sister        Social History     Socioeconomic History    Marital status:    Tobacco Use    Smoking status: Never     Passive exposure: Past    Smokeless tobacco: Never   Vaping Use    Vaping status: Never Used   Substance and Sexual Activity    Alcohol use: Not Currently     Comment: wine occasionally    Drug use: No    Sexual activity: Yes     Partners: Male     Birth control/protection: Tubal ligation       Review of Systems  Review of Systems   Respiratory:  Positive for cough and shortness of breath. Negative for wheezing and stridor.    Cardiovascular:  Negative for chest pain, palpitations and leg swelling.        Vital Signs       Physical Exam:  Physical Exam  Cardiovascular:      Heart sounds: Murmur heard.      No gallop.   Pulmonary:      Effort: No respiratory distress.      Breath sounds: Rales present. No wheezing.           Radiology  Imaging Results (Last 24 Hours)       ** No results found for the last 24 hours. **            Labs:  Results from last 7 days   Lab Units 08/19/24  1015   WBC 10*3/mm3 6.62   HEMOGLOBIN g/dL 11.8*   HEMATOCRIT % 36.8   PLATELETS 10*3/mm3 266           Invalid input(s): \"LABALBU\", \"PROT\"                      Results from last 7 days   Lab Units 08/19/24  1015   INR  0.98   APTT seconds 30.2               Meds:   SCHEDULE    Infusions  No current facility-administered medications for this encounter.    PRNs        I discussed the patient's findings and my recommendations with patient.     Dana Garcia MD  08/23/24  08:14 EDT    Time: 60 minutes  " 3

## 2024-08-23 NOTE — DISCHARGE INSTRUCTIONS
Do not drink alcohol, drive, operate any heavy machinery or power tools, or make any important/legal decisions for the next 24 hours.    Call you doctor immediately if you experience severe chest pain, shortness of breath, bleeding or coughing up blood, or fever over 101 F.    Diet: Nothing by mouth until: 2:40 pm    After a bronchoscopy, you may experience a scratchy throat. This will gradually get better. You may gargle with warm salt water for this after the time noted above is over.     A responsible adult should stay with you and you should rest quietly for the rest of the day. Follow up with MD as instructed.

## 2024-08-23 NOTE — ANESTHESIA PREPROCEDURE EVALUATION
Anesthesia Evaluation     Patient summary reviewed and Nursing notes reviewed   NPO Solid Status: > 8 hours  NPO Liquid Status: > 8 hours           Airway   Mallampati: II  TM distance: >3 FB  Neck ROM: full  No difficulty expected  Dental          Pulmonary    (+) pneumonia , COPD, asthma,shortness of breath, sleep apnea  Cardiovascular     (+) hypertension, hyperlipidemia      Neuro/Psych  (+) numbness, psychiatric history  GI/Hepatic/Renal/Endo    (+) morbid obesity, GERD    Musculoskeletal     Abdominal    Substance History      OB/GYN          Other   arthritis,   history of cancer    ROS/Med Hx Other: Left atrial enlargement.  Normal pulmonary artery pressures (22 mmHg)  Structurally and functionally normal cardiac valves.  Left ventricular size and contractility is normal with ejection fraction of 60%.                Anesthesia Plan    ASA 3     general and MAC     intravenous induction     Anesthetic plan, risks, benefits, and alternatives have been provided, discussed and informed consent has been obtained with: patient.    Plan discussed with CRNA.    CODE STATUS:

## 2024-08-24 LAB — NIGHT BLUE STAIN TISS: NORMAL

## 2024-08-25 LAB
BACTERIA SPEC RESP CULT: NORMAL
GRAM STN SPEC: NORMAL

## 2024-08-26 LAB
LAB AP CASE REPORT: NORMAL
P JIROVECII DNA L RESP QL NAA+NON-PROBE: NEGATIVE
PATH REPORT.FINAL DX SPEC: NORMAL
PATH REPORT.GROSS SPEC: NORMAL
REF LAB TEST METHOD: NORMAL

## 2024-08-30 LAB
FUNGUS WND CULT: NORMAL
MYCOBACTERIUM SPEC CULT: NORMAL
NIGHT BLUE STAIN TISS: NORMAL

## 2024-09-06 LAB
FUNGUS WND CULT: NORMAL
MYCOBACTERIUM SPEC CULT: NORMAL
NIGHT BLUE STAIN TISS: NORMAL

## 2024-09-13 LAB
FUNGUS WND CULT: NORMAL
MYCOBACTERIUM SPEC CULT: NORMAL
NIGHT BLUE STAIN TISS: NORMAL

## 2024-09-19 ENCOUNTER — OFFICE VISIT (OUTPATIENT)
Dept: ORTHOPEDIC SURGERY | Facility: CLINIC | Age: 64
End: 2024-09-19
Payer: MEDICARE

## 2024-09-19 VITALS — OXYGEN SATURATION: 99 % | BODY MASS INDEX: 46.07 KG/M2 | HEART RATE: 82 BPM | WEIGHT: 244 LBS | HEIGHT: 61 IN

## 2024-09-19 DIAGNOSIS — M17.12 PRIMARY OSTEOARTHRITIS OF LEFT KNEE: Primary | ICD-10-CM

## 2024-09-19 RX ORDER — TRIAMCINOLONE ACETONIDE 40 MG/ML
80 INJECTION, SUSPENSION INTRA-ARTICULAR; INTRAMUSCULAR
Status: COMPLETED | OUTPATIENT
Start: 2024-09-19 | End: 2024-09-19

## 2024-09-19 RX ORDER — LIDOCAINE HYDROCHLORIDE 10 MG/ML
4 INJECTION, SOLUTION EPIDURAL; INFILTRATION; INTRACAUDAL; PERINEURAL
Status: COMPLETED | OUTPATIENT
Start: 2024-09-19 | End: 2024-09-19

## 2024-09-19 RX ADMIN — TRIAMCINOLONE ACETONIDE 80 MG: 40 INJECTION, SUSPENSION INTRA-ARTICULAR; INTRAMUSCULAR at 09:24

## 2024-09-19 RX ADMIN — LIDOCAINE HYDROCHLORIDE 4 ML: 10 INJECTION, SOLUTION EPIDURAL; INFILTRATION; INTRACAUDAL; PERINEURAL at 09:24

## 2024-09-20 LAB
FUNGUS WND CULT: NORMAL
MYCOBACTERIUM SPEC CULT: NORMAL
NIGHT BLUE STAIN TISS: NORMAL

## 2024-09-25 ENCOUNTER — ANESTHESIA EVENT (OUTPATIENT)
Dept: GASTROENTEROLOGY | Facility: HOSPITAL | Age: 64
End: 2024-09-25
Payer: MEDICARE

## 2024-09-25 ENCOUNTER — HOSPITAL ENCOUNTER (INPATIENT)
Facility: HOSPITAL | Age: 64
LOS: 1 days | Discharge: HOME | End: 2024-09-27
Attending: EMERGENCY MEDICINE | Admitting: EMERGENCY MEDICINE
Payer: MEDICARE

## 2024-09-25 ENCOUNTER — APPOINTMENT (OUTPATIENT)
Dept: CT IMAGING | Facility: HOSPITAL | Age: 64
End: 2024-09-25
Payer: MEDICARE

## 2024-09-25 ENCOUNTER — APPOINTMENT (OUTPATIENT)
Dept: GENERAL RADIOLOGY | Facility: HOSPITAL | Age: 64
End: 2024-09-25
Payer: MEDICARE

## 2024-09-25 DIAGNOSIS — R04.2 HEMOPTYSIS: Primary | ICD-10-CM

## 2024-09-25 LAB
ALBUMIN SERPL-MCNC: 4.2 G/DL (ref 3.5–5.2)
ALBUMIN/GLOB SERPL: 1.4 G/DL
ALP SERPL-CCNC: 90 U/L (ref 39–117)
ALT SERPL W P-5'-P-CCNC: 12 U/L (ref 1–33)
ANION GAP SERPL CALCULATED.3IONS-SCNC: 10.7 MMOL/L (ref 5–15)
APTT PPP: 30.7 SECONDS (ref 61–76.5)
AST SERPL-CCNC: 18 U/L (ref 1–32)
BASOPHILS # BLD AUTO: 0.05 10*3/MM3 (ref 0–0.2)
BASOPHILS NFR BLD AUTO: 0.4 % (ref 0–1.5)
BILIRUB SERPL-MCNC: 0.3 MG/DL (ref 0–1.2)
BUN SERPL-MCNC: 14 MG/DL (ref 8–23)
BUN/CREAT SERPL: 19.4 (ref 7–25)
CALCIUM SPEC-SCNC: 9.6 MG/DL (ref 8.6–10.5)
CHLORIDE SERPL-SCNC: 101 MMOL/L (ref 98–107)
CO2 SERPL-SCNC: 28.3 MMOL/L (ref 22–29)
CREAT SERPL-MCNC: 0.72 MG/DL (ref 0.57–1)
DEPRECATED RDW RBC AUTO: 53.1 FL (ref 37–54)
EGFRCR SERPLBLD CKD-EPI 2021: 93.5 ML/MIN/1.73
EOSINOPHIL # BLD AUTO: 0.37 10*3/MM3 (ref 0–0.4)
EOSINOPHIL NFR BLD AUTO: 2.7 % (ref 0.3–6.2)
ERYTHROCYTE [DISTWIDTH] IN BLOOD BY AUTOMATED COUNT: 15.5 % (ref 12.3–15.4)
GLOBULIN UR ELPH-MCNC: 3.1 GM/DL
GLUCOSE SERPL-MCNC: 88 MG/DL (ref 65–99)
HCT VFR BLD AUTO: 39.2 % (ref 34–46.6)
HGB BLD-MCNC: 12.1 G/DL (ref 12–15.9)
HOLD SPECIMEN: NORMAL
IMM GRANULOCYTES # BLD AUTO: 0.06 10*3/MM3 (ref 0–0.05)
IMM GRANULOCYTES NFR BLD AUTO: 0.4 % (ref 0–0.5)
INR PPP: 0.94 (ref 0.93–1.1)
LYMPHOCYTES # BLD AUTO: 1.57 10*3/MM3 (ref 0.7–3.1)
LYMPHOCYTES NFR BLD AUTO: 11.5 % (ref 19.6–45.3)
MCH RBC QN AUTO: 28.7 PG (ref 26.6–33)
MCHC RBC AUTO-ENTMCNC: 30.9 G/DL (ref 31.5–35.7)
MCV RBC AUTO: 93.1 FL (ref 79–97)
MONOCYTES # BLD AUTO: 1.59 10*3/MM3 (ref 0.1–0.9)
MONOCYTES NFR BLD AUTO: 11.7 % (ref 5–12)
NEUTROPHILS NFR BLD AUTO: 10 10*3/MM3 (ref 1.7–7)
NEUTROPHILS NFR BLD AUTO: 73.3 % (ref 42.7–76)
NRBC BLD AUTO-RTO: 0 /100 WBC (ref 0–0.2)
NT-PROBNP SERPL-MCNC: 207 PG/ML (ref 0–900)
PLATELET # BLD AUTO: 310 10*3/MM3 (ref 140–450)
PMV BLD AUTO: 10.8 FL (ref 6–12)
POTASSIUM SERPL-SCNC: 4.3 MMOL/L (ref 3.5–5.2)
PROT SERPL-MCNC: 7.3 G/DL (ref 6–8.5)
PROTHROMBIN TIME: 10.3 SECONDS (ref 9.6–11.7)
RBC # BLD AUTO: 4.21 10*6/MM3 (ref 3.77–5.28)
SODIUM SERPL-SCNC: 140 MMOL/L (ref 136–145)
WBC NRBC COR # BLD AUTO: 13.64 10*3/MM3 (ref 3.4–10.8)

## 2024-09-25 PROCEDURE — 25010000002 ENOXAPARIN PER 10 MG: Performed by: PHYSICIAN ASSISTANT

## 2024-09-25 PROCEDURE — G0378 HOSPITAL OBSERVATION PER HR: HCPCS

## 2024-09-25 PROCEDURE — 85730 THROMBOPLASTIN TIME PARTIAL: CPT | Performed by: EMERGENCY MEDICINE

## 2024-09-25 PROCEDURE — 83880 ASSAY OF NATRIURETIC PEPTIDE: CPT | Performed by: EMERGENCY MEDICINE

## 2024-09-25 PROCEDURE — 71046 X-RAY EXAM CHEST 2 VIEWS: CPT

## 2024-09-25 PROCEDURE — 71275 CT ANGIOGRAPHY CHEST: CPT

## 2024-09-25 PROCEDURE — 85610 PROTHROMBIN TIME: CPT | Performed by: EMERGENCY MEDICINE

## 2024-09-25 PROCEDURE — 87070 CULTURE OTHR SPECIMN AEROBIC: CPT | Performed by: NURSE PRACTITIONER

## 2024-09-25 PROCEDURE — 85025 COMPLETE CBC W/AUTO DIFF WBC: CPT | Performed by: EMERGENCY MEDICINE

## 2024-09-25 PROCEDURE — 94799 UNLISTED PULMONARY SVC/PX: CPT

## 2024-09-25 PROCEDURE — 99285 EMERGENCY DEPT VISIT HI MDM: CPT

## 2024-09-25 PROCEDURE — 87205 SMEAR GRAM STAIN: CPT | Performed by: NURSE PRACTITIONER

## 2024-09-25 PROCEDURE — 25510000001 IOPAMIDOL PER 1 ML: Performed by: EMERGENCY MEDICINE

## 2024-09-25 PROCEDURE — 87077 CULTURE AEROBIC IDENTIFY: CPT | Performed by: NURSE PRACTITIONER

## 2024-09-25 PROCEDURE — 80053 COMPREHEN METABOLIC PANEL: CPT | Performed by: EMERGENCY MEDICINE

## 2024-09-25 PROCEDURE — 94640 AIRWAY INHALATION TREATMENT: CPT

## 2024-09-25 PROCEDURE — 36415 COLL VENOUS BLD VENIPUNCTURE: CPT

## 2024-09-25 RX ORDER — AMOXICILLIN 250 MG
2 CAPSULE ORAL 2 TIMES DAILY PRN
Status: DISCONTINUED | OUTPATIENT
Start: 2024-09-25 | End: 2024-09-27 | Stop reason: HOSPADM

## 2024-09-25 RX ORDER — ENOXAPARIN SODIUM 100 MG/ML
40 INJECTION SUBCUTANEOUS EVERY 12 HOURS
Status: DISCONTINUED | OUTPATIENT
Start: 2024-09-25 | End: 2024-09-27 | Stop reason: HOSPADM

## 2024-09-25 RX ORDER — SODIUM CHLORIDE 0.9 % (FLUSH) 0.9 %
10 SYRINGE (ML) INJECTION AS NEEDED
Status: DISCONTINUED | OUTPATIENT
Start: 2024-09-25 | End: 2024-09-27 | Stop reason: HOSPADM

## 2024-09-25 RX ORDER — BISACODYL 5 MG/1
5 TABLET, DELAYED RELEASE ORAL DAILY PRN
Status: DISCONTINUED | OUTPATIENT
Start: 2024-09-25 | End: 2024-09-27 | Stop reason: HOSPADM

## 2024-09-25 RX ORDER — FLUTICASONE PROPIONATE 50 UG/1
1 SPRAY, METERED NASAL 2 TIMES DAILY
COMMUNITY

## 2024-09-25 RX ORDER — NITROGLYCERIN 0.4 MG/1
0.4 TABLET SUBLINGUAL
Status: DISCONTINUED | OUTPATIENT
Start: 2024-09-25 | End: 2024-09-27 | Stop reason: HOSPADM

## 2024-09-25 RX ORDER — IOPAMIDOL 755 MG/ML
100 INJECTION, SOLUTION INTRAVASCULAR
Status: COMPLETED | OUTPATIENT
Start: 2024-09-25 | End: 2024-09-25

## 2024-09-25 RX ORDER — SODIUM CHLORIDE 0.9 % (FLUSH) 0.9 %
10 SYRINGE (ML) INJECTION EVERY 12 HOURS SCHEDULED
Status: DISCONTINUED | OUTPATIENT
Start: 2024-09-25 | End: 2024-09-27 | Stop reason: HOSPADM

## 2024-09-25 RX ORDER — SODIUM CHLORIDE 9 MG/ML
40 INJECTION, SOLUTION INTRAVENOUS AS NEEDED
Status: DISCONTINUED | OUTPATIENT
Start: 2024-09-25 | End: 2024-09-27 | Stop reason: HOSPADM

## 2024-09-25 RX ORDER — IPRATROPIUM BROMIDE AND ALBUTEROL SULFATE 2.5; .5 MG/3ML; MG/3ML
3 SOLUTION RESPIRATORY (INHALATION) EVERY 4 HOURS PRN
Status: DISCONTINUED | OUTPATIENT
Start: 2024-09-25 | End: 2024-09-27 | Stop reason: HOSPADM

## 2024-09-25 RX ORDER — BUDESONIDE AND FORMOTEROL FUMARATE DIHYDRATE 160; 4.5 UG/1; UG/1
2 AEROSOL RESPIRATORY (INHALATION)
Status: DISCONTINUED | OUTPATIENT
Start: 2024-09-25 | End: 2024-09-27 | Stop reason: HOSPADM

## 2024-09-25 RX ORDER — IPRATROPIUM BROMIDE AND ALBUTEROL SULFATE 2.5; .5 MG/3ML; MG/3ML
3 SOLUTION RESPIRATORY (INHALATION)
Status: DISCONTINUED | OUTPATIENT
Start: 2024-09-25 | End: 2024-09-27 | Stop reason: HOSPADM

## 2024-09-25 RX ORDER — IPRATROPIUM BROMIDE 21 UG/1
2 SPRAY, METERED NASAL EVERY 12 HOURS PRN
COMMUNITY

## 2024-09-25 RX ORDER — LEVOCETIRIZINE DIHYDROCHLORIDE 5 MG/1
5 TABLET, FILM COATED ORAL EVERY EVENING
COMMUNITY

## 2024-09-25 RX ORDER — ONDANSETRON 4 MG/1
4 TABLET, ORALLY DISINTEGRATING ORAL EVERY 6 HOURS PRN
Status: DISCONTINUED | OUTPATIENT
Start: 2024-09-25 | End: 2024-09-27 | Stop reason: HOSPADM

## 2024-09-25 RX ORDER — BISACODYL 10 MG
10 SUPPOSITORY, RECTAL RECTAL DAILY PRN
Status: DISCONTINUED | OUTPATIENT
Start: 2024-09-25 | End: 2024-09-27 | Stop reason: HOSPADM

## 2024-09-25 RX ORDER — LANOLIN ALCOHOL/MO/W.PET/CERES
1000 CREAM (GRAM) TOPICAL DAILY
COMMUNITY

## 2024-09-25 RX ORDER — FLUTICASONE PROPIONATE AND SALMETEROL 500; 50 UG/1; UG/1
1 POWDER RESPIRATORY (INHALATION)
COMMUNITY

## 2024-09-25 RX ORDER — ALUMINA, MAGNESIA, AND SIMETHICONE 2400; 2400; 240 MG/30ML; MG/30ML; MG/30ML
15 SUSPENSION ORAL EVERY 6 HOURS PRN
Status: DISCONTINUED | OUTPATIENT
Start: 2024-09-25 | End: 2024-09-27 | Stop reason: HOSPADM

## 2024-09-25 RX ORDER — ONDANSETRON 2 MG/ML
4 INJECTION INTRAMUSCULAR; INTRAVENOUS EVERY 6 HOURS PRN
Status: DISCONTINUED | OUTPATIENT
Start: 2024-09-25 | End: 2024-09-27 | Stop reason: HOSPADM

## 2024-09-25 RX ORDER — POLYETHYLENE GLYCOL 3350 17 G/17G
17 POWDER, FOR SOLUTION ORAL DAILY PRN
Status: DISCONTINUED | OUTPATIENT
Start: 2024-09-25 | End: 2024-09-27 | Stop reason: HOSPADM

## 2024-09-25 RX ADMIN — BUDESONIDE AND FORMOTEROL FUMARATE DIHYDRATE 2 PUFF: 160; 4.5 AEROSOL RESPIRATORY (INHALATION) at 19:37

## 2024-09-25 RX ADMIN — ENOXAPARIN SODIUM 40 MG: 100 INJECTION SUBCUTANEOUS at 21:21

## 2024-09-25 RX ADMIN — Medication 10 ML: at 21:23

## 2024-09-25 RX ADMIN — IOPAMIDOL 100 ML: 755 INJECTION, SOLUTION INTRAVENOUS at 13:56

## 2024-09-25 RX ADMIN — IPRATROPIUM BROMIDE AND ALBUTEROL SULFATE 3 ML: .5; 3 SOLUTION RESPIRATORY (INHALATION) at 19:32

## 2024-09-25 NOTE — CONSULTS
Group: [facility]         CONSULT NOTE    Patient Identification:  Mariel Parker  64 y.o.  female  1960  [id number]            Requesting physician: Attending physician    Reason for Consultation: Hemoptysis        History of Present Illness:  63 y/o female with RA-ILD, Recurrent bronchitis and recurrent hemoptysis who has been doing relatively well for the last month, she has chronic cough daily but today around 7 AM she started coughing fresh blood lasting till 10 AM.  She denies chest pain or fever, no increased sputum production from normal.    WBC 13.6, Hb 12.1  CT chest: Impression:   1. Chronic interstitial fibrotic changes within both lungs, predominant upper lobe distribution, may be related to patient's history of rheumatoid arthritis.  2. Groundglass densities in both lungs appear improved since 8/10/2024. No acute lung consolidations are identified.  3. Bronchial wall thickening. Correlate for bronchitis symptoms    Assessment:  Hemoptysis for 3 hours on 9/25/2024,   s/p bronch 8/23/24 : Rhinovirus, negative cultures at 4 wk  Hypoxemia    chronic cough     on Rituxan  rheumatoid arthritis, per rheumatology.     Interstitial lung disease no significant change on CT scan since 2020,      PFT 03/28/2024, FEV1 1.5 L/ 68%, ratio 92, TLC 69%, RV 71%, DLCO 81%  PFT June 2019 FEV1 54% TLC 61% DLCO 46%    PFT August 2016  FEV1 85% to 90% TLC 67% RV 15% DLCO 78%   PFT June 2017 showed FEV1 54-55% declined 12 months from 90%  TLC 61% predicted, DLCO 46% was 78% last year     CT chest 06/13/2023 stable lung fibrosis   progressive alveolar interstitial lung disease suggestive NSIP, 1/2023 completed slow 3 month taper of prednisone      ECHO- EF 60%, rvsp 25.8  Residual AHI 1.0, compliance 100% >4 hrs, no leak, no snoring  Hosp f/u COVID 1-2022, completed remdesivir, steroid.  Bronch with no growth.     BETTY, AHI 21.9   history of recurrent bronchitis       History of RA on  rituximab        Recommendations:  Scheduled bronchoscopy 9/26/2024  Oxygen supplement and titration to maintain saturation 90 to 95%  Bronchodilators  Symbicort (on Breztri at home)  Singulair  Hold Ofev      DVT/GI prophylaxis  Check daily labs and correct electrolytes as needed  I personally reviewed the radiological studies      Review of Sytems:  Constitutional: Negative for chills, and fever and positive for malaise/fatigue.   HENT: Negative.    Eyes: Negative.    Cardiovascular: Negative.    Respiratory: As in HPI  Skin: Negative.    Musculoskeletal: Rheumatoid arthritis symptoms are controlled  Gastrointestinal: Negative.    Genitourinary: Negative.    Neurological: Negative.    Psychiatric/Behavioral: Negative.    Past Medical History:  Past Medical History:   Diagnosis Date    Ankle sprain     Arthritis of back     Cancer     COPD (chronic obstructive pulmonary disease)     CTS (carpal tunnel syndrome)     GERD (gastroesophageal reflux disease)     Hip arthrosis     Hypertension     Interstitial lung disease     Intraocular pressure increase, bilateral     Knee swelling     Low back strain     Periarthritis of shoulder     Pulmonary fibrosis     Rheumatoid arthritis     Sleep apnea     Tennis elbow        Past Surgical History:  Past Surgical History:   Procedure Laterality Date    ACHILLES TENDON SURGERY  2009    APPENDECTOMY  1999    BRONCHOSCOPY N/A 01/29/2022    Procedure: BRONCHOSCOPY with lung washing;  Surgeon: Dana Garcia MD;  Location: [facility] ENDOSCOPY;  Service: Pulmonary;  Laterality: N/A;  post op: pneumonia    BRONCHOSCOPY N/A 8/23/2024    Procedure: BRONCHOSCOPY WITH BILATERAL LUNG WASHING;  Surgeon: Dana Garcia MD;  Location: [facility] ENDOSCOPY;  Service: Pulmonary;  Laterality: N/A;    CARPAL TUNNEL RELEASE Right 1994    FOOT SURGERY      HAND SURGERY      JOINT REPLACEMENT      SHOULDER SURGERY      TOTAL SHOULDER REVERSE ARTHROPLASTY Right 06/05/2018    TRIGGER POINT INJECTION      TUBAL  "ABDOMINAL LIGATION  2004    WRIST SURGERY          Home Meds:  (Not in a hospital admission)      Allergies:  Allergies   Allergen Reactions    Oxycodone-Acetaminophen Rash       Social History:   Social History     Socioeconomic History    Marital status:    Tobacco Use    Smoking status: Never     Passive exposure: Past    Smokeless tobacco: Never   Vaping Use    Vaping status: Never Used   Substance and Sexual Activity    Alcohol use: Not Currently     Comment: wine occasionally    Drug use: No    Sexual activity: Yes     Partners: Male     Birth control/protection: Tubal ligation       Family History:  Family History   Problem Relation Age of Onset    Hypertension Mother     Heart disease Mother     COPD Mother     Rheumatologic disease Mother     Hypertension Father     Diabetes Father     Stroke Father     Broken bones Sister     Cancer Sister        Physical Exam:  /62 (Patient Position: Sitting)   Pulse 82   Temp 98.1 °F (36.7 °C)   Resp 18   Ht 154.9 cm (61\")   Wt 109 kg (240 lb)   SpO2 100%   BMI 45.35 kg/m²  Body mass index is 45.35 kg/m². 100% 109 kg (240 lb)  General Appearance:  Alert   HEENT:  Normocephalic, without obvious abnormality, Conjunctiva/corneas clear,.   Nares normal, no drainage     Neck:  Supple, symmetrical, trachea midline. No JVD.  Lungs /Chest wall:   Bilateral basal rhonchi, respirations unlabored, symmetrical wall movement.     Heart:  Regular rate and rhythm, S1 S2 normal  Abdomen: Soft, non-tender, no masses, no organomegaly.    Extremities: No edema, no clubbing or cyanosis    LABS:  Lab Results   Component Value Date    CALCIUM 9.6 09/25/2024     Results from last 7 days   Lab Units 09/25/24  1223   SODIUM mmol/L 140   POTASSIUM mmol/L 4.3   CHLORIDE mmol/L 101   CO2 mmol/L 28.3   BUN mg/dL 14   CREATININE mg/dL 0.72   GLUCOSE mg/dL 88   CALCIUM mg/dL 9.6   WBC 10*3/mm3 13.64*   HEMOGLOBIN g/dL 12.1   PLATELETS 10*3/mm3 310   ALT (SGPT) U/L 12   AST (SGOT) " U/L 18     Lab Results   Component Value Date    CKTOTAL 70 02/15/2017    TROPONINT <0.010 09/24/2022                         Results from last 7 days   Lab Units 09/25/24  1223   INR  0.94         Lab Results   Component Value Date    TSH 2.94 02/15/2017     Estimated Creatinine Clearance: 90.1 mL/min (by C-G formula based on SCr of 0.72 mg/dL).         Imaging:  Imaging Results (Last 24 Hours)       Procedure Component Value Units Date/Time    CT Angiogram Chest Pulmonary Embolism [[id number]] Collected: 09/25/24 1400     Updated: 09/25/24 1407    Narrative:      CT ANGIOGRAM CHEST PULMONARY EMBOLISM    Date of Exam: 9/25/2024 1:47 PM EDT    Indication: hemoptysis.    Comparison: PA and lateral chest 9/25/2024. CT chest 8/10/2024.    Technique: Axial CT images were obtained of the chest after the uneventful intravenous administration of iodinated contrast utilizing pulmonary embolism protocol.  Sagittal and coronal reconstructions were performed.  Automated exposure control and   iterative reconstruction methods were used.      Findings:  Coarse interstitial fibrotic changes are present within both lungs, greatest in the upper lobes. There is traction bronchiectasis and bronchiolectasis in the bilateral upper lobes.    Groundglass densities are seen in the intervening regions interstitial thickening. The groundglass densities may represent manifestation of evolving fibrosis. Some of the groundglass densities in both lungs appear improved since the 8/10/2020 for   comparison. No dense lung consolidations are identified.    Bronchial wall thickening is present within both lungs without overt mucous plugging.    Old right rib fractures. Right shoulder replacement. No acute or suspicious osseous abnormality. Moderate thoracic spondylosis.        Impression:      Impression:    1. Chronic interstitial fibrotic changes within both lungs, predominant upper lobe distribution, may be related to patient's history of  rheumatoid arthritis.  2. Groundglass densities in both lungs appear improved since 8/10/2024. No acute lung consolidations are identified.  3. Bronchial wall thickening. Correlate for bronchitis symptoms.        Electronically Signed: Lavonne Velazquez MD    9/25/2024 2:04 PM EDT    Workstation ID: [id number]    XR Chest 2 View [[id number]] Collected: 09/25/24 1159     Updated: 09/25/24 1203    Narrative:      XR CHEST 2 VW    Date of Exam: 9/25/2024 11:30 AM EDT    Indication: hemoptysis, hx fibrosis    Comparison: CT chest 8/10/2024. PA and lateral chest 7/27/2024    Findings:  Coarse reticular interstitial thickening within both lungs has a very similar appearance to the 7/27/2024 examination, is favored to reflect changes related to chronic fibrosis. No superimposed acute airspace disease is identified. Heart size is normal.   No pleural effusion or pneumothorax or acute osseous abnormality. Right shoulder replacement changes are present.      Impression:      Impression:  Features of diffuse coarse interstitial fibrosis are thought to be similar to the prior examination. No superimposed acute airspace disease is identified.      Electronically Signed: Lavonne Velazquez MD    9/25/2024 12:01 PM EDT    Workstation ID: [id number]              Current Meds:   SCHEDULE     Infusions     PRNs    [COMPLETED] Insert Peripheral IV **AND** sodium chloride        Aviva Kirkpatrick MD  9/25/2024  15:32 EDT      Much of this encounter note is an electronic transcription/translation of spoken language to printed text using Dragon Software.

## 2024-09-25 NOTE — ED PROVIDER NOTES
Subjective   History of Present Illness  Chief complaint: Patient is 64-year-old with a history of interstitial pulmonary fibrosis.  She sees Dr. Garcia.  She woke up this morning.  She typically coughs and had normal cough for her.  Usually is yellow-green mucus.  Today she started coughing up bright red blood.  That lasted for about 3 hours.  She has since stopped when she came here.  She is not on blood thinners.  She did note red splotchy rash is just a small area to the proximal left forearm.  She noticed this yesterday.  She has not had fever.  She never felt like she had any new infection or anything.  She had a bronchoscopy at the beginning of this month.  She is not sure of the results.  She called Dr. Garcia's office and they asked her to send here.    Context:    Duration:    Timing:    Severity:    Associated Symptoms:        PCP:  LMP:      Review of Systems    Past Medical History:   Diagnosis Date    Ankle sprain     Arthritis of back     Cancer     COPD (chronic obstructive pulmonary disease)     CTS (carpal tunnel syndrome)     GERD (gastroesophageal reflux disease)     Hip arthrosis     Hypertension     Interstitial lung disease     Intraocular pressure increase, bilateral     Knee swelling     Low back strain     Periarthritis of shoulder     Pulmonary fibrosis     Rheumatoid arthritis     Sleep apnea     Tennis elbow        Allergies   Allergen Reactions    Oxycodone-Acetaminophen Rash       Past Surgical History:   Procedure Laterality Date    ACHILLES TENDON SURGERY  2009    APPENDECTOMY  1999    BRONCHOSCOPY N/A 01/29/2022    Procedure: BRONCHOSCOPY with lung washing;  Surgeon: Dana Garcia MD;  Location: [facility] ENDOSCOPY;  Service: Pulmonary;  Laterality: N/A;  post op: pneumonia    BRONCHOSCOPY N/A 8/23/2024    Procedure: BRONCHOSCOPY WITH BILATERAL LUNG WASHING;  Surgeon: Dana Garcia MD;  Location: [facility] ENDOSCOPY;  Service: Pulmonary;  Laterality: N/A;    CARPAL TUNNEL RELEASE Right 1994     FOOT SURGERY      HAND SURGERY      JOINT REPLACEMENT      SHOULDER SURGERY      TOTAL SHOULDER REVERSE ARTHROPLASTY Right 06/05/2018    TRIGGER POINT INJECTION      TUBAL ABDOMINAL LIGATION  2004    WRIST SURGERY         Family History   Problem Relation Age of Onset    Hypertension Mother     Heart disease Mother     COPD Mother     Rheumatologic disease Mother     Hypertension Father     Diabetes Father     Stroke Father     Broken bones Sister     Cancer Sister        Social History     Socioeconomic History    Marital status:    Tobacco Use    Smoking status: Never     Passive exposure: Past    Smokeless tobacco: Never   Vaping Use    Vaping status: Never Used   Substance and Sexual Activity    Alcohol use: Not Currently     Comment: wine occasionally    Drug use: No    Sexual activity: Yes     Partners: Male     Birth control/protection: Tubal ligation           Objective   Physical Exam    Procedures           ED Course  ED Course as of 09/25/24 1301   Wed Sep 25, 2024   1219 I spoke with  of pulmonology.  He recommends CT scan with contrast if creatinine is adequate.  Without contrast if there is creatinine issue. [LH]      ED Course User Index  [LH] Bo Greenfield,                                              Medical Decision Making  Patient was seen evaluate for the above problem    Differential diagnosis includes but is not limited to PE, pneumonia, CHF    Patient has chronic interstitial changes.  Her chest x-ray looks stable.  He her laboratory and Alysis noncontributory.  Will add BNP.  She does not have any chest pain or shortness of breath at this time.  Pulmonary is consulted.  I spoke with  who wanted CT of the chest.  Patient verbalized understanding admission.  I spoke with Siddhartha in the observation unit agrees to take the patient.        Amount and/or Complexity of Data Reviewed  Labs: ordered. Decision-making details documented in ED Course.     Details: Labs  reviewed by myself  Radiology: ordered and independent interpretation performed.     Details: CT reviewed by myself as above    Risk  Prescription drug management.        Final diagnoses:   None     Hemoptysis  ED Disposition  ED Disposition       None            No follow-up provider specified.       Medication List      No changes were made to your prescriptions during this visit.            Bo Greenfield DO  09/25/24 0598

## 2024-09-26 ENCOUNTER — ANESTHESIA (OUTPATIENT)
Dept: GASTROENTEROLOGY | Facility: HOSPITAL | Age: 64
End: 2024-09-26
Payer: MEDICARE

## 2024-09-26 PROBLEM — J18.9 PNEUMONIA: Status: ACTIVE | Noted: 2024-09-26

## 2024-09-26 LAB
ANION GAP SERPL CALCULATED.3IONS-SCNC: 10.2 MMOL/L (ref 5–15)
B PARAPERT DNA SPEC QL NAA+PROBE: NOT DETECTED
B PERT DNA SPEC QL NAA+PROBE: NOT DETECTED
BASOPHILS # BLD AUTO: 0.06 10*3/MM3 (ref 0–0.2)
BASOPHILS NFR BLD AUTO: 0.6 % (ref 0–1.5)
BUN SERPL-MCNC: 14 MG/DL (ref 8–23)
BUN/CREAT SERPL: 18.7 (ref 7–25)
C PNEUM DNA NPH QL NAA+NON-PROBE: NOT DETECTED
CALCIUM SPEC-SCNC: 9.3 MG/DL (ref 8.6–10.5)
CHLORIDE SERPL-SCNC: 103 MMOL/L (ref 98–107)
CO2 SERPL-SCNC: 27.8 MMOL/L (ref 22–29)
CREAT SERPL-MCNC: 0.75 MG/DL (ref 0.57–1)
DEPRECATED RDW RBC AUTO: 52.4 FL (ref 37–54)
EGFRCR SERPLBLD CKD-EPI 2021: 89 ML/MIN/1.73
EOSINOPHIL # BLD AUTO: 0.36 10*3/MM3 (ref 0–0.4)
EOSINOPHIL NFR BLD AUTO: 3.5 % (ref 0.3–6.2)
ERYTHROCYTE [DISTWIDTH] IN BLOOD BY AUTOMATED COUNT: 15.6 % (ref 12.3–15.4)
FLUAV SUBTYP SPEC NAA+PROBE: NOT DETECTED
FLUBV RNA ISLT QL NAA+PROBE: NOT DETECTED
GLUCOSE SERPL-MCNC: 99 MG/DL (ref 65–99)
HADV DNA SPEC NAA+PROBE: NOT DETECTED
HCOV 229E RNA SPEC QL NAA+PROBE: NOT DETECTED
HCOV HKU1 RNA SPEC QL NAA+PROBE: NOT DETECTED
HCOV NL63 RNA SPEC QL NAA+PROBE: NOT DETECTED
HCOV OC43 RNA SPEC QL NAA+PROBE: NOT DETECTED
HCT VFR BLD AUTO: 37.7 % (ref 34–46.6)
HGB BLD-MCNC: 11.8 G/DL (ref 12–15.9)
HMPV RNA NPH QL NAA+NON-PROBE: NOT DETECTED
HPIV1 RNA ISLT QL NAA+PROBE: NOT DETECTED
HPIV2 RNA SPEC QL NAA+PROBE: NOT DETECTED
HPIV3 RNA NPH QL NAA+PROBE: NOT DETECTED
HPIV4 P GENE NPH QL NAA+PROBE: NOT DETECTED
IMM GRANULOCYTES # BLD AUTO: 0.04 10*3/MM3 (ref 0–0.05)
IMM GRANULOCYTES NFR BLD AUTO: 0.4 % (ref 0–0.5)
LYMPHOCYTES # BLD AUTO: 1.82 10*3/MM3 (ref 0.7–3.1)
LYMPHOCYTES NFR BLD AUTO: 17.7 % (ref 19.6–45.3)
M PNEUMO IGG SER IA-ACNC: NOT DETECTED
MAGNESIUM SERPL-MCNC: 2.1 MG/DL (ref 1.6–2.4)
MCH RBC QN AUTO: 28.7 PG (ref 26.6–33)
MCHC RBC AUTO-ENTMCNC: 31.3 G/DL (ref 31.5–35.7)
MCV RBC AUTO: 91.7 FL (ref 79–97)
MONOCYTES # BLD AUTO: 1.21 10*3/MM3 (ref 0.1–0.9)
MONOCYTES NFR BLD AUTO: 11.8 % (ref 5–12)
NEUTROPHILS NFR BLD AUTO: 6.8 10*3/MM3 (ref 1.7–7)
NEUTROPHILS NFR BLD AUTO: 66 % (ref 42.7–76)
NRBC BLD AUTO-RTO: 0 /100 WBC (ref 0–0.2)
PLATELET # BLD AUTO: 323 10*3/MM3 (ref 140–450)
PMV BLD AUTO: 11 FL (ref 6–12)
POTASSIUM SERPL-SCNC: 4.1 MMOL/L (ref 3.5–5.2)
QT INTERVAL: 300 MS
QTC INTERVAL: 433 MS
RBC # BLD AUTO: 4.11 10*6/MM3 (ref 3.77–5.28)
RHINOVIRUS RNA SPEC NAA+PROBE: DETECTED
RSV RNA NPH QL NAA+NON-PROBE: NOT DETECTED
SARS-COV-2 RNA NPH QL NAA+NON-PROBE: NOT DETECTED
SODIUM SERPL-SCNC: 141 MMOL/L (ref 136–145)
WBC NRBC COR # BLD AUTO: 10.29 10*3/MM3 (ref 3.4–10.8)

## 2024-09-26 PROCEDURE — 25010000002 CEFTRIAXONE PER 250 MG: Performed by: INTERNAL MEDICINE

## 2024-09-26 PROCEDURE — 88108 CYTOPATH CONCENTRATE TECH: CPT | Performed by: INTERNAL MEDICINE

## 2024-09-26 PROCEDURE — 25010000002 PROPOFOL 10 MG/ML EMULSION

## 2024-09-26 PROCEDURE — 25010000002 ENOXAPARIN PER 10 MG: Performed by: PHYSICIAN ASSISTANT

## 2024-09-26 PROCEDURE — 94761 N-INVAS EAR/PLS OXIMETRY MLT: CPT

## 2024-09-26 PROCEDURE — 87206 SMEAR FLUORESCENT/ACID STAI: CPT | Performed by: INTERNAL MEDICINE

## 2024-09-26 PROCEDURE — 87185 SC STD ENZYME DETCJ PER NZM: CPT | Performed by: INTERNAL MEDICINE

## 2024-09-26 PROCEDURE — 87077 CULTURE AEROBIC IDENTIFY: CPT | Performed by: INTERNAL MEDICINE

## 2024-09-26 PROCEDURE — 94799 UNLISTED PULMONARY SVC/PX: CPT

## 2024-09-26 PROCEDURE — 25010000002 EPINEPHRINE PER 0.1 MG: Performed by: INTERNAL MEDICINE

## 2024-09-26 PROCEDURE — 80048 BASIC METABOLIC PNL TOTAL CA: CPT | Performed by: PHYSICIAN ASSISTANT

## 2024-09-26 PROCEDURE — 94664 DEMO&/EVAL PT USE INHALER: CPT

## 2024-09-26 PROCEDURE — 93010 ELECTROCARDIOGRAM REPORT: CPT | Performed by: INTERNAL MEDICINE

## 2024-09-26 PROCEDURE — 83735 ASSAY OF MAGNESIUM: CPT | Performed by: PHYSICIAN ASSISTANT

## 2024-09-26 PROCEDURE — 87205 SMEAR GRAM STAIN: CPT | Performed by: INTERNAL MEDICINE

## 2024-09-26 PROCEDURE — 3E1F88Z IRRIGATION OF RESPIRATORY TRACT USING IRRIGATING SUBSTANCE, VIA NATURAL OR ARTIFICIAL OPENING ENDOSCOPIC: ICD-10-PCS | Performed by: INTERNAL MEDICINE

## 2024-09-26 PROCEDURE — 25010000002 ONDANSETRON PER 1 MG: Performed by: PHYSICIAN ASSISTANT

## 2024-09-26 PROCEDURE — 87116 MYCOBACTERIA CULTURE: CPT | Performed by: INTERNAL MEDICINE

## 2024-09-26 PROCEDURE — 87102 FUNGUS ISOLATION CULTURE: CPT | Performed by: INTERNAL MEDICINE

## 2024-09-26 PROCEDURE — 87252 VIRUS INOCULATION TISSUE: CPT | Performed by: INTERNAL MEDICINE

## 2024-09-26 PROCEDURE — 0B9G8ZX DRAINAGE OF LEFT UPPER LUNG LOBE, VIA NATURAL OR ARTIFICIAL OPENING ENDOSCOPIC, DIAGNOSTIC: ICD-10-PCS | Performed by: INTERNAL MEDICINE

## 2024-09-26 PROCEDURE — 25810000003 SODIUM CHLORIDE 0.9 % SOLUTION

## 2024-09-26 PROCEDURE — 87071 CULTURE AEROBIC QUANT OTHER: CPT | Performed by: INTERNAL MEDICINE

## 2024-09-26 PROCEDURE — 0202U NFCT DS 22 TRGT SARS-COV-2: CPT | Performed by: INTERNAL MEDICINE

## 2024-09-26 PROCEDURE — 85025 COMPLETE CBC W/AUTO DIFF WBC: CPT | Performed by: PHYSICIAN ASSISTANT

## 2024-09-26 PROCEDURE — 93005 ELECTROCARDIOGRAM TRACING: CPT

## 2024-09-26 PROCEDURE — 87798 DETECT AGENT NOS DNA AMP: CPT | Performed by: INTERNAL MEDICINE

## 2024-09-26 RX ORDER — EPINEPHRINE 1 MG/ML
INJECTION, SOLUTION, CONCENTRATE INTRAVENOUS AS NEEDED
Status: DISCONTINUED | OUTPATIENT
Start: 2024-09-26 | End: 2024-09-26 | Stop reason: HOSPADM

## 2024-09-26 RX ORDER — CETIRIZINE HYDROCHLORIDE 10 MG/1
10 TABLET ORAL DAILY
Status: DISCONTINUED | OUTPATIENT
Start: 2024-09-26 | End: 2024-09-27 | Stop reason: HOSPADM

## 2024-09-26 RX ORDER — ACETAMINOPHEN 325 MG/1
650 TABLET ORAL EVERY 6 HOURS PRN
Status: DISCONTINUED | OUTPATIENT
Start: 2024-09-26 | End: 2024-09-26

## 2024-09-26 RX ORDER — LIDOCAINE HYDROCHLORIDE 20 MG/ML
INJECTION, SOLUTION EPIDURAL; INFILTRATION; INTRACAUDAL; PERINEURAL AS NEEDED
Status: DISCONTINUED | OUTPATIENT
Start: 2024-09-26 | End: 2024-09-26 | Stop reason: SURG

## 2024-09-26 RX ORDER — MONTELUKAST SODIUM 10 MG/1
10 TABLET ORAL NIGHTLY
Status: DISCONTINUED | OUTPATIENT
Start: 2024-09-26 | End: 2024-09-27 | Stop reason: HOSPADM

## 2024-09-26 RX ORDER — ALBUTEROL SULFATE 0.83 MG/ML
2.5 SOLUTION RESPIRATORY (INHALATION) EVERY 6 HOURS PRN
Status: DISCONTINUED | OUTPATIENT
Start: 2024-09-26 | End: 2024-09-27 | Stop reason: HOSPADM

## 2024-09-26 RX ORDER — SODIUM CHLORIDE 9 MG/ML
INJECTION, SOLUTION INTRAVENOUS CONTINUOUS PRN
Status: DISCONTINUED | OUTPATIENT
Start: 2024-09-26 | End: 2024-09-26 | Stop reason: SURG

## 2024-09-26 RX ORDER — PROPOFOL 10 MG/ML
VIAL (ML) INTRAVENOUS AS NEEDED
Status: DISCONTINUED | OUTPATIENT
Start: 2024-09-26 | End: 2024-09-26 | Stop reason: SURG

## 2024-09-26 RX ORDER — FAMOTIDINE 20 MG/1
20 TABLET, FILM COATED ORAL
Status: DISCONTINUED | OUTPATIENT
Start: 2024-09-26 | End: 2024-09-27 | Stop reason: ALTCHOICE

## 2024-09-26 RX ORDER — CITALOPRAM HYDROBROMIDE 20 MG/1
40 TABLET ORAL EVERY MORNING
Status: DISCONTINUED | OUTPATIENT
Start: 2024-09-26 | End: 2024-09-27 | Stop reason: HOSPADM

## 2024-09-26 RX ORDER — LIDOCAINE HYDROCHLORIDE 20 MG/ML
INJECTION, SOLUTION INFILTRATION; PERINEURAL AS NEEDED
Status: DISCONTINUED | OUTPATIENT
Start: 2024-09-26 | End: 2024-09-26 | Stop reason: HOSPADM

## 2024-09-26 RX ORDER — ACETAMINOPHEN 325 MG/1
650 TABLET ORAL EVERY 6 HOURS PRN
Status: DISCONTINUED | OUTPATIENT
Start: 2024-09-26 | End: 2024-09-27 | Stop reason: HOSPADM

## 2024-09-26 RX ORDER — UREA 10 %
1000 LOTION (ML) TOPICAL DAILY
Status: DISCONTINUED | OUTPATIENT
Start: 2024-09-26 | End: 2024-09-27 | Stop reason: HOSPADM

## 2024-09-26 RX ORDER — PANTOPRAZOLE SODIUM 40 MG/1
40 TABLET, DELAYED RELEASE ORAL DAILY
Status: DISCONTINUED | OUTPATIENT
Start: 2024-09-26 | End: 2024-09-27 | Stop reason: HOSPADM

## 2024-09-26 RX ORDER — LOSARTAN POTASSIUM 50 MG/1
100 TABLET ORAL NIGHTLY
Status: DISCONTINUED | OUTPATIENT
Start: 2024-09-26 | End: 2024-09-27 | Stop reason: HOSPADM

## 2024-09-26 RX ADMIN — ONDANSETRON 4 MG: 2 INJECTION, SOLUTION INTRAMUSCULAR; INTRAVENOUS at 11:51

## 2024-09-26 RX ADMIN — CITALOPRAM HYDROBROMIDE 40 MG: 20 TABLET ORAL at 10:52

## 2024-09-26 RX ADMIN — CYANOCOBALAMIN TAB 500 MCG 1000 MCG: 500 TAB at 10:52

## 2024-09-26 RX ADMIN — SODIUM CHLORIDE: 9 INJECTION, SOLUTION INTRAVENOUS at 08:03

## 2024-09-26 RX ADMIN — PROPOFOL INJECTABLE EMULSION 100 MG: 10 INJECTION, EMULSION INTRAVENOUS at 08:07

## 2024-09-26 RX ADMIN — ACETAMINOPHEN 650 MG: 325 TABLET, FILM COATED ORAL at 11:51

## 2024-09-26 RX ADMIN — LOSARTAN POTASSIUM 100 MG: 50 TABLET, FILM COATED ORAL at 21:18

## 2024-09-26 RX ADMIN — IPRATROPIUM BROMIDE AND ALBUTEROL SULFATE 3 ML: .5; 3 SOLUTION RESPIRATORY (INHALATION) at 12:12

## 2024-09-26 RX ADMIN — BUDESONIDE AND FORMOTEROL FUMARATE DIHYDRATE 2 PUFF: 160; 4.5 AEROSOL RESPIRATORY (INHALATION) at 19:54

## 2024-09-26 RX ADMIN — ENOXAPARIN SODIUM 40 MG: 100 INJECTION SUBCUTANEOUS at 10:51

## 2024-09-26 RX ADMIN — PANTOPRAZOLE SODIUM 40 MG: 40 TABLET, DELAYED RELEASE ORAL at 10:52

## 2024-09-26 RX ADMIN — IPRATROPIUM BROMIDE AND ALBUTEROL SULFATE 3 ML: .5; 3 SOLUTION RESPIRATORY (INHALATION) at 19:49

## 2024-09-26 RX ADMIN — FAMOTIDINE 20 MG: 20 TABLET, FILM COATED ORAL at 21:18

## 2024-09-26 RX ADMIN — CEFTRIAXONE 2000 MG: 2 INJECTION, POWDER, FOR SOLUTION INTRAMUSCULAR; INTRAVENOUS at 10:51

## 2024-09-26 RX ADMIN — MONTELUKAST 10 MG: 10 TABLET, FILM COATED ORAL at 21:18

## 2024-09-26 RX ADMIN — ENOXAPARIN SODIUM 40 MG: 100 INJECTION SUBCUTANEOUS at 21:18

## 2024-09-26 RX ADMIN — LIDOCAINE HYDROCHLORIDE 60 MG: 20 INJECTION, SOLUTION EPIDURAL; INFILTRATION; INTRACAUDAL; PERINEURAL at 08:07

## 2024-09-26 RX ADMIN — Medication 10 ML: at 21:21

## 2024-09-26 NOTE — PROCEDURES
Bronchoscopy Procedure Note    Procedure:  Bronchoscopy, Diagnostic  Bronchoscopy, Therapeutic lavage of multiple mucous plugs  Bronchoalveolar lavage, BAL from left upper lobe    Pre-Operative Diagnosis: Hemoptysis and multiple mucous plugs    Post-Operative Diagnosis: Same    Indication: Bronchiectasis, chronic cough and hemoptysis    Anesthesia: Monitored Anesthesia Care (MAC)    Procedure Details: Patient was consented for the procedure with all risk and benefit of the procedure explained in detail.  Patient was given the opportunity to ask questions and all concerns were answered.    Timeout was done in the standard manner   the bronchoscope was inserted into the main airway via the oropharynx. An anatomical survey was done of the main airways and the subsegmental bronchus of the 5 lobes.  The findings are consistent of large amount of purulent secretions from 5 lobes but especially from the left upper lobe, minimal bleeding from left upper lobe but no obvious endobronchial lesion.  A therapeutic lavage was performed using aliquots of normal saline instilled into the airways then aspirated back until clear.  5 mL of epinephrine 1: 10,000 was instilled in the left upper lobe to control the mild hemoptysis.    Estimated Blood Loss: Minimal           Specimens: Diagnostic BAL was performed left upper lobe by instilling 90 mL of sterile normal saline and return of 45 mL                Complications:  None; patient tolerated the procedure well.           Disposition: PACU - hemodynamically stable.    Post op plan:  Resume p.o. after 2 hours  Follow-up results  Start antibiotics until the cultures are back    Patient tolerated the procedure well.

## 2024-09-26 NOTE — NURSING NOTE
Call rec'd from hospitalist group. Pt is optum patient and Dr Juarez should assume inpt care. Call placed to inform Dr Juarez.of inpt admission.

## 2024-09-26 NOTE — NURSING NOTE
Patient given IV zofran at 1151 d/t Vomiting associated with gagging on phlegm post-bronch. Patient HR became elevated at 120-140. EKG ordered. Vitals show elevated temp at 99.6. Patient already rec'd IV abx this AM. EKG shows Sinus tach. Liz CHARLES aware.

## 2024-09-26 NOTE — H&P
Formerly [facility] H&P    Patient Name: Mariel Parker  : 1960  MRN: [id number]  Primary Care Physician: Valerio George MD  Date of admission: 2024     Patient Care Team:  Valerio George MD as PCP - General (Family Medicine)          Subjective   History Present Illness     Chief Complaint:   Chief Complaint   Patient presents with    Coughing Up Blood     Pt states she started coughing up blood this morning around 7am  Pt has a chronic cough, has history of pulmonary fibrosis and spoke with Dr. Garcia and told to come to the ER for eval.       History of Present Illness  Obtained from admitting physician HPI on 2024:  Chief complaint: Patient is 64-year-old with a history of interstitial pulmonary fibrosis.  She sees Dr. Garcia.  She woke up this morning.  She typically coughs and had normal cough for her.  Usually is yellow-green mucus.  Today she started coughing up bright red blood.  That lasted for about 3 hours.  She has since stopped when she came here.  She is not on blood thinners.  She did note red splotchy rash is just a small area to the proximal left forearm.  She noticed this yesterday.  She has not had fever.  She never felt like she had any new infection or anything.  She had a bronchoscopy at the beginning of this month.  She is not sure of the results.  She called Dr. Garcia's office and they asked her to send here.     24:  Patient confirms the HPI noted above including some worsening cough with development of hemoptysis on the day of presentation.  She continues to note some purulent sputum as well.  She notes some dyspnea which she feels is generally at her baseline and confirms compliance all of her outpatient medications including CPAP therapy.  She also notes that she does clean her CPAP equipment regularly.  She was evaluated post bronchoscopy on  reporting a mild headache and nausea as well.        Review of Systems   Constitutional: Negative.   Eyes: Negative.     Cardiovascular: Negative.    Respiratory:  Positive for cough, hemoptysis, shortness of breath, sputum production and wheezing.    Skin: Negative.    Musculoskeletal: Negative.    Gastrointestinal: Negative.    Genitourinary: Negative.    Neurological:  Positive for headaches.   Psychiatric/Behavioral: Negative.         Personal History     Past Medical History:   Past Medical History:   Diagnosis Date    Ankle sprain     Arthritis of back     Cancer     COPD (chronic obstructive pulmonary disease)     CTS (carpal tunnel syndrome)     GERD (gastroesophageal reflux disease)     Hip arthrosis     Hypertension     Interstitial lung disease     Intraocular pressure increase, bilateral     Knee swelling     Low back strain     Periarthritis of shoulder     Pulmonary fibrosis     Rheumatoid arthritis     Sleep apnea     Tennis elbow        Surgical History:      Past Surgical History:   Procedure Laterality Date    ACHILLES TENDON SURGERY  2009    APPENDECTOMY  1999    BRONCHOSCOPY N/A 01/29/2022    Procedure: BRONCHOSCOPY with lung washing;  Surgeon: Dana Garcia MD;  Location: [facility];  Service: Pulmonary;  Laterality: N/A;  post op: pneumonia    BRONCHOSCOPY N/A 8/23/2024    Procedure: BRONCHOSCOPY WITH BILATERAL LUNG WASHING;  Surgeon: Dana Garcia MD;  Location: [facility];  Service: Pulmonary;  Laterality: N/A;    CARPAL TUNNEL RELEASE Right 1994    FOOT SURGERY      HAND SURGERY      JOINT REPLACEMENT      SHOULDER SURGERY      TOTAL SHOULDER REVERSE ARTHROPLASTY Right 06/05/2018    TRIGGER POINT INJECTION      TUBAL ABDOMINAL LIGATION  2004    WRIST SURGERY             Family History: family history includes Broken bones in her sister; COPD in her mother; Cancer in her sister; Diabetes in her father; Heart disease in her mother; Hypertension in her father and mother; Rheumatologic disease in her mother; Stroke in her father. Otherwise pertinent FHx was reviewed and unremarkable.     Social History:   reports that she has never smoked. She has been exposed to tobacco smoke. She has never used smokeless tobacco. She reports that she does not currently use alcohol. She reports that she does not use drugs.      Medications:  Prior to Admission medications    Medication Sig Start Date End Date Taking? Authorizing Provider   B Complex Vitamins (VITAMIN B COMPLEX) capsule capsule Take 1 capsule by mouth Daily. 4/20/17  Yes Leti Fernando MD   Calcium Citrate-Vitamin D 200-125 MG-UNIT tablet Take 1 tablet by mouth 2 (Two) Times a Day.   Yes Leti Fernando MD   citalopram (CeleXA) 40 MG tablet Take 1 tablet by mouth Every Morning. 6/5/20  Yes Jose Stern MD   famotidine (PEPCID) 40 MG tablet Take 1 tablet by mouth Daily.   Yes Leti Fernando MD   fluticasone (FLONASE) 50 MCG/ACT nasal spray Administer 1 spray into the nostril(s) as directed by provider 2 (Two) Times a Day.   Yes Leti Fernando MD   Fluticasone-Salmeterol (ADVAIR/WIXELA) 500-50 MCG/ACT DISKUS Inhale 1 puff 2 (Two) Times a Day.   Yes Leti Fernando MD   levocetirizine (XYZAL) 5 MG tablet Take 1 tablet by mouth Every Evening.   Yes Leti Fernando MD   losartan (Cozaar) 100 MG tablet Take 1 tablet by mouth Daily.  Patient taking differently: Take 1 tablet by mouth Every Night. 6/5/20  Yes Jose Stern MD   montelukast (SINGULAIR) 10 MG tablet Take 1 tablet by mouth Every Night.   Yes Emergency, Nurse Epic, RN   Multiple Vitamins-Minerals (EYE VITAMINS & MINERALS) tablet Take 2 tablets by mouth Daily. 4/9/18  Yes Leti Fernando MD   Nintedanib Esylate (Ofev) 100 MG capsule Take 1 capsule by mouth 2 (Two) Times a Day.   Yes Leti Fernando MD   pantoprazole (PROTONIX) 40 MG EC tablet Take 1 tablet by mouth Daily. 5/21/18  Yes Leti Fernando MD   vitamin B-12 (CYANOCOBALAMIN) 1000 MCG tablet Take 1 tablet by mouth Daily.   Yes Leti Fernando MD   albuterol sulfate HFA  (ProAir HFA) 108 (90 Base) MCG/ACT inhaler Inhale 2 puffs 4 (Four) Times a Day. 1/29/22   Georgie Jacobsen APRN   ipratropium (ATROVENT) 0.03 % nasal spray Administer 2 sprays into the nostril(s) as directed by provider Every 12 (Twelve) Hours As Needed for Rhinitis.    Provider, MD Leti   riTUXimab (RITUXAN) 10 MG/ML solution injection Infuse  into a venous catheter Every 6 (Six) Months.    Provider, Historical, MD       Allergies:    Allergies   Allergen Reactions    Oxycodone-Acetaminophen Rash       Objective   Objective     Vital Signs  Temp:  [97.8 °F (36.6 °C)-99 °F (37.2 °C)] 97.8 °F (36.6 °C)  Heart Rate:  [] 100  Resp:  [16-28] 22  BP: (111-184)/(55-81) 177/77  SpO2:  [92 %-100 %] 94 %  on  Flow (L/min):  [10] 10;   Device (Oxygen Therapy): room air  Body mass index is 44.74 kg/m².    Physical Exam  Vitals reviewed.   Constitutional:       General: She is not in acute distress.     Appearance: Normal appearance. She is obese. She is not ill-appearing, toxic-appearing or diaphoretic.   HENT:      Head: Normocephalic.      Right Ear: External ear normal.      Left Ear: External ear normal.      Nose: Nose normal.      Mouth/Throat:      Mouth: Mucous membranes are moist.   Eyes:      Extraocular Movements: Extraocular movements intact.   Cardiovascular:      Rate and Rhythm: Normal rate and regular rhythm.      Pulses: Normal pulses.      Heart sounds: Murmur heard.   Pulmonary:      Effort: Pulmonary effort is normal.      Breath sounds: Wheezing and rhonchi present.   Abdominal:      Palpations: Abdomen is soft.      Comments: Bowel sounds hyperactive   Musculoskeletal:      Cervical back: Normal range of motion.      Right lower leg: No edema.      Left lower leg: No edema.   Skin:     General: Skin is warm and dry.      Capillary Refill: Capillary refill takes less than 2 seconds.   Neurological:      General: No focal deficit present.      Mental Status: She is alert.   Psychiatric:          Mood and Affect: Mood normal.         Behavior: Behavior normal.         Thought Content: Thought content normal.         Judgment: Judgment normal.         Results Review:  I have personally reviewed most recent lab results and radiology images and interpretations and agree with findings, most notably: proBNP, CBC, CMP, chest x-ray, CTA of chest.    Results from last 7 days   Lab Units 09/26/24  0355 09/25/24  1223   WBC 10*3/mm3 10.29 13.64*   HEMOGLOBIN g/dL 11.8* 12.1   HEMATOCRIT % 37.7 39.2   PLATELETS 10*3/mm3 323 310   INR   --  0.94     Results from last 7 days   Lab Units 09/26/24  0355 09/25/24  1632 09/25/24  1223   SODIUM mmol/L 141  --  140   POTASSIUM mmol/L 4.1  --  4.3   CHLORIDE mmol/L 103  --  101   CO2 mmol/L 27.8  --  28.3   BUN mg/dL 14  --  14   CREATININE mg/dL 0.75  --  0.72   GLUCOSE mg/dL 99  --  88   CALCIUM mg/dL 9.3  --  9.6   ALK PHOS U/L  --   --  90   ALT (SGPT) U/L  --   --  12   AST (SGOT) U/L  --   --  18   PROBNP pg/mL  --  207.0  --      Estimated Creatinine Clearance: 85.5 mL/min (by C-G formula based on SCr of 0.75 mg/dL).  Brief Urine Lab Results       None            Microbiology Results (last 10 days)       Procedure Component Value - Date/Time    BAL Culture, Quantitative - Lavage, Lung, Left Upper Lobe [[id number]] Collected: 09/26/24 0811    Lab Status: Preliminary result Specimen: Lavage from Lung, Left Upper Lobe Updated: 09/26/24 1024     Gram Stain Moderate (3+) WBCs seen      Few (2+) Mixed bacterial morphotypes seen on Gram Stain    Respiratory Panel PCR w/COVID-19(SARS-CoV-2) RAYMON/GRANT/LYN/PAD/COR/MARINA In-House, NP Swab in [place] ([facility] 2 HR TAT - Lavage, Lung, Left Upper Lobe [[id number]]  (Abnormal) Collected: 09/26/24 0811    Lab Status: Final result Specimen: Lavage from Lung, Left Upper Lobe Updated: 09/26/24 1040     ADENOVIRUS, PCR Not Detected     Coronavirus 229E Not Detected     Coronavirus HKU1 Not Detected     Coronavirus NL63 Not Detected     Coronavirus OC43  Not Detected     COVID19 Not Detected     Human Metapneumovirus Not Detected     Human Rhinovirus/Enterovirus Detected     Influenza A PCR Not Detected     Influenza B PCR Not Detected     Parainfluenza Virus 1 Not Detected     Parainfluenza Virus 2 Not Detected     Parainfluenza Virus 3 Not Detected     Parainfluenza Virus 4 Not Detected     RSV, PCR Not Detected     Bordetella pertussis pcr Not Detected     Bordetella parapertussis PCR Not Detected     Chlamydophila pneumoniae PCR Not Detected     Mycoplasma pneumo by PCR Not Detected    Narrative:      In the setting of a positive respiratory panel with a viral infection PLUS a negative procalcitonin without other underlying concern for bacterial infection, consider observing off antibiotics or discontinuation of antibiotics and continue supportive care. If the respiratory panel is positive for atypical bacterial infection (Bordetella pertussis, Chlamydophila pneumoniae, or Mycoplasma pneumoniae), consider antibiotic de-escalation to target atypical bacterial infection.    Respiratory Culture - Sputum, Cough [[id number]] Collected: 09/25/24 1223    Lab Status: Preliminary result Specimen: Sputum from Cough Updated: 09/25/24 1315     Gram Stain Many (4+) WBCs per low power field      Rare (1+) Epithelial cells per low power field      Moderate (3+) Gram negative bacilli, tiny      Few (2+) Gram positive cocci in pairs, chains and clusters            ECG/EMG Results (most recent)       Procedure Component Value Units Date/Time    Telemetry Scan [[id number]] Resulted: 09/25/24     Updated: 09/26/24 0827    Telemetry Scan [[id number]] Resulted: 09/25/24     Updated: 09/26/24 0842    Telemetry Scan [[id number]] Resulted: 09/25/24     Updated: 09/26/24 0842                Results for orders placed during the hospital encounter of 07/28/22    Adult Transthoracic Echo Complete W/ Cont if Necessary Per Protocol    Interpretation Summary  · Estimated left ventricular EF = 60%  Left ventricular systolic function is normal.    Indication  Heart failure    Technically satisfactory study.  Mitral valve is structurally normal.  Tricuspid valve is structurally normal.  Aortic valve is structurally normal.  Pulmonic valve could not be well visualized.  No evidence for mitral tricuspid or aortic regurgitation is seen by Doppler study.  Left atrium is enlarged.  Right atrium is normal in size.  Left ventricle is normal in size and contractility with ejection fraction of 60%.  Right ventricle is normal in size.  Atrial septum is intact.  Aorta is normal.  No pericardial effusion or intracardiac thrombus is seen.    Impression  Left atrial enlargement.  Normal pulmonary artery pressures (22 mmHg)  Structurally and functionally normal cardiac valves.  Left ventricular size and contractility is normal with ejection fraction of 60%.      CT Angiogram Chest Pulmonary Embolism    Result Date: 9/25/2024  Impression: 1. Chronic interstitial fibrotic changes within both lungs, predominant upper lobe distribution, may be related to patient's history of rheumatoid arthritis. 2. Groundglass densities in both lungs appear improved since 8/10/2024. No acute lung consolidations are identified. 3. Bronchial wall thickening. Correlate for bronchitis symptoms. Electronically Signed: Lavonne Velazquez MD  9/25/2024 2:04 PM EDT  Workstation ID: [id number]    XR Chest 2 View    Result Date: 9/25/2024  Impression: Features of diffuse coarse interstitial fibrosis are thought to be similar to the prior examination. No superimposed acute airspace disease is identified. Electronically Signed: Lavonne Velazquez MD  9/25/2024 12:01 PM EDT  Workstation ID: [id number]       Estimated Creatinine Clearance: 85.5 mL/min (by C-G formula based on SCr of 0.75 mg/dL).    Assessment & Plan   Assessment/Plan       Active Hospital Problems    Diagnosis  POA    **Hemoptysis [R04.2]  Yes      Resolved Hospital Problems   No resolved problems to  display.     Hemoptysis  -proBNP: 207.0  -WBCs 13.64 with an increased absolute neutrophil and monocyte count noted on differential, trended to 10.29 on the morning following  -Hemoglobin: 12.1-11.8  -Chest x-ray showed diffuse coarse interstitial fibrosis noted by radiologist is similar to prior exam with no superimposed acute airspace disease noted  -CTA of chest showed chronic interstitial fibrotic changes in both lungs predominantly in the upper lobes likely related to history of rheumatoid arthritis per radiologist with groundglass densities in both lungs improved since 8/10 with no acute lung consolidations noted.  Bronchial wall thickening was also present.  -Pulmonology consulted who performed bronchoscopy on 9/26/2024 and started cefepime  -Hospitalist consulted     Hypertension  -Poorly controlled       BP Readings from Last 1 Encounters:   09/26/24 177/77   - Continue losartan  - Monitor while admitted     GERD  -PPI     Depression/anxiety  -Celexa     RA  -Recommend continue compliance with outpatient therapies            VTE Prophylaxis - Active VTE Prophylaxis:  Pharmacologic:        Start     Dose Route Frequency Stop    09/25/24 2100  Enoxaparin Sodium (LOVENOX) syringe 40 mg         40 mg SC Every 12 Hours --                  Select Mechanical VTE Prophylaxis if Desired & Appropriate      CODE STATUS:    Code Status and Medical Interventions: CPR (Attempt to Resuscitate); Full Support   Ordered at: 09/25/24 1642     Code Status (Patient has no pulse and is not breathing):    CPR (Attempt to Resuscitate)     Medical Interventions (Patient has pulse or is breathing):    Full Support       This patient has been examined wearing personal protective equipment.     I discussed the patient's findings and my recommendations with patient and nursing staff.      Signature:Electronically signed by Sebastián Chand PA-C, 09/26/24, 11:47 AM EDT.

## 2024-09-26 NOTE — PLAN OF CARE
Patient is up ad beatriz. Still coughing and now with inc HR with movement. No mobility deficits, safe to return home once medically stable. No skilled rehab needs at this time. Will complete order.

## 2024-09-26 NOTE — PLAN OF CARE
Problem: Adult Inpatient Plan of Care  Goal: Plan of Care Review  Outcome: Progressing  Flowsheets (Taken 9/26/2024 0617)  Progress: improving  Plan of Care Reviewed With: patient  Goal: Patient-Specific Goal (Individualized)  Outcome: Progressing  Goal: Absence of Hospital-Acquired Illness or Injury  Outcome: Progressing  Intervention: Identify and Manage Fall Risk  Recent Flowsheet Documentation  Taken 9/26/2024 0616 by Nayeli Ro RN  Safety Promotion/Fall Prevention: safety round/check completed  Taken 9/26/2024 0445 by Nayeli Ro RN  Safety Promotion/Fall Prevention: safety round/check completed  Taken 9/26/2024 0220 by Nayeli Ro RN  Safety Promotion/Fall Prevention: safety round/check completed  Taken 9/26/2024 0020 by Nayeli Ro RN  Safety Promotion/Fall Prevention: safety round/check completed  Taken 9/25/2024 2205 by Nayeli Ro RN  Safety Promotion/Fall Prevention: safety round/check completed  Intervention: Prevent and Manage VTE (Venous Thromboembolism) Risk  Recent Flowsheet Documentation  Taken 9/25/2024 2100 by Nayeli Ro RN  VTE Prevention/Management: sequential compression devices off  Intervention: Prevent Infection  Recent Flowsheet Documentation  Taken 9/25/2024 2205 by Nayeli Ro RN  Infection Prevention: hand hygiene promoted  Goal: Optimal Comfort and Wellbeing  Outcome: Progressing  Intervention: Provide Person-Centered Care  Recent Flowsheet Documentation  Taken 9/25/2024 2100 by Nayeli Ro RN  Trust Relationship/Rapport:   care explained   questions answered  Goal: Readiness for Transition of Care  Outcome: Progressing     Problem: Hypertension Comorbidity  Goal: Blood Pressure in Desired Range  Outcome: Progressing   Goal Outcome Evaluation:  Plan of Care Reviewed With: patient        Progress: improving

## 2024-09-26 NOTE — PLAN OF CARE
Problem: Adult Inpatient Plan of Care  Goal: Plan of Care Review  Outcome: Progressing  Goal: Patient-Specific Goal (Individualized)  Outcome: Progressing  Goal: Absence of Hospital-Acquired Illness or Injury  Outcome: Progressing  Intervention: Identify and Manage Fall Risk  Recent Flowsheet Documentation  Taken 9/26/2024 1600 by Balbina Paul, ROMANA  Safety Promotion/Fall Prevention:   safety round/check completed   assistive device/personal items within reach   clutter free environment maintained   mobility aid in reach   nonskid shoes/slippers when out of bed   room organization consistent  Taken 9/26/2024 1400 by Balbina Paul RN  Safety Promotion/Fall Prevention:   assistive device/personal items within reach   clutter free environment maintained   mobility aid in reach   nonskid shoes/slippers when out of bed   safety round/check completed   room organization consistent  Taken 9/26/2024 1151 by Balbina Paul, RN  Safety Promotion/Fall Prevention:   assistive device/personal items within reach   clutter free environment maintained   mobility aid in reach   nonskid shoes/slippers when out of bed   safety round/check completed   room organization consistent  Taken 9/26/2024 0930 by Balbina Paul, ROMANA  Safety Promotion/Fall Prevention:   safety round/check completed   assistive device/personal items within reach   clutter free environment maintained   fall prevention program maintained   nonskid shoes/slippers when out of bed   room organization consistent  Taken 9/26/2024 0725 by Balbina Paul, ROMANA  Safety Promotion/Fall Prevention: (Bronch)   patient off unit   other (see comments)  Intervention: Prevent Skin Injury  Recent Flowsheet Documentation  Taken 9/26/2024 0930 by Balbina Paul, RN  Body Position: position changed independently  Skin Protection:   adhesive use limited   transparent dressing maintained  Intervention: Prevent and Manage VTE (Venous Thromboembolism) Risk  Recent Flowsheet  Documentation  Taken 9/26/2024 0930 by Balbina Paul, RN  Activity Management: up ad beatriz  VTE Prevention/Management: sequential compression devices off  Range of Motion: active ROM (range of motion) encouraged  Intervention: Prevent Infection  Recent Flowsheet Documentation  Taken 9/26/2024 1151 by Balbina Paul, RN  Infection Prevention:   hand hygiene promoted   personal protective equipment utilized   rest/sleep promoted   single patient room provided  Taken 9/26/2024 0930 by Balbina Paul, RN  Infection Prevention:   hand hygiene promoted   single patient room provided   rest/sleep promoted  Goal: Optimal Comfort and Wellbeing  Outcome: Progressing  Intervention: Provide Person-Centered Care  Recent Flowsheet Documentation  Taken 9/26/2024 0930 by Balbina Paul, ROMANA  Trust Relationship/Rapport: care explained  Goal: Readiness for Transition of Care  Outcome: Progressing   Goal Outcome Evaluation:   Patient had bronch today. Positive for rhinovirus. Pending BAL results. Possible d/c tomorrow. HR running high 120's at rest. EKG shows sinus tach otherwise normal. IV abx x1 dose given today.

## 2024-09-26 NOTE — PROGRESS NOTES
LOS: 0 days   Patient Care Team:  Valerio George MD as PCP - General (Family Medicine)    Subjective     Interval History: Patient is status post bronchoscopy with therapeutic lavage of mucous plugging and instillation of epinephrine and left upper lobe for hemoptysis    Patient Complaints: General Fatigue    History taken from: patient    Review of Systems   Constitutional:  Positive for activity change, appetite change and fatigue. Negative for diaphoresis and fever.   HENT:  Negative for facial swelling.    Eyes:  Negative for visual disturbance.   Respiratory:  Positive for cough, shortness of breath and wheezing. Negative for stridor.    Cardiovascular:  Negative for chest pain.   Gastrointestinal:  Negative for abdominal pain, constipation and diarrhea.   Genitourinary:  Negative for dysuria.   Musculoskeletal:  Negative for arthralgias and back pain.   Neurological:  Negative for tremors and weakness.   Psychiatric/Behavioral:  Negative for confusion.            Objective     Vital Signs  Temp:  [97.8 °F (36.6 °C)-99.6 °F (37.6 °C)] 98.8 °F (37.1 °C)  Heart Rate:  [] 113  Resp:  [17-28] 20  BP: (111-177)/(59-81) 132/74    Physical Exam:     General Appearance:    Alert, cooperative, in no acute distress, pleasant, overweight   Head:    Normocephalic, without obvious abnormality, atraumatic   Eyes:            Lids and lashes normal, conjunctivae and sclerae normal, no   icterus, no pallor, corneas clear, PERRLA   Ears:    Ears appear intact with no abnormalities noted   Throat:   No oral lesions, no thrush, oral mucosa moist   Neck:   No adenopathy, supple, trachea midline, no thyromegaly, no   carotid bruit, no JVD   Lungs:   Diffuse wheezing    Heart:    Regular rhythm and normal rate, normal S1 and S2, no            murmur, no gallop, no rub, no click   Chest Wall:    No abnormalities observed   Abdomen:     Normal bowel sounds, no masses, no organomegaly, soft        Non-tender non-distended, no  guarding,   Extremities:   Moves all extremities well, no edema, no cyanosis, no             Redness   Pulses:   Pulses palpable and equal bilaterally   Skin:   No bleeding, bruising or rash   Lymph nodes:   No palpable adenopathy   Neurologic:   Cranial nerves 2 - 12 grossly intact, sensation intact, DTR       present and equal bilaterally        Results Review:    Lab Results (last 24 hours)       Procedure Component Value Units Date/Time    Non-gynecologic Cytology [[id number]] Collected: 09/26/24 0811    Specimen: Lavage from Lung, Left Upper Lobe Updated: 09/26/24 1211    Respiratory Culture - Sputum, Cough [[id number]] Collected: 09/25/24 1223    Specimen: Sputum from Cough Updated: 09/26/24 1157     Gram Stain Many (4+) WBCs per low power field      Rare (1+) Epithelial cells per low power field      Moderate (3+) Gram negative bacilli, tiny      Few (2+) Gram positive cocci in pairs, chains and clusters    Narrative:      Organism under investigation.      Respiratory Panel PCR w/COVID-19(SARS-CoV-2) RAYMON/GRANT/LYN/PAD/COR/MARINA In-House, NP Swab in UTM/VTM, 2 HR TAT - Lavage, Lung, Left Upper Lobe [[id number]]  (Abnormal) Collected: 09/26/24 0811    Specimen: Lavage from Lung, Left Upper Lobe Updated: 09/26/24 1040     ADENOVIRUS, PCR Not Detected     Coronavirus 229E Not Detected     Coronavirus HKU1 Not Detected     Coronavirus NL63 Not Detected     Coronavirus OC43 Not Detected     COVID19 Not Detected     Human Metapneumovirus Not Detected     Human Rhinovirus/Enterovirus Detected     Influenza A PCR Not Detected     Influenza B PCR Not Detected     Parainfluenza Virus 1 Not Detected     Parainfluenza Virus 2 Not Detected     Parainfluenza Virus 3 Not Detected     Parainfluenza Virus 4 Not Detected     RSV, PCR Not Detected     Bordetella pertussis pcr Not Detected     Bordetella parapertussis PCR Not Detected     Chlamydophila pneumoniae PCR Not Detected     Mycoplasma pneumo by PCR Not Detected    Narrative:       In the setting of a positive respiratory panel with a viral infection PLUS a negative procalcitonin without other underlying concern for bacterial infection, consider observing off antibiotics or discontinuation of antibiotics and continue supportive care. If the respiratory panel is positive for atypical bacterial infection (Bordetella pertussis, Chlamydophila pneumoniae, or Mycoplasma pneumoniae), consider antibiotic de-escalation to target atypical bacterial infection.    BAL Culture, Quantitative - Lavage, Lung, Left Upper Lobe [[id number]] Collected: 09/26/24 0811    Specimen: Lavage from Lung, Left Upper Lobe Updated: 09/26/24 1024     Gram Stain Moderate (3+) WBCs seen      Few (2+) Mixed bacterial morphotypes seen on Gram Stain    Fungus Culture - Lavage, Lung, Left Upper Lobe [[id number]] Collected: 09/26/24 0811    Specimen: Lavage from Lung, Left Upper Lobe Updated: 09/26/24 0939    AFB Culture - Lavage, Lung, Left Upper Lobe [[id number]] Collected: 09/26/24 0811    Specimen: Lavage from Lung, Left Upper Lobe Updated: 09/26/24 0939    Pneumocystis PCR - Lavage, Lung, Left Upper Lobe [[id number]] Collected: 09/26/24 0811    Specimen: Lavage from Lung, Left Upper Lobe Updated: 09/26/24 0939    Virus Culture - Lavage, Lung, Left Upper Lobe [[id number]] Collected: 09/26/24 0811    Specimen: Lavage from Lung, Left Upper Lobe Updated: 09/26/24 0939    Basic Metabolic Panel [[id number]]  (Normal) Collected: 09/26/24 0355    Specimen: Blood from Arm, Right Updated: 09/26/24 0457     Glucose 99 mg/dL      BUN 14 mg/dL      Creatinine 0.75 mg/dL      Sodium 141 mmol/L      Potassium 4.1 mmol/L      Chloride 103 mmol/L      CO2 27.8 mmol/L      Calcium 9.3 mg/dL      BUN/Creatinine Ratio 18.7     Anion Gap 10.2 mmol/L      eGFR 89.0 mL/min/1.73     Narrative:      GFR Normal >60  Chronic Kidney Disease <60  Kidney Failure <15      Magnesium [[id number]]  (Normal) Collected: 09/26/24 0355    Specimen: Blood from Arm,  Right Updated: 09/26/24 0457     Magnesium 2.1 mg/dL     CBC & Differential [[id number]]  (Abnormal) Collected: 09/26/24 0355    Specimen: Blood from Arm, Right Updated: 09/26/24 0431    Narrative:      The following orders were created for panel order CBC & Differential.  Procedure                               Abnormality         Status                     ---------                               -----------         ------                     CBC Auto Differential[[id number]]        Abnormal            Final result                 Please view results for these tests on the individual orders.    CBC Auto Differential [[id number]]  (Abnormal) Collected: 09/26/24 0355    Specimen: Blood from Arm, Right Updated: 09/26/24 0431     WBC 10.29 10*3/mm3      RBC 4.11 10*6/mm3      Hemoglobin 11.8 g/dL      Hematocrit 37.7 %      MCV 91.7 fL      MCH 28.7 pg      MCHC 31.3 g/dL      RDW 15.6 %      RDW-SD 52.4 fl      MPV 11.0 fL      Platelets 323 10*3/mm3      Neutrophil % 66.0 %      Lymphocyte % 17.7 %      Monocyte % 11.8 %      Eosinophil % 3.5 %      Basophil % 0.6 %      Immature Grans % 0.4 %      Neutrophils, Absolute 6.80 10*3/mm3      Lymphocytes, Absolute 1.82 10*3/mm3      Monocytes, Absolute 1.21 10*3/mm3      Eosinophils, Absolute 0.36 10*3/mm3      Basophils, Absolute 0.06 10*3/mm3      Immature Grans, Absolute 0.04 10*3/mm3      nRBC 0.0 /100 WBC              Imaging Results (Last 24 Hours)       ** No results found for the last 24 hours. **                 I reviewed the patient's new clinical results.    Medication Review:   Scheduled Meds:budesonide-formoterol, 2 puff, Inhalation, BID - RT  cefTRIAXone, 2,000 mg, Intravenous, Q24H  cetirizine, 10 mg, Oral, Daily  citalopram, 40 mg, Oral, QAM  enoxaparin, 40 mg, Subcutaneous, Q12H  ipratropium-albuterol, 3 mL, Nebulization, 4x Daily - RT  losartan, 100 mg, Oral, Nightly  montelukast, 10 mg, Oral, Nightly  pantoprazole, 40 mg, Oral, Daily  sodium chloride, 10  mL, Intravenous, Q12H  vitamin B-12, 1,000 mcg, Oral, Daily      Continuous Infusions:   PRN Meds:.  acetaminophen    albuterol    aluminum-magnesium hydroxide-simethicone    senna-docusate sodium **AND** polyethylene glycol **AND** bisacodyl **AND** bisacodyl    Calcium Replacement - Follow Nurse / BPA Driven Protocol    ipratropium-albuterol    Magnesium Standard Dose Replacement - Follow Nurse / BPA Driven Protocol    melatonin    nitroglycerin    ondansetron ODT **OR** ondansetron    Phosphorus Replacement - Follow Nurse / BPA Driven Protocol    Potassium Replacement - Follow Nurse / BPA Driven Protocol    [COMPLETED] Insert Peripheral IV **AND** sodium chloride    sodium chloride    sodium chloride     Assessment & Plan       Hemoptysis    Pneumonia  Bronchiectasis-continue ceftriaxone; await culture results of samples obtained during bronchoscopy  Human rhinovirus-symptomatic treatment  Hemoptysis has resolved-recheck hemoglobin in the morning  Interstitial fibrosis-chronic; continue Symbicort  Mood disorder-citalopram  Essential hypertension-losartan    DVT prophylaxis-    Plan for disposition: Home at discharge    Tracey Juarez MD  09/26/24  18:01 EDT

## 2024-09-27 ENCOUNTER — READMISSION MANAGEMENT (OUTPATIENT)
Dept: CALL CENTER | Facility: HOSPITAL | Age: 64
End: 2024-09-27
Payer: MEDICARE

## 2024-09-27 VITALS
BODY MASS INDEX: 44.7 KG/M2 | SYSTOLIC BLOOD PRESSURE: 132 MMHG | OXYGEN SATURATION: 97 % | WEIGHT: 236.77 LBS | RESPIRATION RATE: 18 BRPM | TEMPERATURE: 97.2 F | HEIGHT: 61 IN | DIASTOLIC BLOOD PRESSURE: 67 MMHG | HEART RATE: 102 BPM

## 2024-09-27 PROBLEM — J14 PNEUMONIA OF BOTH LUNGS DUE TO HAEMOPHILUS INFLUENZAE: Status: ACTIVE | Noted: 2024-09-27

## 2024-09-27 PROBLEM — B34.8 RHINOVIRUS INFECTION: Status: ACTIVE | Noted: 2024-09-27

## 2024-09-27 LAB
ANION GAP SERPL CALCULATED.3IONS-SCNC: 9 MMOL/L (ref 5–15)
BACTERIA SPEC RESP CULT: ABNORMAL
BACTERIA SPEC RESP CULT: ABNORMAL
BASOPHILS # BLD AUTO: 0.05 10*3/MM3 (ref 0–0.2)
BASOPHILS NFR BLD AUTO: 0.4 % (ref 0–1.5)
BUN SERPL-MCNC: 14 MG/DL (ref 8–23)
BUN/CREAT SERPL: 18.4 (ref 7–25)
CALCIUM SPEC-SCNC: 9.2 MG/DL (ref 8.6–10.5)
CHLORIDE SERPL-SCNC: 102 MMOL/L (ref 98–107)
CO2 SERPL-SCNC: 28 MMOL/L (ref 22–29)
CREAT SERPL-MCNC: 0.76 MG/DL (ref 0.57–1)
DEPRECATED RDW RBC AUTO: 52.8 FL (ref 37–54)
EGFRCR SERPLBLD CKD-EPI 2021: 87.6 ML/MIN/1.73
EOSINOPHIL # BLD AUTO: 0.15 10*3/MM3 (ref 0–0.4)
EOSINOPHIL NFR BLD AUTO: 1.1 % (ref 0.3–6.2)
ERYTHROCYTE [DISTWIDTH] IN BLOOD BY AUTOMATED COUNT: 15.6 % (ref 12.3–15.4)
GLUCOSE SERPL-MCNC: 104 MG/DL (ref 65–99)
GRAM STN SPEC: ABNORMAL
HCT VFR BLD AUTO: 34.2 % (ref 34–46.6)
HGB BLD-MCNC: 10.8 G/DL (ref 12–15.9)
IMM GRANULOCYTES # BLD AUTO: 0.04 10*3/MM3 (ref 0–0.05)
IMM GRANULOCYTES NFR BLD AUTO: 0.3 % (ref 0–0.5)
LAB AP CASE REPORT: NORMAL
LYMPHOCYTES # BLD AUTO: 1.8 10*3/MM3 (ref 0.7–3.1)
LYMPHOCYTES NFR BLD AUTO: 13.1 % (ref 19.6–45.3)
MAGNESIUM SERPL-MCNC: 2.1 MG/DL (ref 1.6–2.4)
MCH RBC QN AUTO: 29 PG (ref 26.6–33)
MCHC RBC AUTO-ENTMCNC: 31.6 G/DL (ref 31.5–35.7)
MCV RBC AUTO: 91.7 FL (ref 79–97)
MONOCYTES # BLD AUTO: 1.02 10*3/MM3 (ref 0.1–0.9)
MONOCYTES NFR BLD AUTO: 7.4 % (ref 5–12)
MYCOBACTERIUM SPEC CULT: NORMAL
NEUTROPHILS NFR BLD AUTO: 10.66 10*3/MM3 (ref 1.7–7)
NEUTROPHILS NFR BLD AUTO: 77.7 % (ref 42.7–76)
NIGHT BLUE STAIN TISS: NORMAL
NIGHT BLUE STAIN TISS: NORMAL
NRBC BLD AUTO-RTO: 0 /100 WBC (ref 0–0.2)
PATH REPORT.FINAL DX SPEC: NORMAL
PATH REPORT.GROSS SPEC: NORMAL
PLATELET # BLD AUTO: 265 10*3/MM3 (ref 140–450)
PMV BLD AUTO: 11 FL (ref 6–12)
POTASSIUM SERPL-SCNC: 4.1 MMOL/L (ref 3.5–5.2)
RBC # BLD AUTO: 3.73 10*6/MM3 (ref 3.77–5.28)
SODIUM SERPL-SCNC: 139 MMOL/L (ref 136–145)
WBC NRBC COR # BLD AUTO: 13.72 10*3/MM3 (ref 3.4–10.8)

## 2024-09-27 PROCEDURE — 63710000001 PREDNISONE PER 1 MG: Performed by: INTERNAL MEDICINE

## 2024-09-27 PROCEDURE — 85025 COMPLETE CBC W/AUTO DIFF WBC: CPT | Performed by: PHYSICIAN ASSISTANT

## 2024-09-27 PROCEDURE — 94761 N-INVAS EAR/PLS OXIMETRY MLT: CPT

## 2024-09-27 PROCEDURE — 83735 ASSAY OF MAGNESIUM: CPT | Performed by: PHYSICIAN ASSISTANT

## 2024-09-27 PROCEDURE — 80048 BASIC METABOLIC PNL TOTAL CA: CPT | Performed by: PHYSICIAN ASSISTANT

## 2024-09-27 PROCEDURE — 25010000002 ENOXAPARIN PER 10 MG: Performed by: PHYSICIAN ASSISTANT

## 2024-09-27 PROCEDURE — 94799 UNLISTED PULMONARY SVC/PX: CPT

## 2024-09-27 PROCEDURE — 94664 DEMO&/EVAL PT USE INHALER: CPT

## 2024-09-27 RX ORDER — FAMOTIDINE 20 MG/1
20 TABLET, FILM COATED ORAL
Status: DISCONTINUED | OUTPATIENT
Start: 2024-09-27 | End: 2024-09-27 | Stop reason: HOSPADM

## 2024-09-27 RX ORDER — PREDNISONE 20 MG/1
20 TABLET ORAL
Status: DISCONTINUED | OUTPATIENT
Start: 2024-09-27 | End: 2024-09-27 | Stop reason: HOSPADM

## 2024-09-27 RX ORDER — FUROSEMIDE 20 MG
20 TABLET ORAL DAILY
Status: DISCONTINUED | OUTPATIENT
Start: 2024-09-28 | End: 2024-09-27 | Stop reason: HOSPADM

## 2024-09-27 RX ORDER — PREDNISONE 20 MG/1
20 TABLET ORAL
Qty: 13 TABLET | Refills: 0 | Status: SHIPPED | OUTPATIENT
Start: 2024-09-28 | End: 2024-10-11

## 2024-09-27 RX ORDER — SULFAMETHOXAZOLE/TRIMETHOPRIM 800-160 MG
1 TABLET ORAL EVERY 12 HOURS SCHEDULED
Status: DISCONTINUED | OUTPATIENT
Start: 2024-09-27 | End: 2024-09-27 | Stop reason: HOSPADM

## 2024-09-27 RX ORDER — SULFAMETHOXAZOLE/TRIMETHOPRIM 800-160 MG
1 TABLET ORAL EVERY 12 HOURS SCHEDULED
Qty: 27 TABLET | Refills: 0 | Status: SHIPPED | OUTPATIENT
Start: 2024-09-27 | End: 2024-10-11

## 2024-09-27 RX ADMIN — CITALOPRAM HYDROBROMIDE 40 MG: 20 TABLET ORAL at 06:30

## 2024-09-27 RX ADMIN — CYANOCOBALAMIN TAB 500 MCG 1000 MCG: 500 TAB at 08:03

## 2024-09-27 RX ADMIN — IPRATROPIUM BROMIDE AND ALBUTEROL SULFATE 3 ML: .5; 3 SOLUTION RESPIRATORY (INHALATION) at 07:47

## 2024-09-27 RX ADMIN — PANTOPRAZOLE SODIUM 40 MG: 40 TABLET, DELAYED RELEASE ORAL at 08:03

## 2024-09-27 RX ADMIN — ENOXAPARIN SODIUM 40 MG: 100 INJECTION SUBCUTANEOUS at 08:05

## 2024-09-27 RX ADMIN — SULFAMETHOXAZOLE AND TRIMETHOPRIM 1 TABLET: 800; 160 TABLET ORAL at 13:48

## 2024-09-27 RX ADMIN — IPRATROPIUM BROMIDE AND ALBUTEROL SULFATE 3 ML: .5; 3 SOLUTION RESPIRATORY (INHALATION) at 11:55

## 2024-09-27 RX ADMIN — PREDNISONE 20 MG: 20 TABLET ORAL at 13:48

## 2024-09-27 RX ADMIN — BUDESONIDE AND FORMOTEROL FUMARATE DIHYDRATE 2 PUFF: 160; 4.5 AEROSOL RESPIRATORY (INHALATION) at 07:47

## 2024-09-27 RX ADMIN — Medication 10 ML: at 08:04

## 2024-09-27 NOTE — CASE MANAGEMENT/SOCIAL WORK
Discharge Planning Assessment  [facility]     Patient Name: Mariel Parker  MRN: [id number]  Today's Date: 9/27/2024    Admit Date: 9/25/2024    Plan: From home with spouse. O2 2L PRN [place].   Discharge Needs Assessment       Row Name 09/27/24 1058       Living Environment    People in Home spouse    Name(s) of People in Home - Jemal    Current Living Arrangements home    Potentially Unsafe Housing Conditions none    In the past 12 months has the electric, gas, oil, or water company threatened to shut off services in your home? No    Primary Care Provided by self    Provides Primary Care For no one    Family Caregiver if Needed spouse    Family Caregiver Names - Jemal    Quality of Family Relationships helpful;involved;supportive    Able to Return to Prior Arrangements yes       Resource/Environmental Concerns    Resource/Environmental Concerns none    Transportation Concerns none       Transportation Needs    In the past 12 months, has lack of transportation kept you from medical appointments or from getting medications? no    In the past 12 months, has lack of transportation kept you from meetings, work, or from getting things needed for daily living? No       Food Insecurity    Within the past 12 months, you worried that your food would run out before you got the money to buy more. Never true    Within the past 12 months, the food you bought just didn't last and you didn't have money to get more. Never true       Transition Planning    Patient/Family Anticipates Transition to home with family    Patient/Family Anticipated Services at Transition none    Transportation Anticipated family or friend will provide       Discharge Needs Assessment    Readmission Within the Last 30 Days no previous admission in last 30 days    Equipment Currently Used at Home cpap;nebulizer;oxygen;bp cuff;cane, straight;walker, rolling  CPAP & NEB- [facility] Oxygen [place] 2L PRN    Concerns to be Addressed discharge  planning    Anticipated Changes Related to Illness none    Equipment Needed After Discharge none                   Discharge Plan       Row Name 09/27/24 1100       Plan    Plan From home with spouse. O2 2L PRN [place].    Patient/Family in Agreement with Plan yes    Plan Comments Patient lives at home with her , Jemal. Patient does drive and her family will transport at discharge. Patient can do IADL. PCP (Ariel) and pharmacy ([facility]) confirmed. Patient wishes to be enrolled in the [facility] program, CM updated this in the chart. Denies financial assistance needs with medications and/or food. Denies PT and/or HH needs. Discharge barriers: Pulmonary following, IV abx, lung cx pending.                  Continued Care and Services - Admitted Since 9/25/2024    No active coordination exists for this encounter.       Expected Discharge Date and Time       Expected Discharge Date Expected Discharge Time    Sep 29, 2024            Demographic Summary       Row Name 09/27/24 1057       General Information    Admission Type inpatient    Arrived From emergency department    Required Notices Provided Important Message from Medicare    Referral Source admission list    Reason for Consult discharge planning    Preferred Language English       Contact Information    Permission Granted to Share Info With                    Functional Status       Row Name 09/27/24 1058       Functional Status    Usual Activity Tolerance moderate    Current Activity Tolerance moderate       Functional Status, IADL    Medications independent    Meal Preparation independent    Housekeeping independent    Laundry independent    Shopping independent                  Patient Forms       Row Name 09/27/24 1104       Patient Forms    Important Message from Medicare (IMM) Delivered  IMM given by ROMANA Meraz    Delivered to Patient    Method of delivery In person                  Met with patient in room wearing PPE: mask.    Maintained  distance greater than six feet and spent less than 15 minutes in the room.       Yana Asif RN

## 2024-09-27 NOTE — PLAN OF CARE
Problem: Adult Inpatient Plan of Care  Goal: Plan of Care Review  Outcome: Progressing  Flowsheets (Taken 9/27/2024 0607)  Progress: improving  Plan of Care Reviewed With: patient  Goal: Patient-Specific Goal (Individualized)  Outcome: Progressing  Goal: Absence of Hospital-Acquired Illness or Injury  Outcome: Progressing  Intervention: Identify and Manage Fall Risk  Recent Flowsheet Documentation  Taken 9/27/2024 0606 by Nayeli Ro RN  Safety Promotion/Fall Prevention: safety round/check completed  Taken 9/27/2024 0415 by Nayeli Ro RN  Safety Promotion/Fall Prevention: safety round/check completed  Taken 9/27/2024 0225 by Nayeli Ro RN  Safety Promotion/Fall Prevention: safety round/check completed  Taken 9/27/2024 0042 by Nayeli Ro RN  Safety Promotion/Fall Prevention: safety round/check completed  Taken 9/26/2024 2215 by Nayeli Ro RN  Safety Promotion/Fall Prevention: safety round/check completed  Taken 9/26/2024 2045 by Nayeli Ro RN  Safety Promotion/Fall Prevention: safety round/check completed  Intervention: Prevent and Manage VTE (Venous Thromboembolism) Risk  Recent Flowsheet Documentation  Taken 9/27/2024 0042 by Nayeli Ro RN  VTE Prevention/Management: sequential compression devices off  Taken 9/26/2024 2045 by Nayeli Ro RN  VTE Prevention/Management: sequential compression devices off  Intervention: Prevent Infection  Recent Flowsheet Documentation  Taken 9/26/2024 2045 by Nayeli Ro RN  Infection Prevention: hand hygiene promoted  Goal: Optimal Comfort and Wellbeing  Outcome: Progressing  Intervention: Provide Person-Centered Care  Recent Flowsheet Documentation  Taken 9/26/2024 2045 by Nayeli Ro RN  Trust Relationship/Rapport: care explained  Goal: Readiness for Transition of Care  Outcome: Progressing     Problem: Hypertension Comorbidity  Goal: Blood Pressure in Desired Range  Outcome: Progressing     Problem:  Fall Injury Risk  Goal: Absence of Fall and Fall-Related Injury  Outcome: Progressing  Intervention: Promote Injury-Free Environment  Recent Flowsheet Documentation  Taken 9/27/2024 0606 by Nayeli Ro RN  Safety Promotion/Fall Prevention: safety round/check completed  Taken 9/27/2024 0415 by Nayeli Ro RN  Safety Promotion/Fall Prevention: safety round/check completed  Taken 9/27/2024 0225 by Nayeli Ro RN  Safety Promotion/Fall Prevention: safety round/check completed  Taken 9/27/2024 0042 by Nayeli Ro RN  Safety Promotion/Fall Prevention: safety round/check completed  Taken 9/26/2024 2215 by Nayeli Ro RN  Safety Promotion/Fall Prevention: safety round/check completed  Taken 9/26/2024 2045 by Nayeli Ro RN  Safety Promotion/Fall Prevention: safety round/check completed   Goal Outcome Evaluation:  Plan of Care Reviewed With: patient        Progress: improving

## 2024-09-27 NOTE — PLAN OF CARE
Problem: Adult Inpatient Plan of Care  Goal: Plan of Care Review  Outcome: Met  Goal: Patient-Specific Goal (Individualized)  Outcome: Met  Goal: Absence of Hospital-Acquired Illness or Injury  Outcome: Met  Intervention: Identify and Manage Fall Risk  Recent Flowsheet Documentation  Taken 9/27/2024 1230 by Shae Saul RN  Safety Promotion/Fall Prevention:   safety round/check completed   assistive device/personal items within reach   clutter free environment maintained  Taken 9/27/2024 1037 by Shae Saul RN  Safety Promotion/Fall Prevention: safety round/check completed  Taken 9/27/2024 0800 by Shae Saul RN  Safety Promotion/Fall Prevention:   safety round/check completed   assistive device/personal items within reach   clutter free environment maintained  Intervention: Prevent Skin Injury  Recent Flowsheet Documentation  Taken 9/27/2024 0800 by Shae Saul RN  Body Position: position changed independently  Intervention: Prevent and Manage VTE (Venous Thromboembolism) Risk  Recent Flowsheet Documentation  Taken 9/27/2024 0800 by Shae Saul RN  Activity Management: up ad beatriz  VTE Prevention/Management: sequential compression devices off  Intervention: Prevent Infection  Recent Flowsheet Documentation  Taken 9/27/2024 1230 by Shae Saul RN  Infection Prevention:   rest/sleep promoted   single patient room provided  Taken 9/27/2024 1037 by Shae Saul RN  Infection Prevention:   rest/sleep promoted   single patient room provided  Taken 9/27/2024 0800 by Shae Saul RN  Infection Prevention:   rest/sleep promoted   single patient room provided   hand hygiene promoted  Goal: Optimal Comfort and Wellbeing  Outcome: Met  Intervention: Provide Person-Centered Care  Recent Flowsheet Documentation  Taken 9/27/2024 0800 by Shae Saul RN  Trust Relationship/Rapport:   choices provided   care explained   questions answered  Goal: Readiness for Transition of Care  Outcome: Met      Problem: Hypertension Comorbidity  Goal: Blood Pressure in Desired Range  Outcome: Met  Intervention: Maintain Blood Pressure Management  Recent Flowsheet Documentation  Taken 9/27/2024 0800 by Shae Saul RN  Medication Review/Management: medications reviewed     Problem: Fall Injury Risk  Goal: Absence of Fall and Fall-Related Injury  Outcome: Met  Intervention: Identify and Manage Contributors  Recent Flowsheet Documentation  Taken 9/27/2024 0800 by Shae Saul, RN  Medication Review/Management: medications reviewed  Intervention: Promote Injury-Free Environment  Recent Flowsheet Documentation  Taken 9/27/2024 1230 by Shae Saul, RN  Safety Promotion/Fall Prevention:   safety round/check completed   assistive device/personal items within reach   clutter free environment maintained  Taken 9/27/2024 1037 by Shae Saul, RN  Safety Promotion/Fall Prevention: safety round/check completed  Taken 9/27/2024 0800 by Shae Saul, RN  Safety Promotion/Fall Prevention:   safety round/check completed   assistive device/personal items within reach   clutter free environment maintained   Goal Outcome Evaluation:

## 2024-09-27 NOTE — PROGRESS NOTES
Daily Progress Note        Hemoptysis    Pneumonia    Assessment:    Hemoptysis currently resolved  Patient reported mild hemoptysis for 3 hours on 9/25/2024,   S/p bronchoscopy 9/26/2024 done by Dr. Kirkpatrick, positive for rhinovirus  s/p bronch 8/23/24 :by me , Rhinovirus, negative cultures at 4 wk positive for rhinovirus  Hypoxemia     chronic cough   on Rituxan  rheumatoid arthritis, per rheumatology.  offHumira, Plaquenil, off methotrexate off prednisone      Interstitial lung disease no significant change on CT scan since 2020,      PFT 03/28/2024, FEV1 1.5 L/ 68%, ratio 92, TLC 69%, RV 71%, DLCO 81%  PFT June 2019 FEV1 54% TLC 61% DLCO 46%    PFT August 2016  FEV1 85% to 90% TLC 67% RV 15% DLCO 78%   PFT June 2017 showed FEV1 54-55% declined 12 months from 90%  TLC 61% predicted, DLCO 46% was 78% last year     CT chest 06/13/2023 stable lung fibrosis   progressive alveolar interstitial lung disease suggestive NSIP, 1/2023 completed slow 3 month taper of prednisone      ECHO- EF 60%, rvsp 25.8  Residual AHI 1.0, compliance 100% >4 hrs, no leak, no snoring  Hosp f/u COVID 1-2022, completed remdesivir, steroid.  Bronch with no growth.      BETTY, AHI 21.9   history of recurrent bronchitis       History of RA on rituximab           Recommendations:      Oxygen supplement and titration to maintain saturation 90 to 95%  Currently on 10 L    On Rocephin  Start empiric Bactrim for 14 days  P.o. prednisone taper over 14 days    Lasix 20 mg p.o. daily    Bronchodilators  Symbicort (on Breztri at home)  Singulair  Hold Ofev    I personally reviewed the radiological studies          LOS: 1 day     Subjective         Objective     Vital signs for last 24 hours:  Vitals:    09/27/24 0747 09/27/24 0751 09/27/24 0752 09/27/24 0755   BP:       BP Location:       Patient Position:       Pulse: 78 79 80 80   Resp: 16 16 16 16   Temp:       TempSrc:       SpO2: 97% 100% 100% 100%   Weight:       Height:           Intake/Output last 3  shifts:  I/O last 3 completed shifts:  In: 390 [P.O.:240; I.V.:150]  Out: -   Intake/Output this shift:  No intake/output data recorded.      Radiology  Imaging Results (Last 24 Hours)       ** No results found for the last 24 hours. **            Labs:  Results from last 7 days   Lab Units 09/27/24  0333   WBC 10*3/mm3 13.72*   HEMOGLOBIN g/dL 10.8*   HEMATOCRIT % 34.2   PLATELETS 10*3/mm3 265     Results from last 7 days   Lab Units 09/27/24  0333 09/26/24  0355 09/25/24  1223   SODIUM mmol/L 139   < > 140   POTASSIUM mmol/L 4.1   < > 4.3   CHLORIDE mmol/L 102   < > 101   CO2 mmol/L 28.0   < > 28.3   BUN mg/dL 14   < > 14   CREATININE mg/dL 0.76   < > 0.72   CALCIUM mg/dL 9.2   < > 9.6   BILIRUBIN mg/dL  --   --  0.3   ALK PHOS U/L  --   --  90   ALT (SGPT) U/L  --   --  12   AST (SGOT) U/L  --   --  18   GLUCOSE mg/dL 104*   < > 88    < > = values in this interval not displayed.         Results from last 7 days   Lab Units 09/25/24  1223   ALBUMIN g/dL 4.2             Results from last 7 days   Lab Units 09/27/24  0333   MAGNESIUM mg/dL 2.1     Results from last 7 days   Lab Units 09/25/24  1223   INR  0.94   APTT seconds 30.7*               Meds:   SCHEDULE  budesonide-formoterol, 2 puff, Inhalation, BID - RT  cefTRIAXone, 2,000 mg, Intravenous, Q24H  cetirizine, 10 mg, Oral, Daily  citalopram, 40 mg, Oral, QAM  enoxaparin, 40 mg, Subcutaneous, Q12H  famotidine, 20 mg, Oral, BID AC  ipratropium-albuterol, 3 mL, Nebulization, 4x Daily - RT  losartan, 100 mg, Oral, Nightly  montelukast, 10 mg, Oral, Nightly  pantoprazole, 40 mg, Oral, Daily  sodium chloride, 10 mL, Intravenous, Q12H  vitamin B-12, 1,000 mcg, Oral, Daily      Infusions     PRNs    acetaminophen    albuterol    aluminum-magnesium hydroxide-simethicone    senna-docusate sodium **AND** polyethylene glycol **AND** bisacodyl **AND** bisacodyl    Calcium Replacement - Follow Nurse / BPA Driven Protocol    ipratropium-albuterol    Magnesium Standard Dose  Replacement - Follow Nurse / BPA Driven Protocol    melatonin    nitroglycerin    ondansetron ODT **OR** ondansetron    Phosphorus Replacement - Follow Nurse / BPA Driven Protocol    Potassium Replacement - Follow Nurse / BPA Driven Protocol    [COMPLETED] Insert Peripheral IV **AND** sodium chloride    sodium chloride    sodium chloride    Physical Exam:  Physical Exam  Cardiovascular:      Heart sounds: No murmur heard.     No gallop.   Pulmonary:      Effort: No respiratory distress.      Breath sounds: No stridor. Rhonchi and rales present. No wheezing.   Chest:      Chest wall: No tenderness.         ROS  Review of Systems          Total time spent with patient greater than: 45 Minutes

## 2024-09-28 LAB
BACTERIA SPEC AEROBE CULT: ABNORMAL
BACTERIA SPEC AEROBE CULT: ABNORMAL
GRAM STN SPEC: ABNORMAL
GRAM STN SPEC: ABNORMAL

## 2024-09-29 LAB — VIRUS SPEC CULT: NORMAL

## 2024-09-29 NOTE — DISCHARGE SUMMARY
Date of Discharge:  9/29/2024    Discharge Diagnosis:   **Hemoptysis [R04.2]   Rhinovirus infection [B34.8]   Pneumonia of both lungs due to Haemophilus influenzae [J14]   Pneumonia [J18.9]   Rheumatoid arthritis involving multiple sites [M06.9]   BETTY (obstructive sleep apnea) [G47.33]   Cytokine release syndrome, grade 1 [D89.831]   ILD (interstitial lung disease) [J84.9]   Rheumatoid arthritis involving multiple sites [M06.9]       Presenting Problem/History of Present Illness  Active Hospital Problems    Diagnosis  POA    **Hemoptysis [R04.2]  Yes    Rhinovirus infection [B34.8]  Yes    Pneumonia of both lungs due to Haemophilus influenzae [J14]  Yes    Pneumonia [J18.9]  Yes    Rheumatoid arthritis involving multiple sites [M06.9]  Yes    BETTY (obstructive sleep apnea) [G47.33]  Yes    Cytokine release syndrome, grade 1 [D89.831]  Yes    ILD (interstitial lung disease) [J84.9]  Yes    Rheumatoid arthritis involving multiple sites [M06.9]  Yes      Resolved Hospital Problems   No resolved problems to display.          Hospital Course  Patient is a 64 y.o. female with history of chronic lung disease who presented with worsening cough, weakness and shortness of breath.  She is immunocompromised, so underwent bronchoscopy to rule out opportunistic infections.  She tested positive for rhinovirus and sputum culture grew hemophilus.  She will complete a course of oral Bactrim and follow up promptly with PCP and pulmonologist.  She is feeling much better at the time of discharge..      Procedures Performed    Procedure(s):  BRONCHOSCOPY with bronchoalveolar lavage  -------------------       Consults:   Consults       Date and Time Order Name Status Description    9/25/2024 12:13 PM Pulmonology (on-call MD unless specified) Completed             Pertinent Test Results:    Lab Results (most recent)       Procedure Component Value Units Date/Time    Virus Culture - Lavage, Lung, Left Upper Lobe [[id number]] Collected:  09/26/24 0811    Specimen: Lavage from Lung, Left Upper Lobe Updated: 09/29/24 1707     Viral Culture, General Comment     Comment: Preliminary Report:  No virus isolated at 24 hours. Next report to follow after 4 days.       Narrative:      Performed at:  [address] [place] [address]  [id number]  : Mahamed Goins MD, Phone:  [phone]    BAL Culture, Quantitative - Lavage, Lung, Left Upper Lobe [[id number]]  (Abnormal) Collected: 09/26/24 0811    Specimen: Lavage from Lung, Left Upper Lobe Updated: 09/28/24 1059     BAL Culture >100,000 CFU/mL Haemophilus influenzae     Comment: This organism is Beta-lactamase negative and is predictably susceptible to ampicillin or amoxicillin.          >100,000 CFU/mL Normal Respiratory Tr     Gram Stain Moderate (3+) WBCs seen      Few (2+) Mixed bacterial morphotypes seen on Gram Stain    Respiratory Culture - Sputum, Cough [[id number]]  (Abnormal) Collected: 09/25/24 1223    Specimen: Sputum from Cough Updated: 09/27/24 0914     Respiratory Culture Heavy growth (4+) Haemophilus influenzae     Comment: This organism is Beta-lactamase negative and is predictably susceptible to ampicillin or amoxicillin.          Moderate growth (3+) Normal respiratory tr. No S. aureus or Pseudomonas aeruginosa detected. Final report.     Gram Stain Many (4+) WBCs per low power field      Rare (1+) Epithelial cells per low power field      Moderate (3+) Gram negative bacilli, tiny      Few (2+) Gram positive cocci in pairs, chains and clusters    Narrative:            AFB Culture - Lavage, Lung, Left Upper Lobe [[id number]] Collected: 09/26/24 0811    Specimen: Lavage from Lung, Left Upper Lobe Updated: 09/27/24 0903     AFB Stain No acid fast bacilli seen on concentrated smear    Non-gynecologic Cytology [[id number]] Collected: 09/26/24 0811    Specimen: Lavage from Lung, Left Upper Lobe Updated: 09/27/24 0757     Case Report --     Medical Cytology  "Report                           Case: [id number]                                  Authorizing Provider:  Aviva Kirkpatrick MD         Collected:           09/26/2024 08:11 AM          Ordering Location:     [facility]  Received:            09/26/2024 09:43 AM                                 SUITES                                                                       Pathologist:           Agustin Hawkins MD                                                            Specimen:    Lung, Left Upper Lobe                                                                       Final Diagnosis --     Left upper lobe, bronchoalveolar lavage with smears and cytospin:  Obscuring acute inflammation, mature squamous cells, pulmonary macrophages and bronchial epithelial cells  No malignancy identified    ASHLEE       Gross Description --     1. Lung, Left Upper Lobe.  Received in carbowax and designated \"Lung, Left Upper Lobe\" are 40 mL of cloudy green-brown fluid. Particulate matter is present. This specimen is processed as per protocol.          Magnesium [[id number]]  (Normal) Collected: 09/27/24 0333    Specimen: Blood from Arm, Right Updated: 09/27/24 0425     Magnesium 2.1 mg/dL     Basic Metabolic Panel [[id number]]  (Abnormal) Collected: 09/27/24 0333    Specimen: Blood from Arm, Right Updated: 09/27/24 0425     Glucose 104 mg/dL      BUN 14 mg/dL      Creatinine 0.76 mg/dL      Sodium 139 mmol/L      Potassium 4.1 mmol/L      Chloride 102 mmol/L      CO2 28.0 mmol/L      Calcium 9.2 mg/dL      BUN/Creatinine Ratio 18.4     Anion Gap 9.0 mmol/L      eGFR 87.6 mL/min/1.73     Narrative:      GFR Normal >60  Chronic Kidney Disease <60  Kidney Failure <15      CBC & Differential [[id number]]  (Abnormal) Collected: 09/27/24 0333    Specimen: Blood from Arm, Right Updated: 09/27/24 0354    Narrative:      The following orders were created for panel order CBC & Differential.  Procedure                               " Abnormality         Status                     ---------                               -----------         ------                     CBC Auto Differential[[id number]]        Abnormal            Final result                 Please view results for these tests on the individual orders.    CBC Auto Differential [[id number]]  (Abnormal) Collected: 09/27/24 0333    Specimen: Blood from Arm, Right Updated: 09/27/24 0354     WBC 13.72 10*3/mm3      RBC 3.73 10*6/mm3      Hemoglobin 10.8 g/dL      Hematocrit 34.2 %      MCV 91.7 fL      MCH 29.0 pg      MCHC 31.6 g/dL      RDW 15.6 %      RDW-SD 52.8 fl      MPV 11.0 fL      Platelets 265 10*3/mm3      Neutrophil % 77.7 %      Lymphocyte % 13.1 %      Monocyte % 7.4 %      Eosinophil % 1.1 %      Basophil % 0.4 %      Immature Grans % 0.3 %      Neutrophils, Absolute 10.66 10*3/mm3      Lymphocytes, Absolute 1.80 10*3/mm3      Monocytes, Absolute 1.02 10*3/mm3      Eosinophils, Absolute 0.15 10*3/mm3      Basophils, Absolute 0.05 10*3/mm3      Immature Grans, Absolute 0.04 10*3/mm3      nRBC 0.0 /100 WBC     Respiratory Panel PCR w/COVID-19(SARS-CoV-2) [facility]/GRANT/LYN/PAD/COR/MARINA In-House, NP Swab in UTM/VTM, 2 HR TAT - Lavage, Lung, Left Upper Lobe [[id number]]  (Abnormal) Collected: 09/26/24 0811    Specimen: Lavage from Lung, Left Upper Lobe Updated: 09/26/24 1040     ADENOVIRUS, PCR Not Detected     Coronavirus 229E Not Detected     Coronavirus HKU1 Not Detected     Coronavirus NL63 Not Detected     Coronavirus OC43 Not Detected     COVID19 Not Detected     Human Metapneumovirus Not Detected     Human Rhinovirus/Enterovirus Detected     Influenza A PCR Not Detected     Influenza B PCR Not Detected     Parainfluenza Virus 1 Not Detected     Parainfluenza Virus 2 Not Detected     Parainfluenza Virus 3 Not Detected     Parainfluenza Virus 4 Not Detected     RSV, PCR Not Detected     Bordetella pertussis pcr Not Detected     Bordetella parapertussis PCR Not Detected      Chlamydophila pneumoniae PCR Not Detected     Mycoplasma pneumo by PCR Not Detected    Narrative:      In the setting of a positive respiratory panel with a viral infection PLUS a negative procalcitonin without other underlying concern for bacterial infection, consider observing off antibiotics or discontinuation of antibiotics and continue supportive care. If the respiratory panel is positive for atypical bacterial infection (Bordetella pertussis, Chlamydophila pneumoniae, or Mycoplasma pneumoniae), consider antibiotic de-escalation to target atypical bacterial infection.    Fungus Culture - Lavage, Lung, Left Upper Lobe [[id number]] Collected: 09/26/24 0811    Specimen: Lavage from Lung, Left Upper Lobe Updated: 09/26/24 0939    Pneumocystis PCR - Lavage, Lung, Left Upper Lobe [[id number]] Collected: 09/26/24 0811    Specimen: Lavage from Lung, Left Upper Lobe Updated: 09/26/24 0939    Basic Metabolic Panel [[id number]]  (Normal) Collected: 09/26/24 0355    Specimen: Blood from Arm, Right Updated: 09/26/24 0457     Glucose 99 mg/dL      BUN 14 mg/dL      Creatinine 0.75 mg/dL      Sodium 141 mmol/L      Potassium 4.1 mmol/L      Chloride 103 mmol/L      CO2 27.8 mmol/L      Calcium 9.3 mg/dL      BUN/Creatinine Ratio 18.7     Anion Gap 10.2 mmol/L      eGFR 89.0 mL/min/1.73     Narrative:      GFR Normal >60  Chronic Kidney Disease <60  Kidney Failure <15      Magnesium [[id number]]  (Normal) Collected: 09/26/24 0355    Specimen: Blood from Arm, Right Updated: 09/26/24 0457     Magnesium 2.1 mg/dL     CBC & Differential [[id number]]  (Abnormal) Collected: 09/26/24 0355    Specimen: Blood from Arm, Right Updated: 09/26/24 0431    Narrative:      The following orders were created for panel order CBC & Differential.  Procedure                               Abnormality         Status                     ---------                               -----------         ------                     CBC Auto Differential[[id number]]         Abnormal            Final result                 Please view results for these tests on the individual orders.    CBC Auto Differential [[id number]]  (Abnormal) Collected: 09/26/24 0355    Specimen: Blood from Arm, Right Updated: 09/26/24 0431     WBC 10.29 10*3/mm3      RBC 4.11 10*6/mm3      Hemoglobin 11.8 g/dL      Hematocrit 37.7 %      MCV 91.7 fL      MCH 28.7 pg      MCHC 31.3 g/dL      RDW 15.6 %      RDW-SD 52.4 fl      MPV 11.0 fL      Platelets 323 10*3/mm3      Neutrophil % 66.0 %      Lymphocyte % 17.7 %      Monocyte % 11.8 %      Eosinophil % 3.5 %      Basophil % 0.6 %      Immature Grans % 0.4 %      Neutrophils, Absolute 6.80 10*3/mm3      Lymphocytes, Absolute 1.82 10*3/mm3      Monocytes, Absolute 1.21 10*3/mm3      Eosinophils, Absolute 0.36 10*3/mm3      Basophils, Absolute 0.06 10*3/mm3      Immature Grans, Absolute 0.04 10*3/mm3      nRBC 0.0 /100 WBC     BNP [[id number]]  (Normal) Collected: 09/25/24 1632    Specimen: Blood Updated: 09/25/24 1703     proBNP 207.0 pg/mL     Narrative:      This assay is used as an aid in the diagnosis of individuals suspected of having heart failure. It can be used as an aid in the diagnosis of acute decompensated heart failure (ADHF) in patients presenting with signs and symptoms of ADHF to the emergency department (ED). In addition, NT-proBNP of <300 pg/mL indicates ADHF is not likely.    Age Range Result Interpretation  NT-proBNP Concentration (pg/mL:      <50             Positive            >450                   Gray                 300-450                    Negative             <300    50-75           Positive            >900                  Gray                300-900                  Negative            <300      >75             Positive            >1800                  Gray                300-1800                  Negative            <300    Comprehensive Metabolic Panel [[id number]] Collected: 09/25/24 1223    Specimen: Blood Updated: 09/25/24  1257     Glucose 88 mg/dL      BUN 14 mg/dL      Creatinine 0.72 mg/dL      Sodium 140 mmol/L      Potassium 4.3 mmol/L      Chloride 101 mmol/L      CO2 28.3 mmol/L      Calcium 9.6 mg/dL      Total Protein 7.3 g/dL      Albumin 4.2 g/dL      ALT (SGPT) 12 U/L      AST (SGOT) 18 U/L      Alkaline Phosphatase 90 U/L      Total Bilirubin 0.3 mg/dL      Globulin 3.1 gm/dL      A/G Ratio 1.4 g/dL      BUN/Creatinine Ratio 19.4     Anion Gap 10.7 mmol/L      eGFR 93.5 mL/min/1.73     Narrative:      GFR Normal >60  Chronic Kidney Disease <60  Kidney Failure <15      Protime-INR [[id number]]  (Normal) Collected: 09/25/24 1223    Specimen: Blood Updated: 09/25/24 1247     Protime 10.3 Seconds      INR 0.94    aPTT [[id number]]  (Abnormal) Collected: 09/25/24 1223    Specimen: Blood Updated: 09/25/24 1247     PTT 30.7 seconds     Extra Tubes [[id number]] Collected: 09/25/24 1223    Specimen: Blood, Venous Line Updated: 09/25/24 1230    Narrative:      The following orders were created for panel order Extra Tubes.  Procedure                               Abnormality         Status                     ---------                               -----------         ------                     Gold Top - SST[[id number]]                                   Final result                 Please view results for these tests on the individual orders.    Gold Top - SST [[id number]] Collected: 09/25/24 1223    Specimen: Blood Updated: 09/25/24 1230     Extra Tube Hold for add-ons.     Comment: Auto resulted.                Results for orders placed during the hospital encounter of 07/28/22    Adult Transthoracic Echo Complete W/ Cont if Necessary Per Protocol    Interpretation Summary  · Estimated left ventricular EF = 60% Left ventricular systolic function is normal.    Indication  Heart failure    Technically satisfactory study.  Mitral valve is structurally normal.  Tricuspid valve is structurally normal.  Aortic valve is structurally  normal.  Pulmonic valve could not be well visualized.  No evidence for mitral tricuspid or aortic regurgitation is seen by Doppler study.  Left atrium is enlarged.  Right atrium is normal in size.  Left ventricle is normal in size and contractility with ejection fraction of 60%.  Right ventricle is normal in size.  Atrial septum is intact.  Aorta is normal.  No pericardial effusion or intracardiac thrombus is seen.    Impression  Left atrial enlargement.  Normal pulmonary artery pressures (22 mmHg)  Structurally and functionally normal cardiac valves.  Left ventricular size and contractility is normal with ejection fraction of 60%.              Condition on Discharge:  Improved, stable    Vital Signs       Physical Exam:     General Appearance:    Alert, cooperative, in no acute distress, chronically ill appearing, overweight   Head:    Normocephalic, without obvious abnormality, atraumatic   Eyes:            Lids and lashes normal, conjunctivae and sclerae normal, no   icterus, no pallor, corneas clear, PERRLA   Ears:    Ears appear intact with no abnormalities noted   Throat:   No oral lesions, no thrush, oral mucosa moist   Neck:   No adenopathy, supple, trachea midline, no thyromegaly, no   carotid bruit, no JVD   Lungs:     Few scattered rhonchi but overall improved    Heart:    Regular rhythm and normal rate, normal S1 and S2, no            murmur, no gallop, no rub, no click   Chest Wall:    No abnormalities observed   Abdomen:     Normal bowel sounds, no masses, no organomegaly, soft        non-tender, non-distended, no guarding, no rebound                tenderness   Extremities:   Moves all extremities well, no edema, no cyanosis, no             redness   Pulses:   Pulses palpable and equal bilaterally   Skin:   No bleeding, bruising or rash   Lymph nodes:   No palpable adenopathy   Neurologic:   Cranial nerves 2 - 12 grossly intact, sensation intact, DTR       present and equal bilaterally       Discharge  Disposition  Home or Self Care    Discharge Medications     Discharge Medications        New Medications        Instructions Start Date   predniSONE 20 MG tablet  Commonly known as: DELTASONE   20 mg, Oral, Daily With Breakfast      sulfamethoxazole-trimethoprim 800-160 MG per tablet  Commonly known as: BACTRIM DS,SEPTRA DS   1 tablet, Oral, Every 12 Hours Scheduled             Changes to Medications        Instructions Start Date   losartan 100 MG tablet  Commonly known as: Cozaar  What changed: when to take this   100 mg, Oral, Every 24 Hours             Continue These Medications        Instructions Start Date   albuterol sulfate  (90 Base) MCG/ACT inhaler  Commonly known as: ProAir HFA   2 puffs, Inhalation, 4 Times Daily - RT      Calcium Citrate-Vitamin D 200-125 MG-UNIT tablet   1 tablet, Oral, 2 Times Daily      citalopram 40 MG tablet  Commonly known as: CeleXA   40 mg, Oral, Every Morning      Eye Vitamins & Minerals tablet tablet  Generic drug: multivitamin with minerals   2 tablets, Oral, Daily      famotidine 40 MG tablet  Commonly known as: PEPCID   40 mg, Oral, Daily      fluticasone 50 MCG/ACT nasal spray  Commonly known as: FLONASE   1 spray, Nasal, 2 Times Daily      Fluticasone-Salmeterol 500-50 MCG/ACT DISKUS  Commonly known as: ADVAIR/WIXELA   1 puff, Inhalation, 2 Times Daily - RT      ipratropium 0.03 % nasal spray  Commonly known as: ATROVENT   2 sprays, Nasal, Every 12 Hours PRN      levocetirizine 5 MG tablet  Commonly known as: XYZAL   5 mg, Oral, Every Evening      montelukast 10 MG tablet  Commonly known as: SINGULAIR   10 mg, Oral, Nightly      Ofev 100 MG capsule  Generic drug: Nintedanib Esylate   1 capsule, Oral, 2 Times Daily      pantoprazole 40 MG EC tablet  Commonly known as: PROTONIX   40 mg, Oral, Daily      riTUXimab 10 MG/ML solution injection  Commonly known as: RITUXAN   Intravenous, Every 6 Months      vitamin b complex capsule capsule   1 capsule, Oral, Daily       vitamin B-12 1000 MCG tablet  Commonly known as: CYANOCOBALAMIN   1,000 mcg, Oral, Daily               Discharge Diet: Regular    Activity at Discharge: As tolerated    Follow-up Appointments  Future Appointments   Date Time Provider Department Center   12/19/2024  2:15 PM Martell Christianson MD [facility]     Additional Instructions for the Follow-ups that You Need to Schedule       Discharge Follow-up with PCP   As directed       Currently Documented PCP:    Valerio George MD    PCP Phone Number:    [phone]     Follow Up Details: Keep next regularly scheduled appointment.        Discharge Follow-up with Specified Provider: Dr. Garcia - keep next regularly scheduled appointment   As directed      To: Dr. Garcia - keep next regularly scheduled appointment                Test Results Pending at Discharge  Pending Labs       Order Current Status    Fungus Culture - Lavage, Lung, Left Upper Lobe In process    Pneumocystis PCR - Lavage, Lung, Left Upper Lobe In process    AFB Culture - Lavage, Lung, Left Upper Lobe Preliminary result    Virus Culture - Lavage, Lung, Left Upper Lobe Preliminary result             Tracey Juarez MD  09/29/24  18:14 EDT    Time: Discharge 25 min

## 2024-09-30 ENCOUNTER — READMISSION MANAGEMENT (OUTPATIENT)
Dept: CALL CENTER | Facility: HOSPITAL | Age: 64
End: 2024-09-30
Payer: MEDICARE

## 2024-09-30 NOTE — CASE MANAGEMENT/SOCIAL WORK
Case Management Discharge Note      Final Note: Home with O2 per [place]         Selected Continued Care - Discharged on 9/27/2024 Admission date: 9/25/2024 - Discharge disposition: Home or Self Care       Transportation Services  Private: Car    Final Discharge Disposition Code: 01 - home or self-care

## 2024-09-30 NOTE — OUTREACH NOTE
Medical Week 1 Survey      Flowsheet Row Responses   [facility] patient discharged from? [facility]   Does the patient have one of the following disease processes/diagnoses(primary or secondary)? Other   Week 1 attempt successful? Yes   Call start time 1401   Call end time 1404   Discharge diagnosis Hemoptysis   Meds reviewed with patient/caregiver? Yes   Is the patient having any side effects they believe may be caused by any medication additions or changes? No   Does the patient have all medications ordered at discharge? Yes   Is the patient taking all medications as directed (includes completed medication regime)? Yes   Does the patient have a primary care provider?  Yes   Does the patient have an appointment with their PCP within 7 days of discharge? Greater than 7 days   What is preventing the patient from scheduling follow up appointments within 7 days of discharge? Earlier appointment not available   Nursing Interventions Verified appointment date/time/provider   Has the patient kept scheduled appointments due by today? N/A   Has home health visited the patient within 72 hours of discharge? N/A   Psychosocial issues? No   Did the patient receive a copy of their discharge instructions? Yes   Nursing interventions Reviewed instructions with patient   What is the patient's perception of their health status since discharge? Improving   Is the patient/caregiver able to teach back signs and symptoms related to disease process for when to call PCP? Yes   Is the patient/caregiver able to teach back signs and symptoms related to disease process for when to call 911? Yes   Is the patient/caregiver able to teach back the hierarchy of who to call/visit for symptoms/problems? PCP, Specialist, Home health nurse, Urgent Care, ED, 911 Yes   Additional teach back comments denies hemoptysis, does have persistant dry cough, states from steroids, does sip on fluids to decrease cough   Week 1 call completed? Yes   Call end time  1404            Alma BALBUENA - Registered Nurse

## 2024-10-01 LAB
P JIROVECII DNA L RESP QL NAA+NON-PROBE: NEGATIVE
REF LAB TEST METHOD: NORMAL

## 2024-10-03 LAB
FUNGUS WND CULT: NORMAL
MYCOBACTERIUM SPEC CULT: NORMAL
NIGHT BLUE STAIN TISS: NORMAL

## 2024-10-04 LAB
MYCOBACTERIUM SPEC CULT: NORMAL
NIGHT BLUE STAIN TISS: NORMAL
VIRUS SPEC CULT: NORMAL

## 2024-10-09 ENCOUNTER — READMISSION MANAGEMENT (OUTPATIENT)
Dept: CALL CENTER | Facility: HOSPITAL | Age: 64
End: 2024-10-09
Payer: MEDICARE

## 2024-10-09 NOTE — OUTREACH NOTE
Medical Week 2 Survey      Flowsheet Row Responses   [facility] patient discharged from? [facility]   Does the patient have one of the following disease processes/diagnoses(primary or secondary)? Other   Week 2 attempt successful? Yes   Call start time 1251   Discharge diagnosis Hemoptysis   Call end time 1252   Person spoke with today (if not patient) and relationship patient   Does the patient have a primary care provider?  Yes   Comments regarding PCP Patient is currenty at pul follow up   Has home health visited the patient within 72 hours of discharge? N/A   Psychosocial issues? No   Did the patient receive a copy of their discharge instructions? Yes   Nursing interventions Reviewed instructions with patient   What is the patient's perception of their health status since discharge? Improving   Is the patient/caregiver able to teach back signs and symptoms related to disease process for when to call PCP? Yes   Is the patient/caregiver able to teach back signs and symptoms related to disease process for when to call 911? Yes   Is the patient/caregiver able to teach back the hierarchy of who to call/visit for symptoms/problems? PCP, Specialist, Home health nurse, Urgent Care, ED, 911 Yes   Week 2 Call Completed? Yes   Graduated Yes   Wrap up additional comments Patient states she is doing okay no concerns or questions noted.   Call end time 1252            Alisha TIERNEY - Registered Nurse

## 2024-10-10 LAB
FUNGUS WND CULT: NORMAL
MYCOBACTERIUM SPEC CULT: NORMAL
NIGHT BLUE STAIN TISS: NORMAL

## 2024-10-17 ENCOUNTER — TELEPHONE (OUTPATIENT)
Dept: ORTHOPEDIC SURGERY | Facility: CLINIC | Age: 64
End: 2024-10-17
Payer: MEDICARE

## 2024-10-17 LAB
FUNGUS WND CULT: NORMAL
MYCOBACTERIUM SPEC CULT: NORMAL
NIGHT BLUE STAIN TISS: NORMAL

## 2024-10-17 NOTE — TELEPHONE ENCOUNTER
I called patient back and stated that Dr. Christianson typically does not give pain medication out unless your are surgical patient.  I asked patient if she can take NSAIDS she cant due to gastric issues.  She stated that she has tried Tylenol Arthritis but it doesn't really help that much.  I told her I would send a message asking Dr. Christianson but I can't gurantee he will have much to offer since her injection didn't work. I told her that Dr. Christianson is out of the office and would send him a message.  I told her it would be a few days before I got a response but advised she can ask PCP.    Patient understood.

## 2024-10-17 NOTE — TELEPHONE ENCOUNTER
PATIENT CALLED AND STATED INJECTION BACK IN SEPTEMBER DID NOT HELP AND IS ASKING IF PAIN MEDICATION CAN BE CALLED IN UNTIL DESHAWN TO SCHEDULE SURGERY? PLEASE CALL [phone] AND SEND TO [facility] IN [place]

## 2024-10-21 NOTE — TELEPHONE ENCOUNTER
Martell Christianson MD  You1 hour ago (8:48 AM)       I guess we should get her back in and see if she would like to schedule.  I dont really like to prescribe pain meds indefinitely     You  Martell Christianson MD2 hours ago (8:08 AM)     AS  She hasn't been scheduled yet.  She came and got an injection the last time she was here trying to hold off.     Martell Christianson MD  You3 hours ago (7:05 AM)       When is she hoping to get scheduled for surgery?

## 2024-10-24 LAB
FUNGUS WND CULT: NORMAL
MYCOBACTERIUM SPEC CULT: NORMAL
NIGHT BLUE STAIN TISS: NORMAL

## 2024-10-31 LAB
MYCOBACTERIUM SPEC CULT: NORMAL
NIGHT BLUE STAIN TISS: NORMAL

## 2024-11-07 LAB
MYCOBACTERIUM SPEC CULT: NORMAL
NIGHT BLUE STAIN TISS: NORMAL

## 2024-11-11 ENCOUNTER — OFFICE VISIT (OUTPATIENT)
Dept: ORTHOPEDIC SURGERY | Facility: CLINIC | Age: 64
End: 2024-11-11
Payer: MEDICARE

## 2024-11-11 VITALS — HEIGHT: 61 IN | OXYGEN SATURATION: 99 % | HEART RATE: 106 BPM | BODY MASS INDEX: 44.56 KG/M2 | WEIGHT: 236 LBS

## 2024-11-11 DIAGNOSIS — Z96.652 AFTERCARE FOLLOWING LEFT KNEE JOINT REPLACEMENT SURGERY: ICD-10-CM

## 2024-11-11 DIAGNOSIS — Z47.1 AFTERCARE FOLLOWING LEFT KNEE JOINT REPLACEMENT SURGERY: ICD-10-CM

## 2024-11-11 DIAGNOSIS — M17.12 PRIMARY OSTEOARTHRITIS OF LEFT KNEE: Primary | ICD-10-CM

## 2024-11-11 DIAGNOSIS — R79.9 ABNORMAL FINDING OF BLOOD CHEMISTRY, UNSPECIFIED: ICD-10-CM

## 2024-11-11 PROCEDURE — 99214 OFFICE O/P EST MOD 30 MIN: CPT | Performed by: INTERNAL MEDICINE

## 2024-11-11 RX ORDER — CHLORHEXIDINE GLUCONATE 500 MG/1
CLOTH TOPICAL ONCE
OUTPATIENT
Start: 2024-11-11

## 2024-11-11 RX ORDER — SULFAMETHOXAZOLE AND TRIMETHOPRIM 800; 160 MG/1; MG/1
1 TABLET ORAL 2 TIMES DAILY
COMMUNITY

## 2024-11-11 RX ORDER — PREGABALIN 150 MG/1
150 CAPSULE ORAL ONCE
OUTPATIENT
Start: 2024-11-11 | End: 2024-11-11

## 2024-11-11 NOTE — PROGRESS NOTES
Subjective:     Patient ID: Mariel Parker is a 64 y.o. female.    Chief Complaint:    History of Present Illness  Mariel Parker returns to clinic today for evaluation of left knee pain/OA.  Patient states pain is located anterior medially in her knee and is worse with activity and better with rest.  She states the pain is severe and debilitating.  She has exhaust conservative treatments consisting of low-impact exercise, weight loss, use of an assistive device, NSAIDs, and multiple intra-articular injections.  She would like to be considered for left total knee arthroplasty.     Social History     Occupational History    Not on file   Tobacco Use    Smoking status: Never     Passive exposure: Past    Smokeless tobacco: Never   Vaping Use    Vaping status: Never Used   Substance and Sexual Activity    Alcohol use: Not Currently     Comment: wine occasionally    Drug use: No    Sexual activity: Yes     Partners: Male     Birth control/protection: Tubal ligation      Past Medical History:   Diagnosis Date    Ankle sprain     Arthritis of back     Cancer     COPD (chronic obstructive pulmonary disease)     CTS (carpal tunnel syndrome)     GERD (gastroesophageal reflux disease)     Hip arthrosis     Hypertension     Interstitial lung disease     Intraocular pressure increase, bilateral     Knee swelling     Low back strain     Periarthritis of shoulder     Pulmonary fibrosis     Rheumatoid arthritis     Sleep apnea     Tennis elbow      Past Surgical History:   Procedure Laterality Date    ACHILLES TENDON SURGERY  2009    APPENDECTOMY  1999    BRONCHOSCOPY N/A 01/29/2022    Procedure: BRONCHOSCOPY with lung washing;  Surgeon: Dana Garcia MD;  Location: [facility] ENDOSCOPY;  Service: Pulmonary;  Laterality: N/A;  post op: pneumonia    BRONCHOSCOPY N/A 8/23/2024    Procedure: BRONCHOSCOPY WITH BILATERAL LUNG WASHING;  Surgeon: Dana Garcia MD;  Location: [facility] ENDOSCOPY;  Service: Pulmonary;  Laterality: N/A;     "BRONCHOSCOPY N/A 2024    Procedure: BRONCHOSCOPY with bronchoalveolar lavage;  Surgeon: Aviva Kirkpatrick MD;  Location: [facility] ENDOSCOPY;  Service: Pulmonary;  Laterality: N/A;  post op:    CARPAL TUNNEL RELEASE Right     FOOT SURGERY      HAND SURGERY      JOINT REPLACEMENT      SHOULDER SURGERY      TOTAL SHOULDER REVERSE ARTHROPLASTY Right 2018    TRIGGER POINT INJECTION      TUBAL ABDOMINAL LIGATION      WRIST SURGERY         Family History   Problem Relation Age of Onset    Hypertension Mother     Heart disease Mother     COPD Mother     Rheumatologic disease Mother     Hypertension Father     Diabetes Father     Stroke Father     Broken bones Sister     Cancer Sister                  Objective:  Vitals:    24 1443   Pulse: 106   SpO2: 99%   Weight: 107 kg (236 lb)   Height: 154.9 cm (61\")         24  1443   Weight: 107 kg (236 lb)     Body mass index is 44.59 kg/m².  Class 3 Severe Obesity (BMI >=40).       Left Knee Exam     Tenderness   The patient is experiencing tenderness in the medial joint line and medial retinaculum.    Range of Motion   Extension:  0   Flexion:  120     Tests   Seema:  Medial - positive   Varus: negative Valgus: negative  Lachman:  Anterior - negative    Posterior - negative  Drawer:  Anterior - negative     Posterior - negative    Other   Erythema: absent  Scars: absent  Sensation: normal  Pulse: present  Swelling: mild  Effusion: no effusion present                 Imagin views of the left knee were reviewed by myself in the office today  Indication: left knee pain  Findings: X-rays demonstrate no acute osseous abnormality.  There is no signs of fracture dislocation or subluxation.  There is joint space narrowing, subchondral sclerosis, cystic changes, and periarticular osteophytes most pronounced in the Medial joint.  Comparative studies: None      Assessment:        1. Primary osteoarthritis of left knee    2. Aftercare following left knee " joint replacement surgery    3. Abnormal finding of blood chemistry, unspecified           Plan:          She has failed conservative management of left knee osteoarthritis.  We discussed further conservative treatments including but not limited to physical therapy, intra-articular injections, nonsteroidal anti-inflammatories, topical creams, use of an assistive device, weight loss, and low impact exercises.  Shevoiced understanding of further nonoperative treatment options but She is interested in proceeding with knee replacement surgery.    The spectrum of treatment options were discussed with the patient in detail including both the nonoperative and operative treatment modalities and their respective risks and benefits.  After thorough discussion, the patient has elected to undergo surgical treatment.  The details of the surgical procedure were explained including the location of probable incisions and a description of the likely implants to be used.  Models and diagrams were used as educational resources. The patient understands the likely convalescence after surgery, as well as the rehabilitation required.  We thoroughly discussed the risks, benefits, and alternatives to surgery.  The risks include but are not limited to the risk of infection, joint stiffness, blood clots (including DVT and/or pulmonary embolus along with the risk of death), neurologic and/or vascular injury, fracture, dislocation, nonunion, malunion, need for further surgery including hardware failure requiring revision, and continued pain.  It was explained that if tissue has been repaired or reconstructed, there is also a chance of failure which may require further management.  Following the completion of the discussion, the patient expressed understanding of this planned course of care, all their questions were answered and consent will be obtained preoperatively.  I also reviewed the typical postoperative recovery of 6-12 months before  maximal recovery, and possible need for rehabilitation stay after hospitalization. I also explained that in some series of patients in the research literature, up to 20% of patients are dissatisfied with their total knee arthroplasty. I also discussed the risks of of anesthesia. I also explained that she would meet with Anesthesiology preoperatively to discuss anesthetic risk.  All questions were answered.     Plan for left total knee arthroplasty.    Implants: [employer] cemented Legion TKS with [employer]  Anticoagulation: Asprin  Antibiotics: Cefazolin and Vanc  Admission Type: 23 HR Obs  TXA: Yes       Mariel Parker was in agreement with plan and had all questions answered.     Medications:  No orders of the defined types were placed in this encounter.      Followup:  No follow-ups on file.    Diagnoses and all orders for this visit:    1. Primary osteoarthritis of left knee (Primary)  -     Case Request; Standing  -     CBC and Differential; Future  -     Basic metabolic panel; Future  -     Hemoglobin A1c; Future  -     Type and screen; Future  -     Urinalysis With Culture If Indicated -; Future  -     lactated ringers bolus 500 mL  -     Tranexamic Acid 1,000 mg in sodium chloride 0.9 % 100 mL  -     Tranexamic Acid 1,000 mg in sodium chloride 0.9 % 100 mL  -     Chlorhexidine Gluconate Cloth 2 % pads  -     ethyl alcohol 62 % 2 each  -     ceFAZolin (ANCEF) 2 g in sodium chloride 0.9 % 100 mL IVPB  -     pregabalin (LYRICA) capsule 150 mg  -     vancomycin (VANCOCIN) 1,500 mg in sodium chloride 0.9 % 250 mL IVPB  -     Case Request    2. Aftercare following left knee joint replacement surgery  -     CBC and Differential; Future    3. Abnormal finding of blood chemistry, unspecified  -     Hemoglobin A1c; Future    Other orders  -     Follow Anesthesia Guidelines / Protocol; Future  -     Follow Anesthesia Guidelines / Protocol; Standing  -     Nerve Block; Standing  -     Verify NPO Status;  Standing  -     SCD (sequential compression device)- to be placed on patient in Pre-op; Standing  -     Clip operative site; Standing  -     Care Order / Instruction for all Female Patients: Clean Catch Urinalysis with culture if indicated if patient symptomatic: dysuria, flank or lower abdominal pain, burning, urgency or frequency          Dictated utilizing Dragon dictation

## 2024-12-05 ENCOUNTER — TELEPHONE (OUTPATIENT)
Dept: ORTHOPEDIC SURGERY | Facility: CLINIC | Age: 64
End: 2024-12-05
Payer: MEDICARE

## 2024-12-05 NOTE — TELEPHONE ENCOUNTER
Spoke to patient about surgery clearance she needs prior to her surgery. Dr. Christianson wants patient to have Pulmonary Clearance prior to surgery. Patient verbalized understanding.

## 2024-12-05 NOTE — TELEPHONE ENCOUNTER
Caller: Mariel Parker    Relationship: Self    Best call back number: [phone]    What was the call regarding: PT IS SEEING HER PCP TODAY AT 1:40PM. PT STATES THAT DR HURTADO ADVISED PT THAT PT NEEDS CLEARANCE FROM HER PCP FOR SX IN JAN 2025. PT WOULD LIKE TO KNOW IF THERE IS A PARTICULAR FORM THAT HER PCP NEEDS TO FILL OUT, OR IF HER PCP JUST NEEDS TO CONTACT DR HURTADO'S OFFICE TO LET DR HURTADO KNOW PT IS CLEAR FOR SURGERY. PLEASE CONTACT PT TO DISCUSS.      Date of last visit with Cardiologist : 01/04/2024    Pharmacy : [facility] #[id number] -   [address], [facility] [address]  Phone: [phone]  Fax: [phone]   Supply requested : 90 days  Medication requested : empagliflozin (Jardiance)   Dosage : 10 MG  Frequency : once daily  Number of tablets remaining : 10 tablets    Pharmacy : [facility] #[id number] -   [address], [facility] [address]  Phone: [phone]  Fax: [phone]   Supply requested : 90 days  Medication requested : isosorbide mononitrate (IMDUR)   Dosage : 30 MG  Frequency : once daily  Number of tablets remaining : 10 tablets    Pt requests a call back from the clinical team once the refill has been made.    Contact Information  [phone] ([facility])

## 2024-12-12 ENCOUNTER — OFFICE (AMBULATORY)
Dept: URBAN - METROPOLITAN AREA CLINIC 64 | Facility: CLINIC | Age: 64
End: 2024-12-12
Payer: MEDICARE

## 2024-12-12 VITALS
SYSTOLIC BLOOD PRESSURE: 124 MMHG | HEIGHT: 61 IN | WEIGHT: 235 LBS | HEART RATE: 72 BPM | DIASTOLIC BLOOD PRESSURE: 58 MMHG

## 2024-12-12 DIAGNOSIS — K21.00 GASTRO-ESOPHAGEAL REFLUX DISEASE WITH ESOPHAGITIS, WITHOUT B: ICD-10-CM

## 2024-12-12 PROBLEM — R05 CHRONIC COUGH: Status: ACTIVE | Noted: 2018-05-21

## 2024-12-12 PROBLEM — K21.0 GASTRO-ESOPHAGEAL REFLUX DISEASE WITH ESOPHAGITIS: Status: ACTIVE | Noted: 2018-05-21

## 2024-12-12 PROCEDURE — 99213 OFFICE O/P EST LOW 20 MIN: CPT

## 2024-12-12 RX ORDER — PANTOPRAZOLE SODIUM 40 MG/1
80 TABLET, DELAYED RELEASE ORAL
Qty: 60 | Refills: 11 | Status: ACTIVE
Start: 2024-12-12

## 2025-07-22 ENCOUNTER — HOSPITAL ENCOUNTER (OUTPATIENT)
Facility: HOSPITAL | Age: 65
Setting detail: HOSPITAL OUTPATIENT SURGERY
Discharge: HOME | End: 2025-07-22
Attending: INTERNAL MEDICINE | Admitting: INTERNAL MEDICINE
Payer: MEDICARE

## 2025-07-22 ENCOUNTER — ANESTHESIA (OUTPATIENT)
Dept: GASTROENTEROLOGY | Facility: HOSPITAL | Age: 65
End: 2025-07-22
Payer: MEDICARE

## 2025-07-22 ENCOUNTER — ANESTHESIA EVENT (OUTPATIENT)
Dept: GASTROENTEROLOGY | Facility: HOSPITAL | Age: 65
End: 2025-07-22
Payer: MEDICARE

## 2025-07-22 VITALS
HEART RATE: 87 BPM | BODY MASS INDEX: 44.97 KG/M2 | WEIGHT: 238.2 LBS | RESPIRATION RATE: 16 BRPM | OXYGEN SATURATION: 100 % | SYSTOLIC BLOOD PRESSURE: 131 MMHG | DIASTOLIC BLOOD PRESSURE: 61 MMHG | HEIGHT: 61 IN | TEMPERATURE: 98.2 F

## 2025-07-22 DIAGNOSIS — Z87.01: ICD-10-CM

## 2025-07-22 DIAGNOSIS — R05.9 COUGH: ICD-10-CM

## 2025-07-22 LAB
B PARAPERT DNA SPEC QL NAA+PROBE: NOT DETECTED
B PERT DNA SPEC QL NAA+PROBE: NOT DETECTED
BASOPHILS # BLD AUTO: 0.05 10*3/MM3 (ref 0–0.2)
BASOPHILS NFR BLD AUTO: 0.6 % (ref 0–1.5)
C PNEUM DNA NPH QL NAA+NON-PROBE: NOT DETECTED
DEPRECATED RDW RBC AUTO: 48.5 FL (ref 37–54)
EOSINOPHIL # BLD AUTO: 0.26 10*3/MM3 (ref 0–0.4)
EOSINOPHIL NFR BLD AUTO: 3.2 % (ref 0.3–6.2)
ERYTHROCYTE [DISTWIDTH] IN BLOOD BY AUTOMATED COUNT: 14.1 % (ref 12.3–15.4)
FLUAV SUBTYP SPEC NAA+PROBE: NOT DETECTED
FLUBV RNA NPH QL NAA+NON-PROBE: NOT DETECTED
HADV DNA SPEC NAA+PROBE: NOT DETECTED
HCOV 229E RNA SPEC QL NAA+PROBE: NOT DETECTED
HCOV HKU1 RNA SPEC QL NAA+PROBE: NOT DETECTED
HCOV NL63 RNA SPEC QL NAA+PROBE: NOT DETECTED
HCOV OC43 RNA SPEC QL NAA+PROBE: NOT DETECTED
HCT VFR BLD AUTO: 38.7 % (ref 34–46.6)
HGB BLD-MCNC: 11.9 G/DL (ref 12–15.9)
HMPV RNA NPH QL NAA+NON-PROBE: NOT DETECTED
HPIV1 RNA ISLT QL NAA+PROBE: NOT DETECTED
HPIV2 RNA SPEC QL NAA+PROBE: NOT DETECTED
HPIV3 RNA NPH QL NAA+PROBE: NOT DETECTED
HPIV4 P GENE NPH QL NAA+PROBE: NOT DETECTED
IMM GRANULOCYTES # BLD AUTO: 0.03 10*3/MM3 (ref 0–0.05)
IMM GRANULOCYTES NFR BLD AUTO: 0.4 % (ref 0–0.5)
INR PPP: 1.01 (ref 0.9–1.1)
LYMPHOCYTES # BLD AUTO: 2.15 10*3/MM3 (ref 0.7–3.1)
LYMPHOCYTES NFR BLD AUTO: 26.4 % (ref 19.6–45.3)
M PNEUMO IGG SER IA-ACNC: NOT DETECTED
MCH RBC QN AUTO: 28.7 PG (ref 26.6–33)
MCHC RBC AUTO-ENTMCNC: 30.7 G/DL (ref 31.5–35.7)
MCV RBC AUTO: 93.3 FL (ref 79–97)
MONOCYTES # BLD AUTO: 1.06 10*3/MM3 (ref 0.1–0.9)
MONOCYTES NFR BLD AUTO: 13 % (ref 5–12)
NEUTROPHILS NFR BLD AUTO: 4.59 10*3/MM3 (ref 1.7–7)
NEUTROPHILS NFR BLD AUTO: 56.4 % (ref 42.7–76)
NRBC BLD AUTO-RTO: 0 /100 WBC (ref 0–0.2)
PLATELET # BLD AUTO: 316 10*3/MM3 (ref 140–450)
PMV BLD AUTO: 10.5 FL (ref 6–12)
PROTHROMBIN TIME: 13.2 SECONDS (ref 11.7–14.2)
RBC # BLD AUTO: 4.15 10*6/MM3 (ref 3.77–5.28)
RHINOVIRUS RNA SPEC NAA+PROBE: DETECTED
RSV RNA NPH QL NAA+NON-PROBE: NOT DETECTED
SARS-COV-2 RNA RESP QL NAA+PROBE: NOT DETECTED
WBC NRBC COR # BLD AUTO: 8.14 10*3/MM3 (ref 3.4–10.8)

## 2025-07-22 PROCEDURE — 25010000002 LIDOCAINE PF 2% 2 % SOLUTION: Performed by: NURSE ANESTHETIST, CERTIFIED REGISTERED

## 2025-07-22 PROCEDURE — 25010000002 LIDOCAINE 2% SOLUTION: Performed by: INTERNAL MEDICINE

## 2025-07-22 PROCEDURE — 87206 SMEAR FLUORESCENT/ACID STAI: CPT | Performed by: INTERNAL MEDICINE

## 2025-07-22 PROCEDURE — 87116 MYCOBACTERIA CULTURE: CPT | Performed by: INTERNAL MEDICINE

## 2025-07-22 PROCEDURE — 85610 PROTHROMBIN TIME: CPT | Performed by: INTERNAL MEDICINE

## 2025-07-22 PROCEDURE — 87077 CULTURE AEROBIC IDENTIFY: CPT | Performed by: INTERNAL MEDICINE

## 2025-07-22 PROCEDURE — 94799 UNLISTED PULMONARY SVC/PX: CPT

## 2025-07-22 PROCEDURE — 85025 COMPLETE CBC W/AUTO DIFF WBC: CPT | Performed by: INTERNAL MEDICINE

## 2025-07-22 PROCEDURE — 87205 SMEAR GRAM STAIN: CPT | Performed by: INTERNAL MEDICINE

## 2025-07-22 PROCEDURE — 94761 N-INVAS EAR/PLS OXIMETRY MLT: CPT

## 2025-07-22 PROCEDURE — 94640 AIRWAY INHALATION TREATMENT: CPT

## 2025-07-22 PROCEDURE — 87070 CULTURE OTHR SPECIMN AEROBIC: CPT | Performed by: INTERNAL MEDICINE

## 2025-07-22 PROCEDURE — 87102 FUNGUS ISOLATION CULTURE: CPT | Performed by: INTERNAL MEDICINE

## 2025-07-22 PROCEDURE — 25810000003 SODIUM CHLORIDE 0.9 % SOLUTION: Performed by: INTERNAL MEDICINE

## 2025-07-22 PROCEDURE — 25810000003 SODIUM CHLORIDE 0.9 % SOLUTION: Performed by: NURSE ANESTHETIST, CERTIFIED REGISTERED

## 2025-07-22 PROCEDURE — 0202U NFCT DS 22 TRGT SARS-COV-2: CPT | Performed by: INTERNAL MEDICINE

## 2025-07-22 PROCEDURE — 87798 DETECT AGENT NOS DNA AMP: CPT | Performed by: INTERNAL MEDICINE

## 2025-07-22 PROCEDURE — 88108 CYTOPATH CONCENTRATE TECH: CPT | Performed by: INTERNAL MEDICINE

## 2025-07-22 PROCEDURE — 25010000002 PROPOFOL 200 MG/20ML EMULSION: Performed by: NURSE ANESTHETIST, CERTIFIED REGISTERED

## 2025-07-22 RX ORDER — LIDOCAINE HYDROCHLORIDE 20 MG/ML
INJECTION, SOLUTION INFILTRATION; PERINEURAL AS NEEDED
Status: DISCONTINUED | OUTPATIENT
Start: 2025-07-22 | End: 2025-07-22 | Stop reason: HOSPADM

## 2025-07-22 RX ORDER — LABETALOL HYDROCHLORIDE 5 MG/ML
5 INJECTION, SOLUTION INTRAVENOUS
Status: DISCONTINUED | OUTPATIENT
Start: 2025-07-22 | End: 2025-07-22 | Stop reason: HOSPADM

## 2025-07-22 RX ORDER — HYDRALAZINE HYDROCHLORIDE 20 MG/ML
5 INJECTION INTRAMUSCULAR; INTRAVENOUS
Status: DISCONTINUED | OUTPATIENT
Start: 2025-07-22 | End: 2025-07-22 | Stop reason: HOSPADM

## 2025-07-22 RX ORDER — LIDOCAINE HYDROCHLORIDE 20 MG/ML
INJECTION, SOLUTION EPIDURAL; INFILTRATION; INTRACAUDAL; PERINEURAL AS NEEDED
Status: DISCONTINUED | OUTPATIENT
Start: 2025-07-22 | End: 2025-07-22 | Stop reason: SURG

## 2025-07-22 RX ORDER — MEPERIDINE HYDROCHLORIDE 25 MG/ML
12.5 INJECTION INTRAMUSCULAR; INTRAVENOUS; SUBCUTANEOUS
Status: DISCONTINUED | OUTPATIENT
Start: 2025-07-22 | End: 2025-07-22 | Stop reason: HOSPADM

## 2025-07-22 RX ORDER — IPRATROPIUM BROMIDE AND ALBUTEROL SULFATE 2.5; .5 MG/3ML; MG/3ML
3 SOLUTION RESPIRATORY (INHALATION) ONCE AS NEEDED
Status: DISCONTINUED | OUTPATIENT
Start: 2025-07-22 | End: 2025-07-22 | Stop reason: HOSPADM

## 2025-07-22 RX ORDER — SODIUM CHLORIDE 9 MG/ML
10 INJECTION, SOLUTION INTRAVENOUS ONCE
Status: COMPLETED | OUTPATIENT
Start: 2025-07-22 | End: 2025-07-22

## 2025-07-22 RX ORDER — DIPHENHYDRAMINE HYDROCHLORIDE 50 MG/ML
12.5 INJECTION, SOLUTION INTRAMUSCULAR; INTRAVENOUS
Status: DISCONTINUED | OUTPATIENT
Start: 2025-07-22 | End: 2025-07-22 | Stop reason: HOSPADM

## 2025-07-22 RX ORDER — LIDOCAINE 50 MG/G
OINTMENT TOPICAL AS NEEDED
Status: DISCONTINUED | OUTPATIENT
Start: 2025-07-22 | End: 2025-07-22 | Stop reason: HOSPADM

## 2025-07-22 RX ORDER — EPHEDRINE SULFATE 5 MG/ML
5 INJECTION INTRAVENOUS ONCE AS NEEDED
Status: DISCONTINUED | OUTPATIENT
Start: 2025-07-22 | End: 2025-07-22 | Stop reason: HOSPADM

## 2025-07-22 RX ORDER — SODIUM CHLORIDE 9 MG/ML
INJECTION, SOLUTION INTRAVENOUS CONTINUOUS PRN
Status: DISCONTINUED | OUTPATIENT
Start: 2025-07-22 | End: 2025-07-22 | Stop reason: SURG

## 2025-07-22 RX ORDER — IPRATROPIUM BROMIDE AND ALBUTEROL SULFATE 2.5; .5 MG/3ML; MG/3ML
3 SOLUTION RESPIRATORY (INHALATION) ONCE
Status: COMPLETED | OUTPATIENT
Start: 2025-07-22 | End: 2025-07-22

## 2025-07-22 RX ORDER — ONDANSETRON 2 MG/ML
4 INJECTION INTRAMUSCULAR; INTRAVENOUS ONCE AS NEEDED
Status: DISCONTINUED | OUTPATIENT
Start: 2025-07-22 | End: 2025-07-22 | Stop reason: HOSPADM

## 2025-07-22 RX ORDER — PROPOFOL 10 MG/ML
INJECTION, EMULSION INTRAVENOUS AS NEEDED
Status: DISCONTINUED | OUTPATIENT
Start: 2025-07-22 | End: 2025-07-22 | Stop reason: SURG

## 2025-07-22 RX ADMIN — PROPOFOL 80 MG: 10 INJECTION, EMULSION INTRAVENOUS at 07:29

## 2025-07-22 RX ADMIN — LIDOCAINE HYDROCHLORIDE 80 MG: 20 INJECTION, SOLUTION EPIDURAL; INFILTRATION; INTRACAUDAL; PERINEURAL at 07:29

## 2025-07-22 RX ADMIN — PROPOFOL 40 MG: 10 INJECTION, EMULSION INTRAVENOUS at 07:30

## 2025-07-22 RX ADMIN — SODIUM CHLORIDE: 9 INJECTION, SOLUTION INTRAVENOUS at 07:26

## 2025-07-22 RX ADMIN — IPRATROPIUM BROMIDE AND ALBUTEROL SULFATE 3 ML: .5; 3 SOLUTION RESPIRATORY (INHALATION) at 08:00

## 2025-07-22 RX ADMIN — SODIUM CHLORIDE 10 ML/HR: 9 INJECTION, SOLUTION INTRAVENOUS at 06:37

## 2025-07-22 NOTE — OP NOTE
Bronchoscopy Procedure Note    Timeout was done appropriately by staff    Procedure:  Bronchoscopy, Diagnostic therapeutic  Bilateral lung washing    Preoperative Diagnosis: Progressive interstitial lung disease with chronic productive cough on immunosuppression Rituxin    Postoperative Diagnosis:     Thick green mucoid secretions suctioned therapeutically  Bilateral lung washings obtained  Significant inflammatory changes noted  Significant bronchospasm noted    Anesthesia: Moderate Sedation    Procedure Details: Patient was consented for the procedure with all risks and benefits of the procedure explained in detail.  Patient was given the opportunity to ask questions and all concerns were answered.  The bronchocope was inserted into the main airway via the oropharynx. An anatomical survey was done of the main airways and the subsegmental bronchus to at least the first subsegmental level of all five lobes of both lungs.  The findings are reported below.  A bronchoalveolar lavage was performed using aliquots of normal saline instilled into the airways then aspirated back.      Findings:        Thick green mucoid secretions suctioned therapeutically  Bilateral lung washings obtained  Significant inflammatory changes noted  Significant bronchospasm noted    Patient tolerated procedure well        Estimated Blood Loss:  Minimal           Specimens:  Sent serosanguinous fluid                Complications:  None; patient tolerated the procedure well.           Disposition: PACU - hemodynamically stable.      Patient tolerated the procedure well.    Dana Garcia MD  7/22/2025  07:38 EDT

## 2025-07-22 NOTE — ANESTHESIA PREPROCEDURE EVALUATION
Anesthesia Evaluation     NPO Solid Status: > 8 hours  NPO Liquid Status: > 8 hours           Airway   Mallampati: I  TM distance: >3 FB  Neck ROM: full  No difficulty expected  Dental - normal exam     Pulmonary - normal exam   (+) pneumonia , COPD, asthma,shortness of breath, sleep apnea    ROS comment: Hemoptysis for 3 hours on 9/25/2024,   s/p bronch 8/23/24 : Rhinovirus, negative cultures at 4 wk  Hypoxemia     chronic cough      on Rituxan  rheumatoid arthritis, per rheumatology.     Interstitial lung disease no significant change on CT scan since 2020,      PFT 03/28/2024, FEV1 1.5 L/ 68%, ratio 92, TLC 69%, RV 71%, DLCO 81%  PFT June 2019 FEV1 54% TLC 61% DLCO 46%    PFT August 2016  FEV1 85% to 90% TLC 67% RV 15% DLCO 78%   PFT June 2017 showed FEV1 54-55% declined 12 months from 90%  TLC 61% predicted, DLCO 46% was 78% last year     CT chest 06/13/2023 stable lung fibrosis   progressive alveolar interstitial lung disease suggestive NSIP, 1/2023 completed slow 3 month taper of prednisone      ECHO- EF 60%, rvsp 25.8  Residual AHI 1.0, compliance 100% >4 hrs, no leak, no snoring  Hosp f/u COVID 1-2022, completed remdesivir, steroid.  Bronch with no grow  DAIRI  Cardiovascular - normal exam    (+) hypertension, hyperlipidemia      Neuro/Psych  (+) numbness, psychiatric history  GI/Hepatic/Renal/Endo    (+) obesity, morbid obesity, GERD    Musculoskeletal     Abdominal  - normal exam    Bowel sounds: normal.   Substance History      OB/GYN          Other   arthritis,   history of cancer      Other Comment: edical History  Current as of 09/25/24 2032  History Comments History Comments  Interstitial lung disease  Hypertension   GERD (gastroesophageal reflux disease)  Intraocular pressure increase, bilateral   Rheumatoid arthritis  COPD (chronic obstructive pulmonary disease)   Pulmonary fibrosis  Arthritis of back   Hip arthrosis  Knee swelling   Periarthritis of shoulder  Ankle sprain   CTS (carpal tunnel  Phone call to remind about mammogram initiated. syndrome)  Low back strain   Tennis elbow  Sleep apnea   Cancer       Surgical History  Current as of 09/25/24 2032    CARPAL TUNNEL RELEASE APPENDECTOMY  TUBAL ABDOMINAL LIGATION ACHILLES TENDON SURGERY  TOTAL SHOULDER REVERSE ARTHROPLASTY BRONCHOSCOPY  HAND SURGERY SHOULDER SURGERY  JOINT REPLACEMENT TRIGGER POINT INJECTION  WRIST SURGERY FOOT SURGERY  BRONCHOSCOPY       ROS/Med Hx Other: BRONCH 8/23/2024 50MG PROPOFOL  ECHO 2022 PAP 22 EF 60                  Anesthesia Plan    ASA 4     general   total IV anesthesia  intravenous induction     Anesthetic plan, risks, benefits, and alternatives have been provided, discussed and informed consent has been obtained with: patient.  Pre-procedure education provided  Plan discussed with CRNA.      CODE STATUS:

## 2025-07-22 NOTE — ANESTHESIA POSTPROCEDURE EVALUATION
Patient: Mariel Parker    Procedure Summary       Date: 07/22/25 Room / Location: [facility] ENDOSCOPY 3 / [facility]OSCOPY    Anesthesia Start: 0726 Anesthesia Stop: 0740    Procedure: BRONCHOSCOPY WITH BILATERAL LUNG WASHING (Bronchus) Diagnosis:       Cough      History of Haemophilus influenzae pneumonia      (Cough [R05.9])      (History of Haemophilus influenzae pneumonia [Z87.01])    Surgeons: Dana Garcia MD Provider: Miranda Guerrero MD    Anesthesia Type: general ASA Status: 4            Anesthesia Type: general    Vitals  Vitals Value Taken Time   /61 07/22/25 08:05   Temp     Pulse 87 07/22/25 08:09   Resp 16 07/22/25 08:04   SpO2 100 % 07/22/25 08:08   Vitals shown include unfiled device data.        Post Anesthesia Care and Evaluation    Patient location during evaluation: PACU  Patient participation: complete - patient participated  Level of consciousness: awake and alert  Pain management: satisfactory to patient    Airway patency: patent  Anesthetic complications: No anesthetic complications  PONV Status: none  Cardiovascular status: acceptable  Respiratory status: acceptable  Hydration status: acceptable

## 2025-07-22 NOTE — H&P
Patient Care Team:  Valerio George MD as PCP - General (Family Medicine)    Chief complaint chronic cough        Assessment & Plan     63-year-old female non-smoker with progressive interstitial lung disease and chronic cough productive of green sputum   bronchoscopy 09/26/2024 Haemophilus I'm  On Bactrim twice weekly, on Rituxin q4 mo  No response to sample of Breztri, cont w Advair  on  OFEV 100 mg BID since 2022    CT sinus left sphenoid  sinusitis  seen by GI, and ENT     PFT 03/28/2024, FEV1 1.5 L/ 68%, ratio 92, TLC 69%, RV 71%, DLCO 81%  PFT June 2019 FEV1 54% TLC 61% DLCO 46%    PFT August 2016  FEV1 85% to 90% TLC 67% RV 15% DLCO 78%   PFT June 2017 showed FEV1 54-55% declined 12 months from 90%  TLC 61% predicted, DLCO 46% was 78% last year    Chest CT . Scattered areas of interstitial fibrosis in both lungs, greatest in upper lobe distribution, Repeat CT chest 06/13/2023 stable lung fibrosis   progressive alveolar interstitial lung disease suggestive NSIP, 1/2023 completed slow 3 month taper of prednisone   chronic cough s/p covid-19 July 4th, on Rituxan  rheumatoid arthritis, per rheumatology.  ECHO- EF 60%, rvsp 25.8  Residual AHI 1.0, compliance 100% >4 hrs, no leak, no snoring  Hosp f/u COVID 1-2022, completed remdesivir, steroid.  Bronch with no growth.   Cont with brain fog post covid   BETTY, AHI 21.9   history of recurrent bronchitis    CT scan October 2018 stable appearance of mild interstitial lung disease possible RA  bronchoscopy 08/03/2018 bacteria negative AFP negative fungal few penicillium species  bronchoscopy 03/02/2018 showed Pseudomonas sensitive to all antibiotics,    10 days of Cipro p.o.  tobramycin nebulizer 300 mg b.i.d. for 10 days   reactive airway disease and inflammation noted during bronchoscopy   AFB is negative after 43 days,  fungal is negative,  PCPs negative  Interstitial lung disease CT scan of the chest 02/18/2018 showed ground-glass alveolar lung disease with  micronodular rule out infection  Patient had cough-vomit syndrome,  received 10 days of azithromycin  Bronchoscopy 08/16/2017, grew streptococcal pneumonia  AFB and fungal is negative Cytomegalovirus and PCP PCR are negative  History of RA on Humira, Plaquenil, off methotrexate  on prednisone 20 mg daily,  Chest x-ray May 2017 showed increased marking in the right upper lobe   ct chest 8/2016, mildly prominent interstitial markings in the periphery of the upper and lower lobes, and right middle lobe which are chronic. centrilobular and ground-glass   Hypertension most likely related to underlying obstructive sleep apnea and diagnosed untreated    BETTY, residual AHI: 0.8 , No snoring, no Leak, Compliance 100 % > 4 hours    Plan  Diagnostic bronchoscopy    History  As Above      * No active hospital problems. *      Past Medical History:   Diagnosis Date    Ankle sprain     Arthritis of back     Cancer     Basal cell carcinoma on the right side of face    COPD (chronic obstructive pulmonary disease)     CTS (carpal tunnel syndrome)     GERD (gastroesophageal reflux disease)     Hip arthrosis     Hypertension     Interstitial lung disease     Intraocular pressure increase, bilateral     Knee swelling     Low back strain     Periarthritis of shoulder     Pulmonary fibrosis     Rheumatoid arthritis     Sleep apnea     Tennis elbow        Past Surgical History:   Procedure Laterality Date    ACHILLES TENDON SURGERY  2009    APPENDECTOMY  1999    BRONCHOSCOPY N/A 01/29/2022    Procedure: BRONCHOSCOPY with lung washing;  Surgeon: Dana Garcia MD;  Location: [facility] ENDOSCOPY;  Service: Pulmonary;  Laterality: N/A;  post op: pneumonia    BRONCHOSCOPY N/A 8/23/2024    Procedure: BRONCHOSCOPY WITH BILATERAL LUNG WASHING;  Surgeon: Dana Garcia MD;  Location: [facility] ENDOSCOPY;  Service: Pulmonary;  Laterality: N/A;    BRONCHOSCOPY N/A 9/26/2024    Procedure: BRONCHOSCOPY with bronchoalveolar lavage;  Surgeon: Aviva Kirkpatrick MD;   "Location: [facility] ENDOSCOPY;  Service: Pulmonary;  Laterality: N/A;  post op:    CARPAL TUNNEL RELEASE Right 1994    FOOT SURGERY      HAND SURGERY      JOINT REPLACEMENT      SHOULDER SURGERY      TOTAL SHOULDER REVERSE ARTHROPLASTY Right 06/05/2018    TRIGGER POINT INJECTION      TUBAL ABDOMINAL LIGATION  2004    WRIST SURGERY         Family History   Problem Relation Age of Onset    Hypertension Mother     Heart disease Mother     COPD Mother     Rheumatologic disease Mother     Hypertension Father     Diabetes Father     Stroke Father     Broken bones Sister     Cancer Sister        Social History     Socioeconomic History    Marital status:    Tobacco Use    Smoking status: Never     Passive exposure: Past    Smokeless tobacco: Never   Vaping Use    Vaping status: Never Used   Substance and Sexual Activity    Alcohol use: Not Currently     Comment: wine occasionally    Drug use: No    Sexual activity: Yes     Partners: Male     Birth control/protection: Tubal ligation       Review of Systems  Review of Systems   Respiratory:  Positive for cough and shortness of breath. Negative for wheezing and stridor.    Cardiovascular:  Negative for chest pain, palpitations and leg swelling.        Vital Signs  Temp:  [98.2 °F (36.8 °C)] 98.2 °F (36.8 °C)  Heart Rate:  [83] 83  Resp:  [22] 22  BP: (173)/(81) 173/81    Physical Exam:  Physical Exam  Cardiovascular:      Heart sounds: Murmur heard.      No gallop.   Pulmonary:      Effort: No respiratory distress.      Breath sounds: No stridor. Rhonchi and rales present. No wheezing.   Chest:      Chest wall: No tenderness.           Radiology  Imaging Results (Last 24 Hours)       ** No results found for the last 24 hours. **            Labs:  Results from last 7 days   Lab Units 07/22/25  0618   WBC 10*3/mm3 8.14   HEMOGLOBIN g/dL 11.9*   HEMATOCRIT % 38.7   PLATELETS 10*3/mm3 316           Invalid input(s): \"LABALBU\", \"PROT\"                      Results from last 7 " days   Lab Units 07/22/25  0618   INR  1.01               Meds:   SCHEDULE    Infusions  No current facility-administered medications for this encounter.    PRNs        I discussed the patient's findings and my recommendations with patient.     Dana Garcia MD  07/22/25  07:23 EDT    Time: Critical care 45 min

## 2025-07-22 NOTE — DISCHARGE INSTRUCTIONS
Do not drink alcohol, drive, operate any heavy machinery or power tools, or make any important/legal decisions for the next 24 hours.    Call you doctor immediately if you experience severe chest pain, shortness of breath, bleeding or coughing up blood, or fever over 101 F.    Diet: Nothing by mouth until 09:40 am    After a bronchoscopy, you may experience a scratchy throat. This will gradually get better. You may gargle with warm salt water for this after the time noted above is over.     A responsible adult should stay with you and you should rest quietly for the rest of the day. Follow up with MD as instructed.      Findings:    Thick green mucoid secretions suctioned therapeutically  Bilateral lung washings obtained  Significant inflammatory changes noted  Significant bronchospasm noted     Patient tolerated procedure well

## 2025-07-23 LAB
LAB AP CASE REPORT: NORMAL
PATH REPORT.FINAL DX SPEC: NORMAL
PATH REPORT.GROSS SPEC: NORMAL

## 2025-07-24 LAB
BACTERIA SPEC RESP CULT: ABNORMAL
BACTERIA SPEC RESP CULT: ABNORMAL
GRAM STN SPEC: ABNORMAL
GRAM STN SPEC: ABNORMAL

## 2025-07-25 LAB
P JIROVECII DNA L RESP QL NAA+NON-PROBE: NEGATIVE
REF LAB TEST METHOD: NORMAL

## 2025-07-27 LAB
MYCOBACTERIUM SPEC CULT: NORMAL
NIGHT BLUE STAIN TISS: NORMAL

## 2025-07-29 LAB
MYCOBACTERIUM SPEC CULT: NORMAL
NIGHT BLUE STAIN TISS: NORMAL

## 2025-07-31 LAB — FUNGUS WND CULT: ABNORMAL

## 2025-08-05 LAB
MYCOBACTERIUM SPEC CULT: NORMAL
NIGHT BLUE STAIN TISS: NORMAL

## 2025-08-07 LAB — FUNGUS WND CULT: ABNORMAL

## 2025-08-12 LAB
MYCOBACTERIUM SPEC CULT: NORMAL
NIGHT BLUE STAIN TISS: NORMAL

## 2025-08-13 LAB — FUNGUS WND CULT: ABNORMAL

## 2025-08-19 LAB
MYCOBACTERIUM SPEC CULT: NORMAL
NIGHT BLUE STAIN TISS: NORMAL

## 2025-08-21 LAB — FUNGUS WND CULT: ABNORMAL

## 2025-08-26 LAB
MYCOBACTERIUM SPEC CULT: NORMAL
NIGHT BLUE STAIN TISS: NORMAL

## (undated) DEVICE — BAPTIST FLOYD BRONCHOSCOPY: Brand: MEDLINE INDUSTRIES, INC.